# Patient Record
Sex: MALE | Race: WHITE | NOT HISPANIC OR LATINO | Employment: OTHER | URBAN - METROPOLITAN AREA
[De-identification: names, ages, dates, MRNs, and addresses within clinical notes are randomized per-mention and may not be internally consistent; named-entity substitution may affect disease eponyms.]

---

## 2017-03-08 RX ORDER — MELOXICAM 15 MG/1
TABLET ORAL
Qty: 90 TABLET | Refills: 1 | Status: SHIPPED | OUTPATIENT
Start: 2017-03-08 | End: 2017-12-11 | Stop reason: SDUPTHER

## 2017-04-12 ENCOUNTER — OFFICE VISIT (OUTPATIENT)
Dept: FAMILY MEDICINE CLINIC | Facility: CLINIC | Age: 62
End: 2017-04-12

## 2017-04-12 VITALS
OXYGEN SATURATION: 98 % | WEIGHT: 220.7 LBS | HEART RATE: 59 BPM | DIASTOLIC BLOOD PRESSURE: 70 MMHG | HEIGHT: 72 IN | SYSTOLIC BLOOD PRESSURE: 122 MMHG | TEMPERATURE: 97.5 F | BODY MASS INDEX: 29.89 KG/M2

## 2017-04-12 DIAGNOSIS — J06.9 ACUTE URI: Primary | ICD-10-CM

## 2017-04-12 PROCEDURE — 99213 OFFICE O/P EST LOW 20 MIN: CPT | Performed by: FAMILY MEDICINE

## 2017-04-12 RX ORDER — AMOXICILLIN 500 MG/1
500 TABLET, FILM COATED ORAL 3 TIMES DAILY
Qty: 21 TABLET | Refills: 0 | Status: SHIPPED | OUTPATIENT
Start: 2017-04-12 | End: 2017-04-19

## 2017-04-12 NOTE — PROGRESS NOTES
"  Chief Complaint   Patient presents with   • Nasal Congestion     x 3 days   • Cough       Upper Respiratory Infection: Patient complains of symptoms of a URI. Symptoms include congestion, cough and sore throat. Onset of symptoms was 3 days ago, gradually worsening since that time. He also c/o achiness, congestion, low grade fever, nasal congestion, post nasal drip, sinus pressure and sore throat for the past 2 days .  He is drinking plenty of fluids. Evaluation to date: none. Treatment to date: none.  Ill contacts at home or school or work discussed.  Grandson was sick last weekend      Vitals:    04/12/17 1538   BP: 122/70   Pulse: 59   Temp: 97.5 °F (36.4 °C)   SpO2: 98%   Weight: 220 lb 11.2 oz (100 kg)   Height: 72\" (182.9 cm)     Gen: mildly ill appearing, alert  Ears: Tm's bulging without redness  Nose:  Congestion  Throat:  Red without exudate, some drainage, tonsils okay  Neck: no LAD  Lung: mild rales, good air movement, regular RR  Heart: RR without murmur  Skin: no rash.      Assessment/Plan   Alfred was seen today for nasal congestion and cough.    Diagnoses and all orders for this visit:    Acute URI  -     amoxicillin (AMOXIL) 500 MG tablet; Take 1 tablet by mouth 3 (Three) Times a Day for 7 days.             Tylenol or Advil as needed for pain, fever, muscle aches  Plenty of fluids  Hand washing discussed  Off work or school note given if needed.  Warm tea for throat.  Pros and cons of antibiotic use discussed    Dr. Placido Peng MD  Family [place], [place]  [facility]  "

## 2017-06-05 DIAGNOSIS — N52.8 OTHER MALE ERECTILE DYSFUNCTION: ICD-10-CM

## 2017-06-05 RX ORDER — TADALAFIL 20 MG
TABLET ORAL
Qty: 6 TABLET | Refills: 5 | Status: SHIPPED | OUTPATIENT
Start: 2017-06-05 | End: 2018-02-14 | Stop reason: ALTCHOICE

## 2017-09-11 ENCOUNTER — OFFICE VISIT (OUTPATIENT)
Dept: FAMILY MEDICINE CLINIC | Facility: CLINIC | Age: 62
End: 2017-09-11

## 2017-09-11 VITALS
HEART RATE: 74 BPM | OXYGEN SATURATION: 98 % | DIASTOLIC BLOOD PRESSURE: 78 MMHG | HEIGHT: 72 IN | RESPIRATION RATE: 16 BRPM | SYSTOLIC BLOOD PRESSURE: 130 MMHG | BODY MASS INDEX: 29.53 KG/M2 | TEMPERATURE: 98.4 F | WEIGHT: 218 LBS

## 2017-09-11 DIAGNOSIS — J06.9 ACUTE URI: Primary | ICD-10-CM

## 2017-09-11 PROCEDURE — 99213 OFFICE O/P EST LOW 20 MIN: CPT | Performed by: PHYSICIAN ASSISTANT

## 2017-09-11 RX ORDER — BENZONATATE 200 MG/1
200 CAPSULE ORAL 3 TIMES DAILY PRN
Qty: 30 CAPSULE | Refills: 0 | Status: SHIPPED | OUTPATIENT
Start: 2017-09-11 | End: 2018-05-17

## 2017-09-11 RX ORDER — AMOXICILLIN 875 MG/1
875 TABLET, COATED ORAL 2 TIMES DAILY
Qty: 20 TABLET | Refills: 0 | Status: SHIPPED | OUTPATIENT
Start: 2017-09-11 | End: 2017-09-21

## 2017-09-11 NOTE — PROGRESS NOTES
"Subjective   Alfred Vazquez is a 61 y.o. male.   Chief Complaint   Patient presents with   • Cough   • Headache       History of Present Illness     Alfred is a 61 year old male who presents with post nasal drip,achy feeling,dry cough to slight wet sounding cough,headaches,and clear rhinorrhea for the past week.  Alfred was recently at [facility].  Denied any fevers,chills,nausea,vomiting,diarrhea,shortness of air or wheezing.  Appetite and sleep has been normal.  He tirado been taking Xyzal,Robitussin and sudafed without relief.      The following portions of the patient's history were reviewed and updated as appropriate: allergies, current medications, past family history, past medical history, past social history and past surgical history.    Review of Systems   Constitutional: Positive for fatigue. Negative for chills and fever.   HENT: Positive for congestion, postnasal drip and rhinorrhea. Negative for ear pain and sore throat.    Eyes: Negative.    Respiratory: Positive for cough. Negative for shortness of breath and wheezing.    Cardiovascular: Negative.  Negative for chest pain, palpitations and leg swelling.   Gastrointestinal: Negative.  Negative for constipation, diarrhea and nausea.   Endocrine: Negative.    Genitourinary: Negative.    Musculoskeletal: Positive for myalgias.   Skin: Negative.    Allergic/Immunologic: Negative.    Neurological: Positive for headaches. Negative for dizziness and light-headedness.   Hematological: Negative.    Psychiatric/Behavioral: Negative.    All other systems reviewed and are negative.    Vitals:    09/11/17 1444   BP: 130/78   BP Location: Left arm   Patient Position: Sitting   Cuff Size: Large Adult   Pulse: 74   Resp: 16   Temp: 98.4 °F (36.9 °C)   TempSrc: Oral   SpO2: 98%   Weight: 218 lb (98.9 kg)   Height: 72\" (182.9 cm)       Wt Readings from Last 3 Encounters:   09/11/17 218 lb (98.9 kg)   04/12/17 220 lb 11.2 oz (100 kg)   12/12/16 220 lb (99.8 kg) "       BP Readings from Last 3 Encounters:   09/11/17 130/78   04/12/17 122/70   12/12/16 108/62     Body mass index is 29.57 kg/(m^2).  Allergies   Allergen Reactions   • Codeine Nausea Only and Nausea And Vomiting       Objective   Physical Exam   Constitutional: He is oriented to person, place, and time. Vital signs are normal. He appears well-developed and well-nourished.   HENT:   Head: Normocephalic and atraumatic.   Right Ear: Hearing, external ear and ear canal normal. Tympanic membrane is bulging.   Left Ear: Hearing, external ear and ear canal normal. Tympanic membrane is bulging.   Nose: Nose normal. Right sinus exhibits no maxillary sinus tenderness and no frontal sinus tenderness. Left sinus exhibits no maxillary sinus tenderness and no frontal sinus tenderness.   Mouth/Throat: Uvula is midline and mucous membranes are normal.   2+ post nasal drip   Eyes: Conjunctivae, EOM and lids are normal.   Neck: Trachea normal and phonation normal. Neck supple. No edema present.   Cardiovascular: Normal rate, regular rhythm, S1 normal, S2 normal, normal heart sounds and normal pulses.    Pulmonary/Chest: Effort normal and breath sounds normal.   Abdominal: Soft. Normal appearance and bowel sounds are normal. There is no hepatomegaly. There is no tenderness.   Lymphadenopathy:     He has no cervical adenopathy.   Neurological: He is alert and oriented to person, place, and time.   Skin: Skin is warm, dry and intact.   Psychiatric: He has a normal mood and affect. His speech is normal and behavior is normal. Judgment and thought content normal. Cognition and memory are normal.       Assessment/Plan   Alfred was seen today for cough and headache.    Diagnoses and all orders for this visit:    Acute URI  -     benzonatate (TESSALON) 200 MG capsule; Take 1 capsule by mouth 3 (Three) Times a Day As Needed for Cough.  -     amoxicillin (AMOXIL) 875 MG tablet; Take 1 tablet by mouth 2 (Two) Times a Day for 10 days.        Alfred Vazquez was seen in office today and diagnosed with acute upper respiratory infection.  I have prescribed amoxicillin and Tessalon Perles to pharmacy.  Alfred may continue using his son's and Robitussin as needed at home.  Alfred will return to office if no improvement.        Yobani transcription disclaimer    Much of this encounter note is an electronic transcription/translation of spoken language to printed text.  The electronic translation of spoken language may permit erroneous, or at times, nonsensical words or phrases to be inadvertently transcribed.  Although I have reviewed the note for such errors, some may still exist.    Eboni Graves PA-C  Family Practice

## 2017-09-21 ENCOUNTER — CLINICAL SUPPORT (OUTPATIENT)
Dept: FAMILY MEDICINE CLINIC | Facility: CLINIC | Age: 62
End: 2017-09-21

## 2017-09-21 DIAGNOSIS — Z23 IMMUNIZATION DUE: Primary | ICD-10-CM

## 2017-09-21 PROCEDURE — 90471 IMMUNIZATION ADMIN: CPT | Performed by: FAMILY MEDICINE

## 2017-09-21 PROCEDURE — 90686 IIV4 VACC NO PRSV 0.5 ML IM: CPT | Performed by: FAMILY MEDICINE

## 2017-10-24 DIAGNOSIS — Z00.00 HEALTH CARE MAINTENANCE: ICD-10-CM

## 2017-10-24 DIAGNOSIS — Z00.00 ANNUAL PHYSICAL EXAM: Primary | ICD-10-CM

## 2017-10-25 LAB
25(OH)D3+25(OH)D2 SERPL-MCNC: 36.6 NG/ML
ALBUMIN SERPL-MCNC: 4.2 G/DL (ref 3.5–5.2)
ALBUMIN/GLOB SERPL: 1.9 G/DL
ALP SERPL-CCNC: 55 U/L (ref 40–129)
ALT SERPL-CCNC: 21 U/L (ref 5–41)
AST SERPL-CCNC: 23 U/L (ref 5–40)
BASOPHILS # BLD AUTO: 0.04 10*3/MM3 (ref 0–0.2)
BASOPHILS NFR BLD AUTO: 0.9 % (ref 0–2)
BILIRUB SERPL-MCNC: 0.7 MG/DL (ref 0.2–1.2)
BUN SERPL-MCNC: 29 MG/DL (ref 8–23)
BUN/CREAT SERPL: 31.2 (ref 7–25)
CALCIUM SERPL-MCNC: 9.2 MG/DL (ref 8.8–10.5)
CHLORIDE SERPL-SCNC: 105 MMOL/L (ref 98–107)
CHOLEST SERPL-MCNC: 202 MG/DL (ref 0–200)
CO2 SERPL-SCNC: 27.8 MMOL/L (ref 22–29)
CREAT SERPL-MCNC: 0.93 MG/DL (ref 0.76–1.27)
EOSINOPHIL # BLD AUTO: 0.44 10*3/MM3 (ref 0.1–0.3)
EOSINOPHIL NFR BLD AUTO: 10.4 % (ref 0–4)
ERYTHROCYTE [DISTWIDTH] IN BLOOD BY AUTOMATED COUNT: 13.2 % (ref 11.5–14.5)
GFR SERPLBLD CREATININE-BSD FMLA CKD-EPI: 82 ML/MIN/1.73
GFR SERPLBLD CREATININE-BSD FMLA CKD-EPI: 99 ML/MIN/1.73
GLOBULIN SER CALC-MCNC: 2.2 GM/DL
GLUCOSE SERPL-MCNC: 110 MG/DL (ref 65–99)
HCT VFR BLD AUTO: 45.7 % (ref 42–52)
HDLC SERPL-MCNC: 67 MG/DL (ref 40–60)
HGB BLD-MCNC: 15.4 G/DL (ref 14–18)
IMM GRANULOCYTES # BLD: 0.01 10*3/MM3 (ref 0–0.03)
IMM GRANULOCYTES NFR BLD: 0.2 % (ref 0–0.5)
LDLC SERPL CALC-MCNC: 116 MG/DL (ref 0–100)
LYMPHOCYTES # BLD AUTO: 1.35 10*3/MM3 (ref 0.6–4.8)
LYMPHOCYTES NFR BLD AUTO: 31.8 % (ref 20–45)
MCH RBC QN AUTO: 32.5 PG (ref 27–31)
MCHC RBC AUTO-ENTMCNC: 33.7 G/DL (ref 31–37)
MCV RBC AUTO: 96.4 FL (ref 80–94)
MONOCYTES # BLD AUTO: 0.5 10*3/MM3 (ref 0–1)
MONOCYTES NFR BLD AUTO: 11.8 % (ref 3–8)
NEUTROPHILS # BLD AUTO: 1.9 10*3/MM3 (ref 1.5–8.3)
NEUTROPHILS NFR BLD AUTO: 44.9 % (ref 45–70)
NRBC BLD AUTO-RTO: 0 /100 WBC (ref 0–0)
PLATELET # BLD AUTO: 194 10*3/MM3 (ref 140–500)
POTASSIUM SERPL-SCNC: 4.8 MMOL/L (ref 3.5–5.2)
PROT SERPL-MCNC: 6.4 G/DL (ref 6–8.5)
PSA SERPL-MCNC: 0.31 NG/ML (ref 0–4)
RBC # BLD AUTO: 4.74 10*6/MM3 (ref 4.7–6.1)
SODIUM SERPL-SCNC: 144 MMOL/L (ref 136–145)
TRIGL SERPL-MCNC: 94 MG/DL (ref 0–150)
VLDLC SERPL CALC-MCNC: 18.8 MG/DL (ref 8–32)
WBC # BLD AUTO: 4.24 10*3/MM3 (ref 4.8–10.8)

## 2017-11-02 ENCOUNTER — OFFICE VISIT (OUTPATIENT)
Dept: FAMILY MEDICINE CLINIC | Facility: CLINIC | Age: 62
End: 2017-11-02

## 2017-11-02 VITALS
BODY MASS INDEX: 29.66 KG/M2 | TEMPERATURE: 98.3 F | DIASTOLIC BLOOD PRESSURE: 78 MMHG | WEIGHT: 219 LBS | SYSTOLIC BLOOD PRESSURE: 118 MMHG | HEART RATE: 62 BPM | OXYGEN SATURATION: 98 % | HEIGHT: 72 IN

## 2017-11-02 DIAGNOSIS — Z00.00 HEALTHCARE MAINTENANCE: Primary | ICD-10-CM

## 2017-11-02 DIAGNOSIS — Z00.00 ANNUAL PHYSICAL EXAM: ICD-10-CM

## 2017-11-02 DIAGNOSIS — Z23 NEED FOR VACCINATION: ICD-10-CM

## 2017-11-02 DIAGNOSIS — M15.9 PRIMARY OSTEOARTHRITIS INVOLVING MULTIPLE JOINTS: ICD-10-CM

## 2017-11-02 DIAGNOSIS — K21.9 GASTROESOPHAGEAL REFLUX DISEASE WITHOUT ESOPHAGITIS: ICD-10-CM

## 2017-11-02 DIAGNOSIS — E78.2 MIXED HYPERLIPIDEMIA: ICD-10-CM

## 2017-11-02 DIAGNOSIS — I48.91 ATRIAL FIBRILLATION WITH RAPID VENTRICULAR RESPONSE (HCC): ICD-10-CM

## 2017-11-02 DIAGNOSIS — N40.0 BENIGN NODULAR PROSTATIC HYPERPLASIA WITHOUT LOWER URINARY TRACT SYMPTOMS: ICD-10-CM

## 2017-11-02 DIAGNOSIS — R73.02 GLUCOSE INTOLERANCE (IMPAIRED GLUCOSE TOLERANCE): ICD-10-CM

## 2017-11-02 DIAGNOSIS — J30.1 CHRONIC SEASONAL ALLERGIC RHINITIS DUE TO POLLEN: ICD-10-CM

## 2017-11-02 DIAGNOSIS — N52.9 ERECTILE DYSFUNCTION, UNSPECIFIED ERECTILE DYSFUNCTION TYPE: ICD-10-CM

## 2017-11-02 LAB
BILIRUB BLD-MCNC: NEGATIVE MG/DL
CLARITY, POC: CLEAR
COLOR UR: YELLOW
DEVELOPER EXPIRATION DATE: NORMAL
DEVELOPER LOT NUMBER: NORMAL
EXPIRATION DATE: NORMAL
FECAL OCCULT BLOOD SCREEN, POC: NEGATIVE
GLUCOSE UR STRIP-MCNC: NEGATIVE MG/DL
KETONES UR QL: NEGATIVE
LEUKOCYTE EST, POC: NEGATIVE
Lab: NORMAL
NEGATIVE CONTROL: NEGATIVE
NITRITE UR-MCNC: NEGATIVE MG/ML
PH UR: 5 [PH] (ref 5–8)
POSITIVE CONTROL: POSITIVE
PROT UR STRIP-MCNC: NEGATIVE MG/DL
RBC # UR STRIP: NEGATIVE /UL
SP GR UR: 1.02 (ref 1–1.03)
UROBILINOGEN UR QL: NORMAL

## 2017-11-02 PROCEDURE — 99396 PREV VISIT EST AGE 40-64: CPT | Performed by: FAMILY MEDICINE

## 2017-11-02 PROCEDURE — 90471 IMMUNIZATION ADMIN: CPT | Performed by: FAMILY MEDICINE

## 2017-11-02 PROCEDURE — 82274 ASSAY TEST FOR BLOOD FECAL: CPT | Performed by: FAMILY MEDICINE

## 2017-11-02 PROCEDURE — 90732 PPSV23 VACC 2 YRS+ SUBQ/IM: CPT | Performed by: FAMILY MEDICINE

## 2017-11-02 PROCEDURE — 81002 URINALYSIS NONAUTO W/O SCOPE: CPT | Performed by: FAMILY MEDICINE

## 2017-11-02 RX ORDER — FLECAINIDE ACETATE 100 MG/1
100 TABLET ORAL 2 TIMES DAILY
COMMUNITY

## 2017-11-02 RX ORDER — FLECAINIDE ACETATE 100 MG/1
100 TABLET ORAL
COMMUNITY
Start: 2017-09-22 | End: 2017-11-02

## 2017-11-02 NOTE — PROGRESS NOTES
Subjective   Alfred Vazquez is a 62 y.o. male with   Chief Complaint   Patient presents with   • Annual Exam   .    History of Present Illness     Patient presents today for an annual exam.  Patient has unstable HLD.  He tolerates current medications well.  He recently had 3 episodes of AFIB.With his last episode of atrial fibrillation his flecainide was increased in dose.  Patient sees Dr. Kenyon of Cardiology for this.    Patient continues to walk while playing golf instead of using a cart.   Patient states that colonoscopy is up to date. Tetanus vaccine is up to date.     The following portions of the patient's history were reviewed and updated as appropriate: allergies, current medications, past family history, past medical history, past social history, past surgical history and problem list.    Review of Systems   Cardiovascular: Positive for palpitations. Negative for chest pain and leg swelling.        HLD  AFIB   Gastrointestinal:        GERD   Endocrine: Negative for cold intolerance and heat intolerance.        Glucose Intolerance   Genitourinary:        ED   Musculoskeletal: Positive for arthralgias.   Allergic/Immunologic: Positive for environmental allergies.   All other systems reviewed and are negative.      Objective     Vitals:    11/02/17 0838   BP: 118/78   Pulse: 62   Temp: 98.3 °F (36.8 °C)   SpO2: 98%     BP Readings from Last 3 Encounters:   11/02/17 118/78   09/11/17 130/78   04/12/17 122/70      Wt Readings from Last 3 Encounters:   11/02/17 219 lb (99.3 kg)   09/11/17 218 lb (98.9 kg)   04/12/17 220 lb 11.2 oz (100 kg)      Office Visit on 11/02/2017   Component Date Value Ref Range Status   • Color 11/02/2017 Yellow  Yellow, Straw, Dark Yellow, Nellie Final   • Clarity, UA 11/02/2017 Clear  Clear Final   • Glucose, UA 11/02/2017 Negative  Negative, 1000 mg/dL (3+) mg/dL Final   • Bilirubin 11/02/2017 Negative  Negative Final   • Ketones, UA 11/02/2017 Negative  Negative Final   • Specific  Gravity  11/02/2017 1.025  1.005 - 1.030 Final   • Blood, UA 11/02/2017 Negative  Negative Final   • pH, Urine 11/02/2017 5.0  5.0 - 8.0 Final   • Protein, POC 11/02/2017 Negative  Negative mg/dL Final   • Urobilinogen, UA 11/02/2017 Normal  Normal Final   • Leukocytes 11/02/2017 Negative  Negative Final   • Nitrite, UA 11/02/2017 Negative  Negative Final   • Fecal Occult Blood 11/02/2017 Negative  Negative Final   • Lot Number 11/02/2017 [id number]   Final   • Expiration Date 11/02/2017 8/31/18   Final   • DEVELOPER LOT NUMBER 11/02/2017 [id number]   Final   • DEVELOPER EXPIRATION DATE 11/02/2017 7/31/18   Final   • Positive Control 11/02/2017 Positive  Positive Final   • Negative Control 11/02/2017 Negative  Negative Final   Orders Only on 10/24/2017   Component Date Value Ref Range Status   • WBC 10/25/2017 4.24* 4.80 - 10.80 10*3/mm3 Final   • RBC 10/25/2017 4.74  4.70 - 6.10 10*6/mm3 Final   • Hemoglobin 10/25/2017 15.4  14.0 - 18.0 g/dL Final   • Hematocrit 10/25/2017 45.7  42.0 - 52.0 % Final   • MCV 10/25/2017 96.4* 80.0 - 94.0 fL Final   • MCH 10/25/2017 32.5* 27.0 - 31.0 pg Final   • MCHC 10/25/2017 33.7  31.0 - 37.0 g/dL Final   • RDW 10/25/2017 13.2  11.5 - 14.5 % Final   • Platelets 10/25/2017 194  140 - 500 10*3/mm3 Final   • Neutrophil Rel % 10/25/2017 44.9* 45.0 - 70.0 % Final   • Lymphocyte Rel % 10/25/2017 31.8  20.0 - 45.0 % Final   • Monocyte Rel % 10/25/2017 11.8* 3.0 - 8.0 % Final   • Eosinophil Rel % 10/25/2017 10.4* 0.0 - 4.0 % Final   • Basophil Rel % 10/25/2017 0.9  0.0 - 2.0 % Final   • Neutrophils Absolute 10/25/2017 1.90  1.50 - 8.30 10*3/mm3 Final   • Lymphocytes Absolute 10/25/2017 1.35  0.60 - 4.80 10*3/mm3 Final   • Monocytes Absolute 10/25/2017 0.50  0.00 - 1.00 10*3/mm3 Final   • Eosinophils Absolute 10/25/2017 0.44* 0.10 - 0.30 10*3/mm3 Final   • Basophils Absolute 10/25/2017 0.04  0.00 - 0.20 10*3/mm3 Final   • Immature Granulocyte Rel % 10/25/2017 0.2  0.0 - 0.5 % Final   • Immature  Grans Absolute 10/25/2017 0.01  0.00 - 0.03 10*3/mm3 Final   • nRBC 10/25/2017 0.0  0.0 - 0.0 /100 WBC Final   • Glucose 10/25/2017 110* 65 - 99 mg/dL Final   • BUN 10/25/2017 29* 8 - 23 mg/dL Final   • Creatinine 10/25/2017 0.93  0.76 - 1.27 mg/dL Final   • eGFR Non  Am 10/25/2017 82  >60 mL/min/1.73 Final   • eGFR African Am 10/25/2017 99  >60 mL/min/1.73 Final   • BUN/Creatinine Ratio 10/25/2017 31.2* 7.0 - 25.0 Final   • Sodium 10/25/2017 144  136 - 145 mmol/L Final   • Potassium 10/25/2017 4.8  3.5 - 5.2 mmol/L Final   • Chloride 10/25/2017 105  98 - 107 mmol/L Final   • Total CO2 10/25/2017 27.8  22.0 - 29.0 mmol/L Final   • Calcium 10/25/2017 9.2  8.8 - 10.5 mg/dL Final   • Total Protein 10/25/2017 6.4  6.0 - 8.5 g/dL Final   • Albumin 10/25/2017 4.20  3.50 - 5.20 g/dL Final   • Globulin 10/25/2017 2.2  gm/dL Final   • A/G Ratio 10/25/2017 1.9  g/dL Final   • Total Bilirubin 10/25/2017 0.7  0.2 - 1.2 mg/dL Final   • Alkaline Phosphatase 10/25/2017 55  40 - 129 U/L Final   • AST (SGOT) 10/25/2017 23  5 - 40 U/L Final   • ALT (SGPT) 10/25/2017 21  5 - 41 U/L Final   • Total Cholesterol 10/25/2017 202* 0 - 200 mg/dL Final   • Triglycerides 10/25/2017 94  0 - 150 mg/dL Final   • HDL Cholesterol 10/25/2017 67* 40 - 60 mg/dL Final   • VLDL Cholesterol 10/25/2017 18.8  8 - 32 mg/dL Final   • LDL Cholesterol  10/25/2017 116* 0 - 100 mg/dL Final   • PSA 10/25/2017 0.307  0.000 - 4.000 ng/mL Final    Comment: The total PSA (tPSA) method performed at [facility] is a  quantitative electrochemiluminescence immunoassay 'ECLIA'     • 25 Hydroxy, Vitamin D 10/25/2017 36.6  ng/mL Final    Comment: Reference Range for Total Vitamin D 25(OH)  Deficiency    <20.0 ng/mL  Insufficiency 21-29 ng/mL  Sufficiency   30-74 ng/mL         Physical Exam   Constitutional: He is oriented to person, place, and time. Vital signs are normal. He appears well-developed and well-nourished. No distress.   HENT:   Head: Normocephalic and  atraumatic.   Right Ear: Hearing, tympanic membrane, external ear and ear canal normal.   Left Ear: Hearing, tympanic membrane, external ear and ear canal normal.   Nose: Nose normal.   Mouth/Throat: Uvula is midline, oropharynx is clear and moist and mucous membranes are normal.   Eyes: Conjunctivae, EOM and lids are normal. Pupils are equal, round, and reactive to light. No scleral icterus.   Fundoscopic exam:       The right eye shows no AV nicking, no exudate, no hemorrhage and no papilledema.        The left eye shows no AV nicking, no exudate, no hemorrhage and no papilledema.   Neck: Trachea normal and normal range of motion. Neck supple. No JVD present. No muscular tenderness present. Carotid bruit is not present. Normal range of motion present. No thyroid mass and no thyromegaly present.   Cardiovascular: Normal rate, regular rhythm, S1 normal, S2 normal, normal heart sounds, intact distal pulses and normal pulses.  PMI is not displaced.  Exam reveals no gallop and no friction rub.    No murmur heard.  Pulses:       Carotid pulses are 2+ on the right side, and 2+ on the left side.       Radial pulses are 2+ on the right side, and 2+ on the left side.   Pulmonary/Chest: Effort normal and breath sounds normal. He has no decreased breath sounds. He has no wheezes. He has no rhonchi. He has no rales.   Abdominal: Soft. Bowel sounds are normal. He exhibits no distension and no mass. There is no hepatosplenomegaly. There is no tenderness. There is no rigidity, no rebound, no guarding and no CVA tenderness. No hernia. Hernia confirmed negative in the right inguinal area and confirmed negative in the left inguinal area.   Genitourinary: Rectum normal, testes normal and penis normal. Rectal exam shows no external hemorrhoid, no fissure, no mass, no tenderness, anal tone normal and guaiac negative stool. Prostate is enlarged (borderline prostate enlargement.  Lobes are symmetrical with normal consistency.  No  evidence of nodule, mass, or hard region.). Prostate is not tender. Cremasteric reflex is present. Right testis shows no mass, no swelling and no tenderness. Right testis is descended. Left testis shows no mass, no swelling and no tenderness. Left testis is descended. Circumcised. No phimosis, paraphimosis, hypospadias, penile erythema or penile tenderness. No discharge found.   Musculoskeletal: Normal range of motion. He exhibits no edema, tenderness or deformity.   Lymphadenopathy:     He has no cervical adenopathy.   Neurological: He is alert and oriented to person, place, and time. He has normal strength and normal reflexes. He displays no tremor and normal reflexes. No cranial nerve deficit or sensory deficit. He exhibits normal muscle tone. He displays a negative Romberg sign. Coordination and gait normal.   Reflex Scores:       Bicep reflexes are 2+ on the right side and 2+ on the left side.       Brachioradialis reflexes are 2+ on the right side and 2+ on the left side.       Patellar reflexes are 2+ on the right side and 2+ on the left side.  Skin: Skin is warm and dry. No rash noted.   Psychiatric: He has a normal mood and affect. His speech is normal and behavior is normal. Judgment and thought content normal. Cognition and memory are normal.   Nursing note and vitals reviewed.      Assessment/Plan   Alfred was seen today for annual exam.    Diagnoses and all orders for this visit:    Healthcare maintenance  -     POCT urinalysis dipstick, manual  -     POC Occult Blood Stool    Annual physical exam    Mixed hyperlipidemia  -     CBC & Differential; Future  -     Comprehensive Metabolic Panel; Future  -     Lipid Panel; Future    Glucose intolerance (impaired glucose tolerance)  -     CBC & Differential; Future  -     Comprehensive Metabolic Panel; Future  -     Hemoglobin A1c; Future    Need for vaccination  -     Pneumococcal Polysaccharide Vaccine 23-Valent Greater Than or Equal To 1yo Subcutaneous /  IM    Atrial fibrillation with rapid ventricular response    Chronic seasonal allergic rhinitis due to pollen    Gastroesophageal reflux disease without esophagitis    Primary osteoarthritis involving multiple joints    Erectile dysfunction, unspecified erectile dysfunction type    Benign nodular prostatic hyperplasia without lower urinary tract symptoms    Patient has been instructed to maintain a low-cholesterol diet with an LDL goal of 100 or less.  He must also maintain ideal body weight to keep sugar status and control.  Fasting laboratory studies will be repeated in 6 months with follow-up with me thereafter.    Return in about 6 months (around 5/2/2018) for Recheck.    Scribed for Julio Watson MD by Augustine Palmer. 11/02/2017    I, Julio Watson MD personally performed the services described in this documentation, as scribed by Augustine Palmer in my presence, and it is both accurate and complete

## 2017-12-11 RX ORDER — MELOXICAM 15 MG/1
TABLET ORAL
Qty: 90 TABLET | Refills: 0 | Status: SHIPPED | OUTPATIENT
Start: 2017-12-11 | End: 2018-03-17 | Stop reason: SDUPTHER

## 2018-02-14 ENCOUNTER — TELEPHONE (OUTPATIENT)
Dept: FAMILY MEDICINE CLINIC | Facility: CLINIC | Age: 63
End: 2018-02-14

## 2018-02-14 RX ORDER — SILDENAFIL CITRATE 20 MG/1
TABLET ORAL
Qty: 30 TABLET | Refills: 3 | Status: SHIPPED | OUTPATIENT
Start: 2018-02-14 | End: 2018-02-20 | Stop reason: SDUPTHER

## 2018-02-14 NOTE — TELEPHONE ENCOUNTER
Pt is calling to see if you will change is cialis to sildenafil as you had at one time discussed.  Please advise.  
done  
home

## 2018-02-20 RX ORDER — SILDENAFIL CITRATE 20 MG/1
TABLET ORAL
Qty: 90 TABLET | Refills: 1 | Status: SHIPPED | OUTPATIENT
Start: 2018-02-20 | End: 2018-11-08 | Stop reason: SDUPTHER

## 2018-03-19 RX ORDER — MELOXICAM 15 MG/1
TABLET ORAL
Qty: 90 TABLET | Refills: 0 | Status: SHIPPED | OUTPATIENT
Start: 2018-03-19 | End: 2018-06-12 | Stop reason: SDUPTHER

## 2018-05-02 ENCOUNTER — RESULTS ENCOUNTER (OUTPATIENT)
Dept: FAMILY MEDICINE CLINIC | Facility: CLINIC | Age: 63
End: 2018-05-02

## 2018-05-02 DIAGNOSIS — E78.2 MIXED HYPERLIPIDEMIA: ICD-10-CM

## 2018-05-02 DIAGNOSIS — R73.02 GLUCOSE INTOLERANCE (IMPAIRED GLUCOSE TOLERANCE): ICD-10-CM

## 2018-05-03 LAB
ALBUMIN SERPL-MCNC: 4.1 G/DL (ref 3.5–5.2)
ALBUMIN/GLOB SERPL: 1.8 G/DL
ALP SERPL-CCNC: 54 U/L (ref 40–129)
ALT SERPL-CCNC: 22 U/L (ref 5–41)
AST SERPL-CCNC: 25 U/L (ref 5–40)
BASOPHILS # BLD AUTO: 0.02 10*3/MM3 (ref 0–0.2)
BASOPHILS NFR BLD AUTO: 0.4 % (ref 0–2)
BILIRUB SERPL-MCNC: 0.6 MG/DL (ref 0.2–1.2)
BUN SERPL-MCNC: 28 MG/DL (ref 8–23)
BUN/CREAT SERPL: 31.1 (ref 7–25)
CALCIUM SERPL-MCNC: 8.9 MG/DL (ref 8.8–10.5)
CHLORIDE SERPL-SCNC: 104 MMOL/L (ref 98–107)
CHOLEST SERPL-MCNC: 170 MG/DL (ref 0–200)
CO2 SERPL-SCNC: 27.9 MMOL/L (ref 22–29)
CREAT SERPL-MCNC: 0.9 MG/DL (ref 0.76–1.27)
EOSINOPHIL # BLD AUTO: 0.11 10*3/MM3 (ref 0.1–0.3)
EOSINOPHIL NFR BLD AUTO: 2 % (ref 0–4)
ERYTHROCYTE [DISTWIDTH] IN BLOOD BY AUTOMATED COUNT: 12.4 % (ref 11.5–14.5)
GFR SERPLBLD CREATININE-BSD FMLA CKD-EPI: 103 ML/MIN/1.73
GFR SERPLBLD CREATININE-BSD FMLA CKD-EPI: 85 ML/MIN/1.73
GLOBULIN SER CALC-MCNC: 2.3 GM/DL
GLUCOSE SERPL-MCNC: 106 MG/DL (ref 65–99)
HBA1C MFR BLD: 5.7 % (ref 4.8–5.6)
HCT VFR BLD AUTO: 44.8 % (ref 42–52)
HDLC SERPL-MCNC: 66 MG/DL (ref 40–60)
HGB BLD-MCNC: 15 G/DL (ref 14–18)
IMM GRANULOCYTES # BLD: 0.02 10*3/MM3 (ref 0–0.03)
IMM GRANULOCYTES NFR BLD: 0.4 % (ref 0–0.5)
LDLC SERPL CALC-MCNC: 88 MG/DL (ref 0–100)
LYMPHOCYTES # BLD AUTO: 1.24 10*3/MM3 (ref 0.6–4.8)
LYMPHOCYTES NFR BLD AUTO: 22.8 % (ref 20–45)
MCH RBC QN AUTO: 32.6 PG (ref 27–31)
MCHC RBC AUTO-ENTMCNC: 33.5 G/DL (ref 31–37)
MCV RBC AUTO: 97.4 FL (ref 80–94)
MONOCYTES # BLD AUTO: 0.68 10*3/MM3 (ref 0–1)
MONOCYTES NFR BLD AUTO: 12.5 % (ref 3–8)
NEUTROPHILS # BLD AUTO: 3.37 10*3/MM3 (ref 1.5–8.3)
NEUTROPHILS NFR BLD AUTO: 61.9 % (ref 45–70)
NRBC BLD AUTO-RTO: 0 /100 WBC (ref 0–0)
PLATELET # BLD AUTO: 196 10*3/MM3 (ref 140–500)
POTASSIUM SERPL-SCNC: 4.5 MMOL/L (ref 3.5–5.2)
PROT SERPL-MCNC: 6.4 G/DL (ref 6–8.5)
RBC # BLD AUTO: 4.6 10*6/MM3 (ref 4.7–6.1)
SODIUM SERPL-SCNC: 141 MMOL/L (ref 136–145)
TRIGL SERPL-MCNC: 81 MG/DL (ref 0–150)
VLDLC SERPL CALC-MCNC: 16.2 MG/DL (ref 8–32)
WBC # BLD AUTO: 5.44 10*3/MM3 (ref 4.8–10.8)

## 2018-05-17 ENCOUNTER — OFFICE VISIT (OUTPATIENT)
Dept: FAMILY MEDICINE CLINIC | Facility: CLINIC | Age: 63
End: 2018-05-17

## 2018-05-17 VITALS
SYSTOLIC BLOOD PRESSURE: 100 MMHG | WEIGHT: 217 LBS | BODY MASS INDEX: 29.39 KG/M2 | TEMPERATURE: 98 F | HEIGHT: 72 IN | OXYGEN SATURATION: 97 % | DIASTOLIC BLOOD PRESSURE: 74 MMHG | RESPIRATION RATE: 16 BRPM | HEART RATE: 59 BPM

## 2018-05-17 DIAGNOSIS — M77.8 TENDINITIS OF RIGHT TRICEPS: ICD-10-CM

## 2018-05-17 DIAGNOSIS — N40.0 BENIGN NODULAR PROSTATIC HYPERPLASIA WITHOUT LOWER URINARY TRACT SYMPTOMS: ICD-10-CM

## 2018-05-17 DIAGNOSIS — R73.02 GLUCOSE INTOLERANCE (IMPAIRED GLUCOSE TOLERANCE): ICD-10-CM

## 2018-05-17 DIAGNOSIS — E55.9 VITAMIN D DEFICIENCY: ICD-10-CM

## 2018-05-17 DIAGNOSIS — E78.2 MIXED HYPERLIPIDEMIA: Primary | ICD-10-CM

## 2018-05-17 PROCEDURE — 99214 OFFICE O/P EST MOD 30 MIN: CPT | Performed by: FAMILY MEDICINE

## 2018-05-17 RX ORDER — METOPROLOL SUCCINATE 25 MG/1
25 TABLET, EXTENDED RELEASE ORAL DAILY
Qty: 90 TABLET | Refills: 1 | Status: SHIPPED | OUTPATIENT
Start: 2018-05-17 | End: 2018-11-08 | Stop reason: SDUPTHER

## 2018-05-17 RX ORDER — FAMOTIDINE 10 MG
20 TABLET ORAL DAILY
COMMUNITY

## 2018-05-17 NOTE — PATIENT INSTRUCTIONS
Results for orders placed or performed in visit on 05/02/18   Comprehensive Metabolic Panel   Result Value Ref Range    Glucose 106 (H) 65 - 99 mg/dL    BUN 28 (H) 8 - 23 mg/dL    Creatinine 0.90 0.76 - 1.27 mg/dL    eGFR Non African Am 85 >60 mL/min/1.73    eGFR African Am 103 >60 mL/min/1.73    BUN/Creatinine Ratio 31.1 (H) 7.0 - 25.0    Sodium 141 136 - 145 mmol/L    Potassium 4.5 3.5 - 5.2 mmol/L    Chloride 104 98 - 107 mmol/L    Total CO2 27.9 22.0 - 29.0 mmol/L    Calcium 8.9 8.8 - 10.5 mg/dL    Total Protein 6.4 6.0 - 8.5 g/dL    Albumin 4.10 3.50 - 5.20 g/dL    Globulin 2.3 gm/dL    A/G Ratio 1.8 g/dL    Total Bilirubin 0.6 0.2 - 1.2 mg/dL    Alkaline Phosphatase 54 40 - 129 U/L    AST (SGOT) 25 5 - 40 U/L    ALT (SGPT) 22 5 - 41 U/L   Hemoglobin A1c   Result Value Ref Range    Hemoglobin A1C 5.70 (H) 4.80 - 5.60 %   Lipid Panel   Result Value Ref Range    Total Cholesterol 170 0 - 200 mg/dL    Triglycerides 81 0 - 150 mg/dL    HDL Cholesterol 66 (H) 40 - 60 mg/dL    VLDL Cholesterol 16.2 8 - 32 mg/dL    LDL Cholesterol  88 0 - 100 mg/dL   CBC & Differential   Result Value Ref Range    WBC 5.44 4.80 - 10.80 10*3/mm3    RBC 4.60 (L) 4.70 - 6.10 10*6/mm3    Hemoglobin 15.0 14.0 - 18.0 g/dL    Hematocrit 44.8 42.0 - 52.0 %    MCV 97.4 (H) 80.0 - 94.0 fL    MCH 32.6 (H) 27.0 - 31.0 pg    MCHC 33.5 31.0 - 37.0 g/dL    RDW 12.4 11.5 - 14.5 %    Platelets 196 140 - 500 10*3/mm3    Neutrophil Rel % 61.9 45.0 - 70.0 %    Lymphocyte Rel % 22.8 20.0 - 45.0 %    Monocyte Rel % 12.5 (H) 3.0 - 8.0 %    Eosinophil Rel % 2.0 0.0 - 4.0 %    Basophil Rel % 0.4 0.0 - 2.0 %    Neutrophils Absolute 3.37 1.50 - 8.30 10*3/mm3    Lymphocytes Absolute 1.24 0.60 - 4.80 10*3/mm3    Monocytes Absolute 0.68 0.00 - 1.00 10*3/mm3    Eosinophils Absolute 0.11 0.10 - 0.30 10*3/mm3    Basophils Absolute 0.02 0.00 - 0.20 10*3/mm3    Immature Granulocyte Rel % 0.4 0.0 - 0.5 %    Immature Grans Absolute 0.02 0.00 - 0.03 10*3/mm3    nRBC 0.0  0.0 - 0.0 /100 WBC   Have discussed mechanism of tricep function with patient to avoid overuse of same.  He will ice this area after golf and other activities where tricep comes into play.  Continued weight loss will benefit including sugar status.  Anticipate follow-up in 6 months preceded by fasting laboratory studies unless otherwise indicated.

## 2018-05-17 NOTE — PROGRESS NOTES
Subjective   Alfred Vazquez is a 62 y.o. male with   Chief Complaint   Patient presents with   • Follow-up     on labs   .    History of Present Illness     Pt is a 61 yo white male that presents today to follow up on unstable HLD and impaired glucose tolerance.  Pt with stable exogenous obesity and stable blood pressure.  Pt states he is trying to eat healthy and lose weight which is helping to control his cholesterol..  Pt is currently stable on Metoprolol 25mg daily for his blood pressure and he tolerates the medication well.  Pt with known history of A.Fib which he is currently stable taking Flecainide.  Dr Kenyon is his cardiologist and he follows the medication use.  Pt does have a problem with some swelling/pain of the right elbow.  He states that it doesn't cause him much pain just discomfort, he can even play golf with no pain.Patient denies knowledge of trauma, injury or initiating event.    The following portions of the patient's history were reviewed and updated as appropriate: allergies, current medications, past family history, past medical history, past social history, past surgical history and problem list.    Review of Systems   Cardiovascular: Negative for chest pain, palpitations and leg swelling.        HLD   Endocrine: Negative for cold intolerance and heat intolerance.        Impaired fasting glucose   Musculoskeletal: Positive for joint swelling.        Right elbow swelling and pain   All other systems reviewed and are negative.      Objective     Vitals:    05/17/18 1055   BP: 100/74   Pulse: 59   Resp: 16   Temp: 98 °F (36.7 °C)   SpO2: 97%     BP Readings from Last 3 Encounters:   05/17/18 100/74   11/02/17 118/78   09/11/17 130/78      Wt Readings from Last 3 Encounters:   05/17/18 98.4 kg (217 lb)   11/02/17 99.3 kg (219 lb)   09/11/17 98.9 kg (218 lb)      No visits with results within 2 Week(s) from this visit.   Latest known visit with results is:   Results Encounter on 05/02/2018    Component Date Value Ref Range Status   • WBC 05/03/2018 5.44  4.80 - 10.80 10*3/mm3 Final   • RBC 05/03/2018 4.60* 4.70 - 6.10 10*6/mm3 Final   • Hemoglobin 05/03/2018 15.0  14.0 - 18.0 g/dL Final   • Hematocrit 05/03/2018 44.8  42.0 - 52.0 % Final   • MCV 05/03/2018 97.4* 80.0 - 94.0 fL Final   • MCH 05/03/2018 32.6* 27.0 - 31.0 pg Final   • MCHC 05/03/2018 33.5  31.0 - 37.0 g/dL Final   • RDW 05/03/2018 12.4  11.5 - 14.5 % Final   • Platelets 05/03/2018 196  140 - 500 10*3/mm3 Final   • Neutrophil Rel % 05/03/2018 61.9  45.0 - 70.0 % Final   • Lymphocyte Rel % 05/03/2018 22.8  20.0 - 45.0 % Final   • Monocyte Rel % 05/03/2018 12.5* 3.0 - 8.0 % Final   • Eosinophil Rel % 05/03/2018 2.0  0.0 - 4.0 % Final   • Basophil Rel % 05/03/2018 0.4  0.0 - 2.0 % Final   • Neutrophils Absolute 05/03/2018 3.37  1.50 - 8.30 10*3/mm3 Final   • Lymphocytes Absolute 05/03/2018 1.24  0.60 - 4.80 10*3/mm3 Final   • Monocytes Absolute 05/03/2018 0.68  0.00 - 1.00 10*3/mm3 Final   • Eosinophils Absolute 05/03/2018 0.11  0.10 - 0.30 10*3/mm3 Final   • Basophils Absolute 05/03/2018 0.02  0.00 - 0.20 10*3/mm3 Final   • Immature Granulocyte Rel % 05/03/2018 0.4  0.0 - 0.5 % Final   • Immature Grans Absolute 05/03/2018 0.02  0.00 - 0.03 10*3/mm3 Final   • nRBC 05/03/2018 0.0  0.0 - 0.0 /100 WBC Final   • Glucose 05/03/2018 106* 65 - 99 mg/dL Final   • BUN 05/03/2018 28* 8 - 23 mg/dL Final   • Creatinine 05/03/2018 0.90  0.76 - 1.27 mg/dL Final   • eGFR Non  Am 05/03/2018 85  >60 mL/min/1.73 Final   • eGFR African Am 05/03/2018 103  >60 mL/min/1.73 Final   • BUN/Creatinine Ratio 05/03/2018 31.1* 7.0 - 25.0 Final   • Sodium 05/03/2018 141  136 - 145 mmol/L Final   • Potassium 05/03/2018 4.5  3.5 - 5.2 mmol/L Final   • Chloride 05/03/2018 104  98 - 107 mmol/L Final   • Total CO2 05/03/2018 27.9  22.0 - 29.0 mmol/L Final   • Calcium 05/03/2018 8.9  8.8 - 10.5 mg/dL Final   • Total Protein 05/03/2018 6.4  6.0 - 8.5 g/dL Final   •  Albumin 05/03/2018 4.10  3.50 - 5.20 g/dL Final   • Globulin 05/03/2018 2.3  gm/dL Final   • A/G Ratio 05/03/2018 1.8  g/dL Final   • Total Bilirubin 05/03/2018 0.6  0.2 - 1.2 mg/dL Final   • Alkaline Phosphatase 05/03/2018 54  40 - 129 U/L Final   • AST (SGOT) 05/03/2018 25  5 - 40 U/L Final   • ALT (SGPT) 05/03/2018 22  5 - 41 U/L Final   • Hemoglobin A1C 05/03/2018 5.70* 4.80 - 5.60 % Final   • Total Cholesterol 05/03/2018 170  0 - 200 mg/dL Final   • Triglycerides 05/03/2018 81  0 - 150 mg/dL Final   • HDL Cholesterol 05/03/2018 66* 40 - 60 mg/dL Final   • VLDL Cholesterol 05/03/2018 16.2  8 - 32 mg/dL Final   • LDL Cholesterol  05/03/2018 88  0 - 100 mg/dL Final       Physical Exam   Constitutional: He is oriented to person, place, and time. He appears well-developed and well-nourished.   HENT:   Head: Normocephalic and atraumatic.   Neck: Trachea normal and phonation normal. Neck supple. Normal carotid pulses present. Carotid bruit is not present. No thyroid mass and no thyromegaly present.   Cardiovascular: Normal rate, regular rhythm and normal heart sounds.  Exam reveals no gallop and no friction rub.    No murmur heard.  Pulmonary/Chest: Effort normal and breath sounds normal. No respiratory distress. He has no decreased breath sounds. He has no wheezes. He has no rhonchi. He has no rales.   Musculoskeletal: Normal range of motion. He exhibits no deformity. Tenderness: right elbow.        Right elbow: He exhibits swelling. He exhibits normal range of motion and no deformity. Tenderness (right elbow) found.   Right elbow with full range of motion, no evidence of soft tissue swelling, and it was no evidence of deformity.  Motor is 5 over 5 in neurovascular is intact distally.  Point of maximum tenderness is in the insertion site medially of the triceps tendon.  His epicondyles of the humerus are both nontender to palpation.  He is able to do curls as well as extension exercises on a resisted basis without  discomfort.   Lymphadenopathy:     He has no cervical adenopathy.   Neurological: He is alert and oriented to person, place, and time. He has normal strength. No sensory deficit.   Skin: Skin is warm and dry. No rash noted.   Psychiatric: He has a normal mood and affect. His speech is normal and behavior is normal. Judgment and thought content normal. Cognition and memory are normal.   Nursing note and vitals reviewed.      Assessment/Plan   Alfred was seen today for follow-up.    Diagnoses and all orders for this visit:    Mixed hyperlipidemia  -     CBC & Differential; Future  -     Comprehensive Metabolic Panel; Future  -     Lipid Panel; Future    Glucose intolerance (impaired glucose tolerance)  -     Hemoglobin A1c; Future    Benign nodular prostatic hyperplasia without lower urinary tract symptoms  -     PSA DIAGNOSTIC; Future    Vitamin D deficiency  -     Vitamin D 25 Hydroxy; Future    Tendinitis of right triceps    Other orders  -     metoprolol succinate XL (TOPROL-XL) 25 MG 24 hr tablet; Take 1 tablet by mouth Daily.      Patient Instructions     Results for orders placed or performed in visit on 05/02/18   Comprehensive Metabolic Panel   Result Value Ref Range    Glucose 106 (H) 65 - 99 mg/dL    BUN 28 (H) 8 - 23 mg/dL    Creatinine 0.90 0.76 - 1.27 mg/dL    eGFR Non African Am 85 >60 mL/min/1.73    eGFR African Am 103 >60 mL/min/1.73    BUN/Creatinine Ratio 31.1 (H) 7.0 - 25.0    Sodium 141 136 - 145 mmol/L    Potassium 4.5 3.5 - 5.2 mmol/L    Chloride 104 98 - 107 mmol/L    Total CO2 27.9 22.0 - 29.0 mmol/L    Calcium 8.9 8.8 - 10.5 mg/dL    Total Protein 6.4 6.0 - 8.5 g/dL    Albumin 4.10 3.50 - 5.20 g/dL    Globulin 2.3 gm/dL    A/G Ratio 1.8 g/dL    Total Bilirubin 0.6 0.2 - 1.2 mg/dL    Alkaline Phosphatase 54 40 - 129 U/L    AST (SGOT) 25 5 - 40 U/L    ALT (SGPT) 22 5 - 41 U/L   Hemoglobin A1c   Result Value Ref Range    Hemoglobin A1C 5.70 (H) 4.80 - 5.60 %   Lipid Panel   Result Value Ref Range     Total Cholesterol 170 0 - 200 mg/dL    Triglycerides 81 0 - 150 mg/dL    HDL Cholesterol 66 (H) 40 - 60 mg/dL    VLDL Cholesterol 16.2 8 - 32 mg/dL    LDL Cholesterol  88 0 - 100 mg/dL   CBC & Differential   Result Value Ref Range    WBC 5.44 4.80 - 10.80 10*3/mm3    RBC 4.60 (L) 4.70 - 6.10 10*6/mm3    Hemoglobin 15.0 14.0 - 18.0 g/dL    Hematocrit 44.8 42.0 - 52.0 %    MCV 97.4 (H) 80.0 - 94.0 fL    MCH 32.6 (H) 27.0 - 31.0 pg    MCHC 33.5 31.0 - 37.0 g/dL    RDW 12.4 11.5 - 14.5 %    Platelets 196 140 - 500 10*3/mm3    Neutrophil Rel % 61.9 45.0 - 70.0 %    Lymphocyte Rel % 22.8 20.0 - 45.0 %    Monocyte Rel % 12.5 (H) 3.0 - 8.0 %    Eosinophil Rel % 2.0 0.0 - 4.0 %    Basophil Rel % 0.4 0.0 - 2.0 %    Neutrophils Absolute 3.37 1.50 - 8.30 10*3/mm3    Lymphocytes Absolute 1.24 0.60 - 4.80 10*3/mm3    Monocytes Absolute 0.68 0.00 - 1.00 10*3/mm3    Eosinophils Absolute 0.11 0.10 - 0.30 10*3/mm3    Basophils Absolute 0.02 0.00 - 0.20 10*3/mm3    Immature Granulocyte Rel % 0.4 0.0 - 0.5 %    Immature Grans Absolute 0.02 0.00 - 0.03 10*3/mm3    nRBC 0.0 0.0 - 0.0 /100 WBC   Have discussed mechanism of tricep function with patient to avoid overuse of same.  He will ice this area after golf and other activities where tricep comes into play.  Continued weight loss will benefit including sugar status.  Anticipate follow-up in 6 months preceded by fasting laboratory studies unless otherwise indicated.        Return in about 6 months (around 11/17/2018).    Scribed for Julio Watson MD by Ana Canales CMA. 05/17/2018    Julio WADE MD personally performed the services described in this documentation, as scribed by Ana Canales CMA in my presence, and it is both accurate and complete

## 2018-06-12 RX ORDER — MELOXICAM 15 MG/1
TABLET ORAL
Qty: 90 TABLET | Refills: 0 | Status: SHIPPED | OUTPATIENT
Start: 2018-06-12 | End: 2018-09-14 | Stop reason: SDUPTHER

## 2018-08-20 ENCOUNTER — TELEPHONE (OUTPATIENT)
Dept: FAMILY MEDICINE CLINIC | Facility: CLINIC | Age: 63
End: 2018-08-20

## 2018-08-20 NOTE — TELEPHONE ENCOUNTER
Alfred was wanting an appt with you this Monday for heal pain. But, he then went on to say do you think he should go to a podiatrist first? I can make him a appt on Monday if you think that this is best and I will let him know.

## 2018-08-21 DIAGNOSIS — M79.671 HEEL PAIN, BILATERAL: Primary | ICD-10-CM

## 2018-08-21 DIAGNOSIS — M79.672 HEEL PAIN, BILATERAL: Primary | ICD-10-CM

## 2018-08-29 RX ORDER — SILDENAFIL CITRATE 20 MG/1
TABLET ORAL
Qty: 30 TABLET | Refills: 2 | Status: SHIPPED | OUTPATIENT
Start: 2018-08-29 | End: 2019-08-08 | Stop reason: SDUPTHER

## 2018-09-14 RX ORDER — MELOXICAM 15 MG/1
TABLET ORAL
Qty: 90 TABLET | Refills: 0 | Status: SHIPPED | OUTPATIENT
Start: 2018-09-14 | End: 2018-12-28 | Stop reason: SDUPTHER

## 2018-09-18 DIAGNOSIS — R73.02 GLUCOSE INTOLERANCE (IMPAIRED GLUCOSE TOLERANCE): ICD-10-CM

## 2018-09-18 DIAGNOSIS — E55.9 VITAMIN D DEFICIENCY: ICD-10-CM

## 2018-09-18 DIAGNOSIS — E78.2 MIXED HYPERLIPIDEMIA: ICD-10-CM

## 2018-09-18 DIAGNOSIS — N40.0 BENIGN NODULAR PROSTATIC HYPERPLASIA WITHOUT LOWER URINARY TRACT SYMPTOMS: ICD-10-CM

## 2018-10-08 ENCOUNTER — FLU SHOT (OUTPATIENT)
Dept: FAMILY MEDICINE CLINIC | Facility: CLINIC | Age: 63
End: 2018-10-08

## 2018-10-08 DIAGNOSIS — Z23 FLU VACCINE NEED: ICD-10-CM

## 2018-10-08 PROCEDURE — 90674 CCIIV4 VAC NO PRSV 0.5 ML IM: CPT | Performed by: FAMILY MEDICINE

## 2018-10-08 PROCEDURE — G0008 ADMIN INFLUENZA VIRUS VAC: HCPCS | Performed by: FAMILY MEDICINE

## 2018-11-01 LAB
ALBUMIN SERPL-MCNC: 4.4 G/DL (ref 3.5–5.2)
ALBUMIN/GLOB SERPL: 2 G/DL
ALP SERPL-CCNC: 51 U/L (ref 40–129)
ALT SERPL-CCNC: 18 U/L (ref 5–41)
AST SERPL-CCNC: 20 U/L (ref 5–40)
BASOPHILS # BLD AUTO: 0.03 10*3/MM3 (ref 0–0.2)
BASOPHILS NFR BLD AUTO: 0.8 % (ref 0–2)
BILIRUB SERPL-MCNC: 0.6 MG/DL (ref 0.2–1.2)
BUN SERPL-MCNC: 36 MG/DL (ref 8–23)
BUN/CREAT SERPL: 41.9 (ref 7–25)
CALCIUM SERPL-MCNC: 9.2 MG/DL (ref 8.8–10.5)
CHLORIDE SERPL-SCNC: 105 MMOL/L (ref 98–107)
CHOLEST SERPL-MCNC: 201 MG/DL (ref 0–200)
CO2 SERPL-SCNC: 27.8 MMOL/L (ref 22–29)
CREAT SERPL-MCNC: 0.86 MG/DL (ref 0.76–1.27)
EOSINOPHIL # BLD AUTO: 0.22 10*3/MM3 (ref 0.1–0.3)
EOSINOPHIL NFR BLD AUTO: 5.6 % (ref 0–4)
ERYTHROCYTE [DISTWIDTH] IN BLOOD BY AUTOMATED COUNT: 13.1 % (ref 11.5–14.5)
GLOBULIN SER CALC-MCNC: 2.2 GM/DL
GLUCOSE SERPL-MCNC: 117 MG/DL (ref 65–99)
HBA1C MFR BLD: 6 % (ref 4.8–5.6)
HCT VFR BLD AUTO: 45.4 % (ref 42–52)
HDLC SERPL-MCNC: 74 MG/DL (ref 40–60)
HGB BLD-MCNC: 15 G/DL (ref 14–18)
IMM GRANULOCYTES # BLD: 0.02 10*3/MM3 (ref 0–0.03)
IMM GRANULOCYTES NFR BLD: 0.5 % (ref 0–0.5)
LDLC SERPL CALC-MCNC: 108 MG/DL (ref 0–100)
LYMPHOCYTES # BLD AUTO: 1.11 10*3/MM3 (ref 0.6–4.8)
LYMPHOCYTES NFR BLD AUTO: 28.4 % (ref 20–45)
MCH RBC QN AUTO: 32.5 PG (ref 27–31)
MCHC RBC AUTO-ENTMCNC: 33 G/DL (ref 31–37)
MCV RBC AUTO: 98.5 FL (ref 80–94)
MONOCYTES # BLD AUTO: 0.49 10*3/MM3 (ref 0–1)
MONOCYTES NFR BLD AUTO: 12.5 % (ref 3–8)
NEUTROPHILS # BLD AUTO: 2.04 10*3/MM3 (ref 1.5–8.3)
NEUTROPHILS NFR BLD AUTO: 52.2 % (ref 45–70)
NRBC BLD AUTO-RTO: 0 /100 WBC (ref 0–0)
PLATELET # BLD AUTO: 210 10*3/MM3 (ref 140–500)
POTASSIUM SERPL-SCNC: 5.3 MMOL/L (ref 3.5–5.2)
PROT SERPL-MCNC: 6.6 G/DL (ref 6–8.5)
PSA SERPL-MCNC: 0.29 NG/ML (ref 0–4)
RBC # BLD AUTO: 4.61 10*6/MM3 (ref 4.7–6.1)
SODIUM SERPL-SCNC: 144 MMOL/L (ref 136–145)
TRIGL SERPL-MCNC: 94 MG/DL (ref 0–150)
VLDLC SERPL CALC-MCNC: 18.8 MG/DL (ref 8–32)
WBC # BLD AUTO: 3.91 10*3/MM3 (ref 4.8–10.8)

## 2018-11-02 LAB — 25(OH)D3+25(OH)D2 SERPL-MCNC: 43 NG/ML (ref 30–100)

## 2018-11-08 ENCOUNTER — OFFICE VISIT (OUTPATIENT)
Dept: FAMILY MEDICINE CLINIC | Facility: CLINIC | Age: 63
End: 2018-11-08

## 2018-11-08 VITALS
HEIGHT: 72 IN | DIASTOLIC BLOOD PRESSURE: 82 MMHG | HEART RATE: 67 BPM | WEIGHT: 210 LBS | SYSTOLIC BLOOD PRESSURE: 140 MMHG | OXYGEN SATURATION: 92 % | BODY MASS INDEX: 28.44 KG/M2

## 2018-11-08 DIAGNOSIS — M15.9 PRIMARY OSTEOARTHRITIS INVOLVING MULTIPLE JOINTS: ICD-10-CM

## 2018-11-08 DIAGNOSIS — E78.2 MIXED HYPERLIPIDEMIA: Primary | ICD-10-CM

## 2018-11-08 DIAGNOSIS — R73.02 GLUCOSE INTOLERANCE (IMPAIRED GLUCOSE TOLERANCE): ICD-10-CM

## 2018-11-08 DIAGNOSIS — E55.9 VITAMIN D DEFICIENCY: ICD-10-CM

## 2018-11-08 DIAGNOSIS — K21.9 GASTROESOPHAGEAL REFLUX DISEASE WITHOUT ESOPHAGITIS: ICD-10-CM

## 2018-11-08 PROCEDURE — G0402 INITIAL PREVENTIVE EXAM: HCPCS | Performed by: FAMILY MEDICINE

## 2018-11-08 PROCEDURE — 99213 OFFICE O/P EST LOW 20 MIN: CPT | Performed by: FAMILY MEDICINE

## 2018-11-08 RX ORDER — METOPROLOL SUCCINATE 25 MG/1
25 TABLET, EXTENDED RELEASE ORAL DAILY
Qty: 90 TABLET | Refills: 1 | Status: SHIPPED | OUTPATIENT
Start: 2018-11-08 | End: 2019-12-02 | Stop reason: SDUPTHER

## 2018-11-08 NOTE — PROGRESS NOTES
QUICK REFERENCE INFORMATION:  The ABCs of Providing the Welcome to Medicare Wellness Visit   CMS.gov Learning Network    Welcome to Medicare Visit    Subjective   History of Present Illness    Alfred Vazquez is a 63 y.o. male an established patient presenting for a Welcome to Medicare Visit. In addition, we addressed the following health issues:  Elevated blood pressure at today's appointment.  He has lost 7 pounds since his last appointment.  Pt had fasting labs prior to this appointment for stable HLD, stable Glucose Intolerance and stable Vitamin D Def.  Pt is currently prescribed Metoprolol 25 mg daily and he tolerates that well.  He does have a history of GERD for which he takes Famotidine 10 mg daily.  He is also prescribed Meloxicam 15 mg daily to control symptoms of osteoarthritis.  He tolerates these medications well also.  Pt is doing well overall and is without acute complaint.      PMH, PSH, SocHx, FamHx, Allergies, and Medications: Reviewed and updated in the Visit Navigator.     Outpatient Medications Prior to Visit   Medication Sig Dispense Refill   • aspirin 81 MG chewable tablet Chew 1 tablet daily.     • CALCIUM CARBONATE-VITAMIN D PO Take  by mouth.     • famotidine (PEPCID) 10 MG tablet Take 10 mg by mouth Daily.     • flecainide (TAMBOCOR) 100 MG tablet Take 100 mg by mouth 2 (Two) Times a Day.     • meloxicam (MOBIC) 15 MG tablet TAKE ONE TABLET BY MOUTH DAILY 90 tablet 0   • Omega-3 Fatty Acids (FISH OIL) 1200 MG capsule capsule Take  by mouth.     • sildenafil (REVATIO) 20 MG tablet TAKE 2-5 TABLETS BY MOUTH 1 HOUR PRIOR TO EVENT 30 tablet 2   • metoprolol succinate XL (TOPROL-XL) 25 MG 24 hr tablet Take 1 tablet by mouth Daily. 90 tablet 1   • sildenafil (REVATIO) 20 MG tablet 2-5 po 1 hour before event 90 tablet 1     No facility-administered medications prior to visit.        Patient Active Problem List   Diagnosis   • Atrial fibrillation with rapid ventricular response (CMS/HCC)   •  Degeneration of intervertebral disc of lumbar region   • Gastroesophageal reflux disease   • Hyperlipidemia   • Insomnia   • Osteoarthritis of multiple joints   • Seasonal allergic rhinitis   • Erectile dysfunction   • Glucose intolerance (impaired glucose tolerance)   • Onychomycosis of right great toe   • Benign nodular prostatic hyperplasia without lower urinary tract symptoms   • Abdominal aortic aneurysm (CMS/HCC)   • Vitamin D deficiency       Health Habits:  Dental Exam. up to date  Eye Exam. up to date  Exercise: 5 times/week.  Current exercise activities include: walking.    Social:  See review in SnapShot activity and in SocHx section of Visit Navigator.    Health Risk Assessment:  The patient has completed a Health Risk Assessment. This has been reviewed with them and has been scanned into [employer] as a separate document.    Current Medical Providers:  Patient Care Team:  Julio Watson DO as PCP - General    The [facility] providers who are involved in the care of this patient are listed above. Additional providers and suppliers are listed below:       Recent Hospitalizations:  No recent hospitalization(s)..     Age-appropriate Screening Schedule:  Refer to the list below for future screening recommendations based on patient's age. Orders for these recommended tests are listed in the plan section. The patient has been provided with a written plan.    Health Maintenance   Topic Date Due   • TDAP/TD VACCINES (1 - Tdap) 10/13/1972   • ZOSTER VACCINE (2 of 3) 01/07/2014   • HEPATITIS C SCREENING  02/22/2016   • MEDICARE ANNUAL WELLNESS  02/22/2016   • DIABETIC FOOT EXAM  02/22/2016   • DIABETIC EYE EXAM  02/22/2016   • URINE MICROALBUMIN  03/21/2016   • HEMOGLOBIN A1C  05/01/2019   • LIPID PANEL  11/01/2019   • PNEUMOCOCCAL VACCINES (65+ LOW/MEDIUM RISK) (2 of 2 - PPSV23) 11/02/2022   • COLONOSCOPY  10/29/2025   • INFLUENZA VACCINE  Completed   • AAA SCREEN (ONE-TIME)  Completed       Depression Screen:    PHQ-2/PHQ-9 Depression Screening 11/8/2018   Little interest or pleasure in doing things 0   Feeling down, depressed, or hopeless 0   Total Score 0       Functional and Cognitive Screening:  Functional & Cognitive Status 11/8/2018   Do you have difficulty preparing food and eating? No   Do you have difficulty bathing yourself, getting dressed or grooming yourself? No   Do you have difficulty using the toilet? No   Do you have difficulty moving around from place to place? No   Do you have trouble with steps or getting out of a bed or a chair? No   In the past year have you fallen or experienced a near fall? No   Current Diet Unhealthy Diet   Dental Exam Up to date   Eye Exam Up to date   Exercise (times per week) 5 times per week   Current Exercise Activities Include Walking   Do you need help using the phone?  No   Are you deaf or do you have serious difficulty hearing?  No   Do you need help with transportation? No   Do you need help shopping? No   Do you need help preparing meals?  No   Do you need help with housework?  No   Do you need help with laundry? No   Do you need help taking your medications? No   Do you need help managing money? No   Do you ever drive or ride in a car without wearing a seat belt? No   Have you felt unusual stress, anger or loneliness in the last month? No   Who do you live with? Spouse   If you need help, do you have trouble finding someone available to you? No   Have you been bothered in the last four weeks by sexual problems? No   Do you have difficulty concentrating, remembering or making decisions? No       Does the patient have evidence of cognitive impairment? No    Advanced Care Planning:  has NO advance directive - not interested in additional information    Identification of Risk Factors:  Risk factors include: weight , unhealthy diet, cardiovascular risk and inactivity.    Review of Systems   Cardiovascular: Negative for chest pain, palpitations and leg swelling.        HLD  "  Endocrine: Negative for cold intolerance and heat intolerance.        Glucose Intolerance  Vitamin D Def   Musculoskeletal: Positive for arthralgias.   All other systems reviewed and are negative.      Pertinent items are noted in HPI.    Objective    Physical Exam   Constitutional: He is oriented to person, place, and time. He appears well-developed and well-nourished.   HENT:   Head: Normocephalic and atraumatic.   Neck: Trachea normal and phonation normal. Neck supple. Normal carotid pulses present. Carotid bruit is not present. No thyroid mass and no thyromegaly present.   Cardiovascular: Normal rate, regular rhythm and normal heart sounds.  Exam reveals no gallop and no friction rub.    No murmur heard.  Pulmonary/Chest: Effort normal and breath sounds normal. No respiratory distress. He has no decreased breath sounds. He has no wheezes. He has no rhonchi. He has no rales.   Lymphadenopathy:     He has no cervical adenopathy.   Neurological: He is alert and oriented to person, place, and time.   Skin: Skin is warm and dry. No rash noted.   Psychiatric: He has a normal mood and affect. His speech is normal and behavior is normal. Judgment and thought content normal. Cognition and memory are normal.   Nursing note and vitals reviewed.      Vitals:    11/08/18 0820   BP: 140/82   BP Location: Left arm   Patient Position: Sitting   Pulse: 67   SpO2: 92%   Weight: 95.3 kg (210 lb)   Height: 182.9 cm (72\")   PainSc: 0-No pain     BP Readings from Last 3 Encounters:   11/08/18 140/82   05/17/18 100/74   11/02/17 118/78      Wt Readings from Last 3 Encounters:   11/08/18 95.3 kg (210 lb)   05/17/18 98.4 kg (217 lb)   11/02/17 99.3 kg (219 lb)      Body mass index is 28.48 kg/m².    Orders Only on 11/01/2018   Component Date Value Ref Range Status   • 25 Hydroxy, Vitamin D 11/01/2018 43.0  30.0 - 100.0 ng/mL Final    Comment: Vitamin D deficiency has been defined by the [place] of  Medicine and an Endocrine Society " practice guideline as a  level of serum 25-OH vitamin D less than 20 ng/mL (1,2).  The Endocrine Society went on to further define vitamin D  insufficiency as a level between 21 and 29 ng/mL (2).  1. IOM ([place] of Medicine). 2010. Dietary reference     intakes for calcium and D. [place]: The     National Academies Press.  2. Kwadwo MF, Sherrie NC, Geoffrey VENEGAS, et al.     Evaluation, treatment, and prevention of vitamin D     deficiency: an Endocrine Society clinical practice     guideline. JCEM. 2011 Jul; 96(4):2261-30.     Orders Only on 09/18/2018   Component Date Value Ref Range Status   • WBC 11/01/2018 3.91* 4.80 - 10.80 10*3/mm3 Final   • RBC 11/01/2018 4.61* 4.70 - 6.10 10*6/mm3 Final   • Hemoglobin 11/01/2018 15.0  14.0 - 18.0 g/dL Final   • Hematocrit 11/01/2018 45.4  42.0 - 52.0 % Final   • MCV 11/01/2018 98.5* 80.0 - 94.0 fL Final   • MCH 11/01/2018 32.5* 27.0 - 31.0 pg Final   • MCHC 11/01/2018 33.0  31.0 - 37.0 g/dL Final   • RDW 11/01/2018 13.1  11.5 - 14.5 % Final   • Platelets 11/01/2018 210  140 - 500 10*3/mm3 Final   • Neutrophil Rel % 11/01/2018 52.2  45.0 - 70.0 % Final   • Lymphocyte Rel % 11/01/2018 28.4  20.0 - 45.0 % Final   • Monocyte Rel % 11/01/2018 12.5* 3.0 - 8.0 % Final   • Eosinophil Rel % 11/01/2018 5.6* 0.0 - 4.0 % Final   • Basophil Rel % 11/01/2018 0.8  0.0 - 2.0 % Final   • Neutrophils Absolute 11/01/2018 2.04  1.50 - 8.30 10*3/mm3 Final   • Lymphocytes Absolute 11/01/2018 1.11  0.60 - 4.80 10*3/mm3 Final   • Monocytes Absolute 11/01/2018 0.49  0.00 - 1.00 10*3/mm3 Final   • Eosinophils Absolute 11/01/2018 0.22  0.10 - 0.30 10*3/mm3 Final   • Basophils Absolute 11/01/2018 0.03  0.00 - 0.20 10*3/mm3 Final   • Immature Granulocyte Rel % 11/01/2018 0.5  0.0 - 0.5 % Final   • Immature Grans Absolute 11/01/2018 0.02  0.00 - 0.03 10*3/mm3 Final   • nRBC 11/01/2018 0.0  0.0 - 0.0 /100 WBC Final   • Glucose 11/01/2018 117* 65 - 99 mg/dL Final   • BUN 11/01/2018 36* 8 -  23 mg/dL Final   • Creatinine 11/01/2018 0.86  0.76 - 1.27 mg/dL Final   • eGFR Non  Am 11/01/2018 89  >60 mL/min/1.73 Final   • eGFR African Am 11/01/2018 108  >60 mL/min/1.73 Final   • BUN/Creatinine Ratio 11/01/2018 41.9* 7.0 - 25.0 Final   • Sodium 11/01/2018 144  136 - 145 mmol/L Final   • Potassium 11/01/2018 5.3* 3.5 - 5.2 mmol/L Final   • Chloride 11/01/2018 105  98 - 107 mmol/L Final   • Total CO2 11/01/2018 27.8  22.0 - 29.0 mmol/L Final   • Calcium 11/01/2018 9.2  8.8 - 10.5 mg/dL Final   • Total Protein 11/01/2018 6.6  6.0 - 8.5 g/dL Final   • Albumin 11/01/2018 4.40  3.50 - 5.20 g/dL Final   • Globulin 11/01/2018 2.2  gm/dL Final   • A/G Ratio 11/01/2018 2.0  g/dL Final   • Total Bilirubin 11/01/2018 0.6  0.2 - 1.2 mg/dL Final   • Alkaline Phosphatase 11/01/2018 51  40 - 129 U/L Final   • AST (SGOT) 11/01/2018 20  5 - 40 U/L Final   • ALT (SGPT) 11/01/2018 18  5 - 41 U/L Final   • Hemoglobin A1C 11/01/2018 6.00* 4.80 - 5.60 % Final   • Total Cholesterol 11/01/2018 201* 0 - 200 mg/dL Final   • Triglycerides 11/01/2018 94  0 - 150 mg/dL Final   • HDL Cholesterol 11/01/2018 74* 40 - 60 mg/dL Final   • VLDL Cholesterol 11/01/2018 18.8  8 - 32 mg/dL Final   • LDL Cholesterol  11/01/2018 108* 0 - 100 mg/dL Final   • PSA 11/01/2018 0.291  0.000 - 4.000 ng/mL Final    Comment: The total PSA (tPSA) method performed at [facility] is a  quantitative electrochemiluminescence immunoassay 'ECLIA'         Assessment/Plan   Patient Self-Management and Personalized Health Advice  The patient has been provided with information about: diet, exercise, weight management and prevention of cardiac or vascular disease and preventive services including:   · Advance directive.    Discussed the patient's BMI with him. The BMI is in the acceptable range.    Orders:  Orders Placed This Encounter   Procedures   • Comprehensive Metabolic Panel   • Hemoglobin A1c   • Lipid Panel   • Vitamin D 25 Hydroxy   • CBC & Differential      Patient Instructions     Orders Only on 11/01/2018   Component Date Value Ref Range Status   • 25 Hydroxy, Vitamin D 11/01/2018 43.0  30.0 - 100.0 ng/mL Final    Comment: Vitamin D deficiency has been defined by the [place] of  Medicine and an Endocrine Society practice guideline as a  level of serum 25-OH vitamin D less than 20 ng/mL (1,2).  The Endocrine Society went on to further define vitamin D  insufficiency as a level between 21 and 29 ng/mL (2).  1. IOM ([place] of Medicine). 2010. Dietary reference     intakes for calcium and D. [place]: The     National [employer] Press.  2. Kwadwo MF, Sherrie NC, Geoffrey VENEGAS, et al.     Evaluation, treatment, and prevention of vitamin D     deficiency: an Endocrine Society clinical practice     guideline. JCEM. 2011 Jul; 96(3):1911-30.     Orders Only on 09/18/2018   Component Date Value Ref Range Status   • WBC 11/01/2018 3.91* 4.80 - 10.80 10*3/mm3 Final   • RBC 11/01/2018 4.61* 4.70 - 6.10 10*6/mm3 Final   • Hemoglobin 11/01/2018 15.0  14.0 - 18.0 g/dL Final   • Hematocrit 11/01/2018 45.4  42.0 - 52.0 % Final   • MCV 11/01/2018 98.5* 80.0 - 94.0 fL Final   • MCH 11/01/2018 32.5* 27.0 - 31.0 pg Final   • MCHC 11/01/2018 33.0  31.0 - 37.0 g/dL Final   • RDW 11/01/2018 13.1  11.5 - 14.5 % Final   • Platelets 11/01/2018 210  140 - 500 10*3/mm3 Final   • Neutrophil Rel % 11/01/2018 52.2  45.0 - 70.0 % Final   • Lymphocyte Rel % 11/01/2018 28.4  20.0 - 45.0 % Final   • Monocyte Rel % 11/01/2018 12.5* 3.0 - 8.0 % Final   • Eosinophil Rel % 11/01/2018 5.6* 0.0 - 4.0 % Final   • Basophil Rel % 11/01/2018 0.8  0.0 - 2.0 % Final   • Neutrophils Absolute 11/01/2018 2.04  1.50 - 8.30 10*3/mm3 Final   • Lymphocytes Absolute 11/01/2018 1.11  0.60 - 4.80 10*3/mm3 Final   • Monocytes Absolute 11/01/2018 0.49  0.00 - 1.00 10*3/mm3 Final   • Eosinophils Absolute 11/01/2018 0.22  0.10 - 0.30 10*3/mm3 Final   • Basophils Absolute 11/01/2018 0.03  0.00 - 0.20 10*3/mm3  Final   • Immature Granulocyte Rel % 11/01/2018 0.5  0.0 - 0.5 % Final   • Immature Grans Absolute 11/01/2018 0.02  0.00 - 0.03 10*3/mm3 Final   • nRBC 11/01/2018 0.0  0.0 - 0.0 /100 WBC Final   • Glucose 11/01/2018 117* 65 - 99 mg/dL Final   • BUN 11/01/2018 36* 8 - 23 mg/dL Final   • Creatinine 11/01/2018 0.86  0.76 - 1.27 mg/dL Final   • eGFR Non  Am 11/01/2018 89  >60 mL/min/1.73 Final   • eGFR African Am 11/01/2018 108  >60 mL/min/1.73 Final   • BUN/Creatinine Ratio 11/01/2018 41.9* 7.0 - 25.0 Final   • Sodium 11/01/2018 144  136 - 145 mmol/L Final   • Potassium 11/01/2018 5.3* 3.5 - 5.2 mmol/L Final   • Chloride 11/01/2018 105  98 - 107 mmol/L Final   • Total CO2 11/01/2018 27.8  22.0 - 29.0 mmol/L Final   • Calcium 11/01/2018 9.2  8.8 - 10.5 mg/dL Final   • Total Protein 11/01/2018 6.6  6.0 - 8.5 g/dL Final   • Albumin 11/01/2018 4.40  3.50 - 5.20 g/dL Final   • Globulin 11/01/2018 2.2  gm/dL Final   • A/G Ratio 11/01/2018 2.0  g/dL Final   • Total Bilirubin 11/01/2018 0.6  0.2 - 1.2 mg/dL Final   • Alkaline Phosphatase 11/01/2018 51  40 - 129 U/L Final   • AST (SGOT) 11/01/2018 20  5 - 40 U/L Final   • ALT (SGPT) 11/01/2018 18  5 - 41 U/L Final   • Hemoglobin A1C 11/01/2018 6.00* 4.80 - 5.60 % Final   • Total Cholesterol 11/01/2018 201* 0 - 200 mg/dL Final   • Triglycerides 11/01/2018 94  0 - 150 mg/dL Final   • HDL Cholesterol 11/01/2018 74* 40 - 60 mg/dL Final   • VLDL Cholesterol 11/01/2018 18.8  8 - 32 mg/dL Final   • LDL Cholesterol  11/01/2018 108* 0 - 100 mg/dL Final   • PSA 11/01/2018 0.291  0.000 - 4.000 ng/mL Final    Comment: The total PSA (tPSA) method performed at [facility] is a  quantitative electrochemiluminescence immunoassay 'ECLIA'       Consider taking Vitamin D3 4000 IU daily.  Verify what you are taking with the Calcium you already take and add Vitamin D3 to bring you to 4000 IU daily.  Continue taking other medications as prescribed.        Follow Up:  Return in about 6 months (around  5/8/2019).     An After Visit Summary and PPPS with all of these plans were given to the patient.      Scribed for Julio Watson MD by Ana Canales CMA. 11/08/2018    I, Julio Watson MD personally performed the services described in this documentation, as scribed by Ana Canales CMA in my presence, and it is both accurate and complete

## 2018-11-08 NOTE — PATIENT INSTRUCTIONS
Orders Only on 11/01/2018   Component Date Value Ref Range Status   • 25 Hydroxy, Vitamin D 11/01/2018 43.0  30.0 - 100.0 ng/mL Final    Comment: Vitamin D deficiency has been defined by the [place] of  Medicine and an Endocrine Society practice guideline as a  level of serum 25-OH vitamin D less than 20 ng/mL (1,2).  The Endocrine Society went on to further define vitamin D  insufficiency as a level between 21 and 29 ng/mL (2).  1. IOM ([place] of Medicine). 2010. Dietary reference     intakes for calcium and D. [place]: The     National [employer] Press.  2. Kwadwo MF, Sherrie CACERES, Geoffrey VENEGAS, et al.     Evaluation, treatment, and prevention of vitamin D     deficiency: an Endocrine Society clinical practice     guideline. JCEM. 2011 Jul; 96(5):1911-30.     Orders Only on 09/18/2018   Component Date Value Ref Range Status   • WBC 11/01/2018 3.91* 4.80 - 10.80 10*3/mm3 Final   • RBC 11/01/2018 4.61* 4.70 - 6.10 10*6/mm3 Final   • Hemoglobin 11/01/2018 15.0  14.0 - 18.0 g/dL Final   • Hematocrit 11/01/2018 45.4  42.0 - 52.0 % Final   • MCV 11/01/2018 98.5* 80.0 - 94.0 fL Final   • MCH 11/01/2018 32.5* 27.0 - 31.0 pg Final   • MCHC 11/01/2018 33.0  31.0 - 37.0 g/dL Final   • RDW 11/01/2018 13.1  11.5 - 14.5 % Final   • Platelets 11/01/2018 210  140 - 500 10*3/mm3 Final   • Neutrophil Rel % 11/01/2018 52.2  45.0 - 70.0 % Final   • Lymphocyte Rel % 11/01/2018 28.4  20.0 - 45.0 % Final   • Monocyte Rel % 11/01/2018 12.5* 3.0 - 8.0 % Final   • Eosinophil Rel % 11/01/2018 5.6* 0.0 - 4.0 % Final   • Basophil Rel % 11/01/2018 0.8  0.0 - 2.0 % Final   • Neutrophils Absolute 11/01/2018 2.04  1.50 - 8.30 10*3/mm3 Final   • Lymphocytes Absolute 11/01/2018 1.11  0.60 - 4.80 10*3/mm3 Final   • Monocytes Absolute 11/01/2018 0.49  0.00 - 1.00 10*3/mm3 Final   • Eosinophils Absolute 11/01/2018 0.22  0.10 - 0.30 10*3/mm3 Final   • Basophils Absolute 11/01/2018 0.03  0.00 - 0.20 10*3/mm3 Final   • Immature Granulocyte  Rel % 11/01/2018 0.5  0.0 - 0.5 % Final   • Immature Grans Absolute 11/01/2018 0.02  0.00 - 0.03 10*3/mm3 Final   • nRBC 11/01/2018 0.0  0.0 - 0.0 /100 WBC Final   • Glucose 11/01/2018 117* 65 - 99 mg/dL Final   • BUN 11/01/2018 36* 8 - 23 mg/dL Final   • Creatinine 11/01/2018 0.86  0.76 - 1.27 mg/dL Final   • eGFR Non  Am 11/01/2018 89  >60 mL/min/1.73 Final   • eGFR African Am 11/01/2018 108  >60 mL/min/1.73 Final   • BUN/Creatinine Ratio 11/01/2018 41.9* 7.0 - 25.0 Final   • Sodium 11/01/2018 144  136 - 145 mmol/L Final   • Potassium 11/01/2018 5.3* 3.5 - 5.2 mmol/L Final   • Chloride 11/01/2018 105  98 - 107 mmol/L Final   • Total CO2 11/01/2018 27.8  22.0 - 29.0 mmol/L Final   • Calcium 11/01/2018 9.2  8.8 - 10.5 mg/dL Final   • Total Protein 11/01/2018 6.6  6.0 - 8.5 g/dL Final   • Albumin 11/01/2018 4.40  3.50 - 5.20 g/dL Final   • Globulin 11/01/2018 2.2  gm/dL Final   • A/G Ratio 11/01/2018 2.0  g/dL Final   • Total Bilirubin 11/01/2018 0.6  0.2 - 1.2 mg/dL Final   • Alkaline Phosphatase 11/01/2018 51  40 - 129 U/L Final   • AST (SGOT) 11/01/2018 20  5 - 40 U/L Final   • ALT (SGPT) 11/01/2018 18  5 - 41 U/L Final   • Hemoglobin A1C 11/01/2018 6.00* 4.80 - 5.60 % Final   • Total Cholesterol 11/01/2018 201* 0 - 200 mg/dL Final   • Triglycerides 11/01/2018 94  0 - 150 mg/dL Final   • HDL Cholesterol 11/01/2018 74* 40 - 60 mg/dL Final   • VLDL Cholesterol 11/01/2018 18.8  8 - 32 mg/dL Final   • LDL Cholesterol  11/01/2018 108* 0 - 100 mg/dL Final   • PSA 11/01/2018 0.291  0.000 - 4.000 ng/mL Final    Comment: The total PSA (tPSA) method performed at [facility] is a  quantitative electrochemiluminescence immunoassay 'ECLIA'       Consider taking Vitamin D3 4000 IU daily.  Verify what you are taking with the Calcium you already take and add Vitamin D3 to bring you to 4000 IU daily.  Continue taking other medications as prescribed.

## 2018-11-12 RX ORDER — SILDENAFIL CITRATE 20 MG/1
TABLET ORAL
Qty: 20 TABLET | Refills: 2 | Status: SHIPPED | OUTPATIENT
Start: 2018-11-12 | End: 2019-05-16 | Stop reason: SDUPTHER

## 2018-12-28 RX ORDER — MELOXICAM 15 MG/1
TABLET ORAL
Qty: 90 TABLET | Refills: 0 | Status: SHIPPED | OUTPATIENT
Start: 2018-12-28 | End: 2019-04-02 | Stop reason: SDUPTHER

## 2019-02-18 ENCOUNTER — PREP FOR SURGERY (OUTPATIENT)
Dept: OTHER | Facility: HOSPITAL | Age: 64
End: 2019-02-18

## 2019-02-18 DIAGNOSIS — Z86.010 HX OF ADENOMATOUS COLONIC POLYPS: Primary | ICD-10-CM

## 2019-03-25 ENCOUNTER — OUTSIDE FACILITY SERVICE (OUTPATIENT)
Dept: GASTROENTEROLOGY | Facility: CLINIC | Age: 64
End: 2019-03-25

## 2019-03-25 PROCEDURE — G0105 COLORECTAL SCRN; HI RISK IND: HCPCS | Performed by: INTERNAL MEDICINE

## 2019-04-03 RX ORDER — MELOXICAM 15 MG/1
TABLET ORAL
Qty: 90 TABLET | Refills: 0 | Status: SHIPPED | OUTPATIENT
Start: 2019-04-03 | End: 2019-07-01 | Stop reason: SDUPTHER

## 2019-04-15 DIAGNOSIS — R73.02 GLUCOSE INTOLERANCE (IMPAIRED GLUCOSE TOLERANCE): ICD-10-CM

## 2019-04-15 DIAGNOSIS — E78.2 MIXED HYPERLIPIDEMIA: ICD-10-CM

## 2019-04-15 DIAGNOSIS — E55.9 VITAMIN D DEFICIENCY: ICD-10-CM

## 2019-05-09 LAB
25(OH)D3+25(OH)D2 SERPL-MCNC: 44.6 NG/ML (ref 30–100)
ALBUMIN SERPL-MCNC: 3.9 G/DL (ref 3.5–5.2)
ALBUMIN/GLOB SERPL: 2.1 G/DL
ALP SERPL-CCNC: 46 U/L (ref 39–117)
ALT SERPL-CCNC: 24 U/L (ref 1–41)
AST SERPL-CCNC: 20 U/L (ref 1–40)
BASOPHILS # BLD AUTO: 0.02 10*3/MM3 (ref 0–0.2)
BASOPHILS NFR BLD AUTO: 0.4 % (ref 0–1.5)
BILIRUB SERPL-MCNC: 0.6 MG/DL (ref 0.2–1.2)
BUN SERPL-MCNC: 36 MG/DL (ref 8–23)
BUN/CREAT SERPL: 35 (ref 7–25)
CALCIUM SERPL-MCNC: 9.2 MG/DL (ref 8.6–10.5)
CHLORIDE SERPL-SCNC: 103 MMOL/L (ref 98–107)
CHOLEST SERPL-MCNC: 170 MG/DL (ref 0–200)
CO2 SERPL-SCNC: 25.8 MMOL/L (ref 22–29)
CREAT SERPL-MCNC: 1.03 MG/DL (ref 0.76–1.27)
EOSINOPHIL # BLD AUTO: 0.15 10*3/MM3 (ref 0–0.4)
EOSINOPHIL NFR BLD AUTO: 3 % (ref 0.3–6.2)
ERYTHROCYTE [DISTWIDTH] IN BLOOD BY AUTOMATED COUNT: 12.9 % (ref 12.3–15.4)
GLOBULIN SER CALC-MCNC: 1.9 GM/DL
GLUCOSE SERPL-MCNC: 106 MG/DL (ref 65–99)
HBA1C MFR BLD: 5.9 % (ref 4.8–5.6)
HCT VFR BLD AUTO: 42.5 % (ref 37.5–51)
HDLC SERPL-MCNC: 71 MG/DL (ref 40–60)
HGB BLD-MCNC: 13.9 G/DL (ref 13–17.7)
IMM GRANULOCYTES # BLD AUTO: 0.02 10*3/MM3 (ref 0–0.05)
IMM GRANULOCYTES NFR BLD AUTO: 0.4 % (ref 0–0.5)
LDLC SERPL CALC-MCNC: 88 MG/DL (ref 0–100)
LYMPHOCYTES # BLD AUTO: 1.33 10*3/MM3 (ref 0.7–3.1)
LYMPHOCYTES NFR BLD AUTO: 26.5 % (ref 19.6–45.3)
MCH RBC QN AUTO: 32.4 PG (ref 26.6–33)
MCHC RBC AUTO-ENTMCNC: 32.7 G/DL (ref 31.5–35.7)
MCV RBC AUTO: 99.1 FL (ref 79–97)
MONOCYTES # BLD AUTO: 0.52 10*3/MM3 (ref 0.1–0.9)
MONOCYTES NFR BLD AUTO: 10.4 % (ref 5–12)
NEUTROPHILS # BLD AUTO: 2.98 10*3/MM3 (ref 1.7–7)
NEUTROPHILS NFR BLD AUTO: 59.3 % (ref 42.7–76)
NRBC BLD AUTO-RTO: 0 /100 WBC (ref 0–0.2)
PLATELET # BLD AUTO: 181 10*3/MM3 (ref 140–450)
POTASSIUM SERPL-SCNC: 4.2 MMOL/L (ref 3.5–5.2)
PROT SERPL-MCNC: 5.8 G/DL (ref 6–8.5)
RBC # BLD AUTO: 4.29 10*6/MM3 (ref 4.14–5.8)
SODIUM SERPL-SCNC: 139 MMOL/L (ref 136–145)
TRIGL SERPL-MCNC: 54 MG/DL (ref 0–150)
VLDLC SERPL CALC-MCNC: 10.8 MG/DL
WBC # BLD AUTO: 5.02 10*3/MM3 (ref 3.4–10.8)

## 2019-05-16 ENCOUNTER — OFFICE VISIT (OUTPATIENT)
Dept: FAMILY MEDICINE CLINIC | Facility: CLINIC | Age: 64
End: 2019-05-16

## 2019-05-16 VITALS
OXYGEN SATURATION: 96 % | SYSTOLIC BLOOD PRESSURE: 114 MMHG | HEIGHT: 72 IN | BODY MASS INDEX: 28.71 KG/M2 | HEART RATE: 65 BPM | DIASTOLIC BLOOD PRESSURE: 62 MMHG | WEIGHT: 212 LBS

## 2019-05-16 DIAGNOSIS — K21.9 GASTROESOPHAGEAL REFLUX DISEASE WITHOUT ESOPHAGITIS: ICD-10-CM

## 2019-05-16 DIAGNOSIS — R73.02 GLUCOSE INTOLERANCE (IMPAIRED GLUCOSE TOLERANCE): ICD-10-CM

## 2019-05-16 DIAGNOSIS — E78.2 MIXED HYPERLIPIDEMIA: Primary | ICD-10-CM

## 2019-05-16 DIAGNOSIS — E55.9 VITAMIN D DEFICIENCY: ICD-10-CM

## 2019-05-16 DIAGNOSIS — I48.91 ATRIAL FIBRILLATION WITH RAPID VENTRICULAR RESPONSE (HCC): ICD-10-CM

## 2019-05-16 PROCEDURE — 99213 OFFICE O/P EST LOW 20 MIN: CPT | Performed by: FAMILY MEDICINE

## 2019-05-17 NOTE — PROGRESS NOTES
Subjective   Alfred Vazquez is a 63 y.o. male with   Chief Complaint   Patient presents with   • Hyperlipidemia     follow up on labs and meds   • Hypertension   .    History of Present Illness   Follow-up 63-year-old white male with history of essential hypertension and hyperlipidemia.  Aspirin per day, Pepcid, Tambocor, Mobic, Toprol-XL, and the occasional sildenafil.  He has been trying to control his sugar status and cholesterol by diet alone.  He has been playing golf 4-6 times per week.  Fasting labs have been acquired prior to this visit.  The following portions of the patient's history were reviewed and updated as appropriate: allergies, current medications, past family history, past medical history, past social history, past surgical history and problem list.    Review of Systems   Cardiovascular:        Essential hypertension, hyperlipidemia, atrial fibrillation   Gastrointestinal:        GERD   Musculoskeletal: Positive for arthralgias.       Objective     Vitals:    05/16/19 0806   BP: 114/62   Pulse: 65   SpO2: 96%       No results found for this or any previous visit (from the past 168 hour(s)).    Physical Exam   Constitutional: He is oriented to person, place, and time. He appears well-developed and well-nourished.   HENT:   Head: Normocephalic and atraumatic.   Neck: Trachea normal and phonation normal. Neck supple. Normal carotid pulses present. Carotid bruit is not present. No thyroid mass and no thyromegaly present.   Cardiovascular: Normal rate, regular rhythm and normal heart sounds. Exam reveals no gallop and no friction rub.   No murmur heard.  Pulmonary/Chest: Effort normal and breath sounds normal. No respiratory distress. He has no decreased breath sounds. He has no wheezes. He has no rhonchi. He has no rales.   Lymphadenopathy:     He has no cervical adenopathy.   Neurological: He is alert and oriented to person, place, and time.   Skin: Skin is warm and dry. No rash noted.   Psychiatric:  He has a normal mood and affect. His speech is normal and behavior is normal. Judgment and thought content normal. Cognition and memory are normal.   Nursing note and vitals reviewed.    No visits with results within 4 Week(s) from this visit.   Latest known visit with results is:   Orders Only on 04/15/2019   Component Date Value Ref Range Status   • 25 Hydroxy, Vitamin D 05/09/2019 44.6  30.0 - 100.0 ng/ml Final    Comment: Reference Range for Total Vitamin D 25(OH)  Deficiency <20.0 ng/mL  Insufficiency 21-29 ng/mL  Sufficiency  ng/mL  Toxicity >100 ng/ml     • Total Cholesterol 05/09/2019 170  0 - 200 mg/dL Final   • Triglycerides 05/09/2019 54  0 - 150 mg/dL Final   • HDL Cholesterol 05/09/2019 71* 40 - 60 mg/dL Final   • VLDL Cholesterol 05/09/2019 10.8  mg/dL Final   • LDL Cholesterol  05/09/2019 88  0 - 100 mg/dL Final   • Hemoglobin A1C 05/09/2019 5.90* 4.80 - 5.60 % Final    Comment: Hemoglobin A1C Ranges:  Increased Risk for Diabetes  5.7% to 6.4%  Diabetes                     >= 6.5%  Diabetic Goal                < 7.0%     • Glucose 05/09/2019 106* 65 - 99 mg/dL Final   • BUN 05/09/2019 36* 8 - 23 mg/dL Final   • Creatinine 05/09/2019 1.03  0.76 - 1.27 mg/dL Final   • eGFR Non  Am 05/09/2019 72  >60 mL/min/1.73 Final   • eGFR African Am 05/09/2019 88  >60 mL/min/1.73 Final   • BUN/Creatinine Ratio 05/09/2019 35.0* 7.0 - 25.0 Final   • Sodium 05/09/2019 139  136 - 145 mmol/L Final   • Potassium 05/09/2019 4.2  3.5 - 5.2 mmol/L Final   • Chloride 05/09/2019 103  98 - 107 mmol/L Final   • Total CO2 05/09/2019 25.8  22.0 - 29.0 mmol/L Final   • Calcium 05/09/2019 9.2  8.6 - 10.5 mg/dL Final   • Total Protein 05/09/2019 5.8* 6.0 - 8.5 g/dL Final   • Albumin 05/09/2019 3.90  3.50 - 5.20 g/dL Final   • Globulin 05/09/2019 1.9  gm/dL Final   • A/G Ratio 05/09/2019 2.1  g/dL Final   • Total Bilirubin 05/09/2019 0.6  0.2 - 1.2 mg/dL Final   • Alkaline Phosphatase 05/09/2019 46  39 - 117 U/L Final   •  AST (SGOT) 05/09/2019 20  1 - 40 U/L Final   • ALT (SGPT) 05/09/2019 24  1 - 41 U/L Final   • WBC 05/09/2019 5.02  3.40 - 10.80 10*3/mm3 Final   • RBC 05/09/2019 4.29  4.14 - 5.80 10*6/mm3 Final   • Hemoglobin 05/09/2019 13.9  13.0 - 17.7 g/dL Final   • Hematocrit 05/09/2019 42.5  37.5 - 51.0 % Final   • MCV 05/09/2019 99.1* 79.0 - 97.0 fL Final   • MCH 05/09/2019 32.4  26.6 - 33.0 pg Final   • MCHC 05/09/2019 32.7  31.5 - 35.7 g/dL Final   • RDW 05/09/2019 12.9  12.3 - 15.4 % Final   • Platelets 05/09/2019 181  140 - 450 10*3/mm3 Final   • Neutrophil Rel % 05/09/2019 59.3  42.7 - 76.0 % Final   • Lymphocyte Rel % 05/09/2019 26.5  19.6 - 45.3 % Final   • Monocyte Rel % 05/09/2019 10.4  5.0 - 12.0 % Final   • Eosinophil Rel % 05/09/2019 3.0  0.3 - 6.2 % Final   • Basophil Rel % 05/09/2019 0.4  0.0 - 1.5 % Final   • Neutrophils Absolute 05/09/2019 2.98  1.70 - 7.00 10*3/mm3 Final   • Lymphocytes Absolute 05/09/2019 1.33  0.70 - 3.10 10*3/mm3 Final   • Monocytes Absolute 05/09/2019 0.52  0.10 - 0.90 10*3/mm3 Final   • Eosinophils Absolute 05/09/2019 0.15  0.00 - 0.40 10*3/mm3 Final   • Basophils Absolute 05/09/2019 0.02  0.00 - 0.20 10*3/mm3 Final   • Immature Granulocyte Rel % 05/09/2019 0.4  0.0 - 0.5 % Final   • Immature Grans Absolute 05/09/2019 0.02  0.00 - 0.05 10*3/mm3 Final   • nRBC 05/09/2019 0.0  0.0 - 0.2 /100 WBC Final           Assessment/Plan   Alfred was seen today for hyperlipidemia and hypertension.    Diagnoses and all orders for this visit:    Mixed hyperlipidemia    Atrial fibrillation with rapid ventricular response (CMS/HCC)    Vitamin D deficiency    Gastroesophageal reflux disease without esophagitis    Glucose intolerance (impaired glucose tolerance)        Return in about 6 months (around 11/16/2019) for Recheck.

## 2019-05-29 RX ORDER — SILDENAFIL CITRATE 20 MG/1
TABLET ORAL
Qty: 20 TABLET | Refills: 1 | Status: SHIPPED | OUTPATIENT
Start: 2019-05-29 | End: 2019-10-14 | Stop reason: SDUPTHER

## 2019-07-01 RX ORDER — MELOXICAM 15 MG/1
TABLET ORAL
Qty: 90 TABLET | Refills: 0 | Status: SHIPPED | OUTPATIENT
Start: 2019-07-01 | End: 2019-09-27 | Stop reason: SDUPTHER

## 2019-08-08 ENCOUNTER — OFFICE VISIT (OUTPATIENT)
Dept: FAMILY MEDICINE CLINIC | Facility: CLINIC | Age: 64
End: 2019-08-08

## 2019-08-08 ENCOUNTER — HOSPITAL ENCOUNTER (OUTPATIENT)
Dept: GENERAL RADIOLOGY | Facility: HOSPITAL | Age: 64
Discharge: HOME | End: 2019-08-08
Admitting: FAMILY MEDICINE

## 2019-08-08 VITALS
OXYGEN SATURATION: 95 % | TEMPERATURE: 97.7 F | HEIGHT: 72 IN | SYSTOLIC BLOOD PRESSURE: 114 MMHG | BODY MASS INDEX: 28.17 KG/M2 | DIASTOLIC BLOOD PRESSURE: 66 MMHG | HEART RATE: 53 BPM | WEIGHT: 208 LBS

## 2019-08-08 DIAGNOSIS — M54.6 THORACIC SPINE PAIN: Primary | ICD-10-CM

## 2019-08-08 DIAGNOSIS — R10.12 LEFT UPPER QUADRANT PAIN: ICD-10-CM

## 2019-08-08 PROCEDURE — 99213 OFFICE O/P EST LOW 20 MIN: CPT | Performed by: FAMILY MEDICINE

## 2019-08-08 PROCEDURE — 72070 X-RAY EXAM THORAC SPINE 2VWS: CPT

## 2019-08-09 NOTE — PROGRESS NOTES
Subjective   Alfred Vazquez is a 63 y.o. male with   Chief Complaint   Patient presents with   • LUQ pain     x2 weeks, will wake him up at night   .    History of Present Illness   63-year-old white male with left upper quadrant abdominal pain that has been present now for 2 to 3 weeks.  Patient denies knowledge of any trauma or initiating event.  He denies mid back pain although does state that certain body position changes increase this discomfort.  There does not seem to be any correlation with food or types of food.  Bowel movements have been normal-brown and formed without constipation, diarrhea, melena or hematochezia.  Patient denies fever and there has been no nausea, vomiting or hematemesis.  Colonoscopy has been performed and was found to be normal within the last year to 18 months.  The following portions of the patient's history were reviewed and updated as appropriate: allergies, current medications, past family history, past medical history, past social history, past surgical history and problem list.    Review of Systems   Cardiovascular: Positive for chest pain.   Gastrointestinal: Positive for abdominal pain.       Objective     Vitals:    08/08/19 1113   BP: 114/66   Pulse: 53   Temp: 97.7 °F (36.5 °C)   SpO2: 95%       No results found for this or any previous visit (from the past 672 hour(s)).    Physical Exam   Constitutional: He is oriented to person, place, and time. He appears well-developed and well-nourished.   HENT:   Head: Normocephalic and atraumatic.   Neck: Trachea normal and phonation normal. Neck supple. Normal carotid pulses present. Carotid bruit is not present. No thyroid mass and no thyromegaly present.   Cardiovascular: Normal rate, regular rhythm and normal heart sounds. Exam reveals no gallop and no friction rub.   No murmur heard.  Pulmonary/Chest: Effort normal and breath sounds normal. No respiratory distress. He has no decreased breath sounds. He has no wheezes. He has no  rhonchi. He has no rales.   Abdominal: Soft. Normal appearance and bowel sounds are normal. He exhibits no distension and no mass. There is no hepatosplenomegaly. There is no tenderness. There is no rigidity, no rebound, no guarding and no CVA tenderness. No hernia.   Musculoskeletal:   Thoracic spine is nontender to palpation.   Lymphadenopathy:     He has no cervical adenopathy.   Neurological: He is alert and oriented to person, place, and time.   Skin: Skin is warm and dry. No rash noted.   Psychiatric: He has a normal mood and affect. His speech is normal and behavior is normal. Judgment and thought content normal. Cognition and memory are normal.   Nursing note and vitals reviewed.      Assessment/Plan   Alfred was seen today for luq pain.    Diagnoses and all orders for this visit:    Thoracic spine pain  -     XR Spine Thoracic 2 View (In Office)    Left upper quadrant pain  -     US Abdomen Complete; Future        Return if symptoms worsen or fail to improve.

## 2019-08-15 ENCOUNTER — HOSPITAL ENCOUNTER (OUTPATIENT)
Dept: ULTRASOUND IMAGING | Facility: HOSPITAL | Age: 64
Discharge: HOME | End: 2019-08-15
Admitting: FAMILY MEDICINE

## 2019-08-15 DIAGNOSIS — R10.12 LEFT UPPER QUADRANT PAIN: ICD-10-CM

## 2019-08-15 PROCEDURE — 76700 US EXAM ABDOM COMPLETE: CPT

## 2019-08-17 DIAGNOSIS — K83.8 COMMON BILE DUCT DILATATION: Primary | ICD-10-CM

## 2019-08-20 ENCOUNTER — HOSPITAL ENCOUNTER (OUTPATIENT)
Dept: NUCLEAR MEDICINE | Facility: HOSPITAL | Age: 64
Discharge: HOME | End: 2019-08-20

## 2019-08-20 DIAGNOSIS — K83.8 COMMON BILE DUCT DILATATION: ICD-10-CM

## 2019-08-20 PROCEDURE — 78227 HEPATOBIL SYST IMAGE W/DRUG: CPT

## 2019-08-20 PROCEDURE — 25010000002 SINCALIDE PER 5 MCG: Performed by: FAMILY MEDICINE

## 2019-08-20 PROCEDURE — 0 TECHNETIUM TC 99M MEBROFENIN KIT: Performed by: FAMILY MEDICINE

## 2019-08-20 PROCEDURE — A9537 TC99M MEBROFENIN: HCPCS | Performed by: FAMILY MEDICINE

## 2019-08-20 RX ORDER — KIT FOR THE PREPARATION OF TECHNETIUM TC 99M MEBROFENIN 45 MG/10ML
1 INJECTION, POWDER, LYOPHILIZED, FOR SOLUTION INTRAVENOUS
Status: COMPLETED | OUTPATIENT
Start: 2019-08-20 | End: 2019-08-20

## 2019-08-20 RX ADMIN — SINCALIDE 1.9 MCG: 5 INJECTION, POWDER, LYOPHILIZED, FOR SOLUTION INTRAVENOUS at 13:08

## 2019-08-20 RX ADMIN — MEBROFENIN 1 DOSE: 45 INJECTION, POWDER, LYOPHILIZED, FOR SOLUTION INTRAVENOUS at 12:43

## 2019-08-27 ENCOUNTER — TELEPHONE (OUTPATIENT)
Dept: FAMILY MEDICINE CLINIC | Facility: CLINIC | Age: 64
End: 2019-08-27

## 2019-09-27 RX ORDER — MELOXICAM 15 MG/1
TABLET ORAL
Qty: 90 TABLET | Refills: 0 | Status: SHIPPED | OUTPATIENT
Start: 2019-09-27 | End: 2019-12-27

## 2019-10-03 ENCOUNTER — FLU SHOT (OUTPATIENT)
Dept: FAMILY MEDICINE CLINIC | Facility: CLINIC | Age: 64
End: 2019-10-03

## 2019-10-03 DIAGNOSIS — Z23 IMMUNIZATION, PNEUMOCOCCUS AND INFLUENZA: ICD-10-CM

## 2019-10-03 PROCEDURE — 90653 IIV ADJUVANT VACCINE IM: CPT | Performed by: FAMILY MEDICINE

## 2019-10-03 PROCEDURE — G0008 ADMIN INFLUENZA VIRUS VAC: HCPCS | Performed by: FAMILY MEDICINE

## 2019-10-14 RX ORDER — SILDENAFIL CITRATE 20 MG/1
TABLET ORAL
Qty: 20 TABLET | Refills: 0 | Status: SHIPPED | OUTPATIENT
Start: 2019-10-14 | End: 2019-12-16 | Stop reason: SDUPTHER

## 2019-11-05 DIAGNOSIS — E78.2 MIXED HYPERLIPIDEMIA: Primary | ICD-10-CM

## 2019-11-05 DIAGNOSIS — R73.02 GLUCOSE INTOLERANCE (IMPAIRED GLUCOSE TOLERANCE): ICD-10-CM

## 2019-11-05 DIAGNOSIS — E55.9 VITAMIN D DEFICIENCY: ICD-10-CM

## 2019-11-05 DIAGNOSIS — Z12.5 SPECIAL SCREENING FOR MALIGNANT NEOPLASM OF PROSTATE: ICD-10-CM

## 2019-11-07 LAB
25(OH)D3+25(OH)D2 SERPL-MCNC: 56.2 NG/ML (ref 30–100)
ALBUMIN SERPL-MCNC: 4.1 G/DL (ref 3.6–4.8)
ALBUMIN/GLOB SERPL: 2.2 {RATIO} (ref 1.2–2.2)
ALP SERPL-CCNC: 53 IU/L (ref 39–117)
ALT SERPL-CCNC: 20 IU/L (ref 0–44)
AST SERPL-CCNC: 21 IU/L (ref 0–40)
BASOPHILS # BLD AUTO: 0 X10E3/UL (ref 0–0.2)
BASOPHILS NFR BLD AUTO: 0 %
BILIRUB SERPL-MCNC: 0.8 MG/DL (ref 0–1.2)
BUN SERPL-MCNC: 27 MG/DL (ref 8–27)
BUN/CREAT SERPL: 30 (ref 10–24)
CALCIUM SERPL-MCNC: 8.9 MG/DL (ref 8.6–10.2)
CHLORIDE SERPL-SCNC: 103 MMOL/L (ref 96–106)
CHOLEST SERPL-MCNC: 184 MG/DL (ref 100–199)
CO2 SERPL-SCNC: 24 MMOL/L (ref 20–29)
CREAT SERPL-MCNC: 0.91 MG/DL (ref 0.76–1.27)
EOSINOPHIL # BLD AUTO: 0.2 X10E3/UL (ref 0–0.4)
EOSINOPHIL NFR BLD AUTO: 6 %
ERYTHROCYTE [DISTWIDTH] IN BLOOD BY AUTOMATED COUNT: 14.1 % (ref 12.3–15.4)
GLOBULIN SER CALC-MCNC: 1.9 G/DL (ref 1.5–4.5)
GLUCOSE SERPL-MCNC: 103 MG/DL (ref 65–99)
HBA1C MFR BLD: 5.8 % (ref 4.8–5.6)
HCT VFR BLD AUTO: 43.7 % (ref 37.5–51)
HDLC SERPL-MCNC: 62 MG/DL
HGB BLD-MCNC: 14.3 G/DL (ref 13–17.7)
IMM GRANULOCYTES # BLD AUTO: 0 X10E3/UL (ref 0–0.1)
IMM GRANULOCYTES NFR BLD AUTO: 0 %
LDLC SERPL CALC-MCNC: 108 MG/DL (ref 0–99)
LYMPHOCYTES # BLD AUTO: 1.3 X10E3/UL (ref 0.7–3.1)
LYMPHOCYTES NFR BLD AUTO: 34 %
MCH RBC QN AUTO: 32 PG (ref 26.6–33)
MCHC RBC AUTO-ENTMCNC: 32.7 G/DL (ref 31.5–35.7)
MCV RBC AUTO: 98 FL (ref 79–97)
MONOCYTES # BLD AUTO: 0.4 X10E3/UL (ref 0.1–0.9)
MONOCYTES NFR BLD AUTO: 10 %
NEUTROPHILS # BLD AUTO: 1.9 X10E3/UL (ref 1.4–7)
NEUTROPHILS NFR BLD AUTO: 50 %
PLATELET # BLD AUTO: 207 X10E3/UL (ref 150–450)
POTASSIUM SERPL-SCNC: 4.5 MMOL/L (ref 3.5–5.2)
PROT SERPL-MCNC: 6 G/DL (ref 6–8.5)
PSA SERPL-MCNC: 0.3 NG/ML (ref 0–4)
RBC # BLD AUTO: 4.47 X10E6/UL (ref 4.14–5.8)
SODIUM SERPL-SCNC: 140 MMOL/L (ref 134–144)
TRIGL SERPL-MCNC: 70 MG/DL (ref 0–149)
VLDLC SERPL CALC-MCNC: 14 MG/DL (ref 5–40)
WBC # BLD AUTO: 3.8 X10E3/UL (ref 3.4–10.8)

## 2019-11-13 ENCOUNTER — TELEPHONE (OUTPATIENT)
Dept: FAMILY MEDICINE CLINIC | Facility: CLINIC | Age: 64
End: 2019-11-13

## 2019-11-13 ENCOUNTER — OFFICE VISIT (OUTPATIENT)
Dept: FAMILY MEDICINE CLINIC | Facility: CLINIC | Age: 64
End: 2019-11-13

## 2019-11-13 VITALS
BODY MASS INDEX: 28.44 KG/M2 | OXYGEN SATURATION: 98 % | DIASTOLIC BLOOD PRESSURE: 70 MMHG | HEIGHT: 72 IN | HEART RATE: 60 BPM | WEIGHT: 210 LBS | SYSTOLIC BLOOD PRESSURE: 118 MMHG

## 2019-11-13 DIAGNOSIS — E78.2 MIXED HYPERLIPIDEMIA: ICD-10-CM

## 2019-11-13 DIAGNOSIS — E55.9 VITAMIN D DEFICIENCY: ICD-10-CM

## 2019-11-13 DIAGNOSIS — I71.40 ABDOMINAL AORTIC ANEURYSM (AAA) WITHOUT RUPTURE (HCC): ICD-10-CM

## 2019-11-13 DIAGNOSIS — I48.91 ATRIAL FIBRILLATION WITH RAPID VENTRICULAR RESPONSE (HCC): ICD-10-CM

## 2019-11-13 DIAGNOSIS — K21.9 GASTROESOPHAGEAL REFLUX DISEASE WITHOUT ESOPHAGITIS: ICD-10-CM

## 2019-11-13 DIAGNOSIS — Z23 IMMUNIZATION DUE: ICD-10-CM

## 2019-11-13 DIAGNOSIS — Z00.00 MEDICARE ANNUAL WELLNESS VISIT, SUBSEQUENT: Primary | ICD-10-CM

## 2019-11-13 DIAGNOSIS — R73.02 GLUCOSE INTOLERANCE (IMPAIRED GLUCOSE TOLERANCE): ICD-10-CM

## 2019-11-13 PROCEDURE — 99213 OFFICE O/P EST LOW 20 MIN: CPT | Performed by: FAMILY MEDICINE

## 2019-11-13 PROCEDURE — G0439 PPPS, SUBSEQ VISIT: HCPCS | Performed by: FAMILY MEDICINE

## 2019-11-13 RX ORDER — ZOSTER VACCINE RECOMBINANT, ADJUVANTED 50 MCG/0.5
50 KIT INTRAMUSCULAR
Qty: 1 EACH | Refills: 1 | Status: SHIPPED | OUTPATIENT
Start: 2019-11-13 | End: 2020-02-13

## 2019-11-13 NOTE — TELEPHONE ENCOUNTER
I would send him to see either Dr. Hebert Villegas or Dr. Hebert jensen-both are very good chiropractors

## 2019-11-13 NOTE — TELEPHONE ENCOUNTER
Called to inform pt that dr. Watson recommended he go see either Dr. Hebert Villegas or Dr. Hebert Valdez. Both chiropractors.

## 2019-11-13 NOTE — PROGRESS NOTES
QUICK REFERENCE INFORMATION:  The ABCs of Providing the Annual Wellness Visit   CMS.gov [employer] Subsequent Wellness Visit      Subjective   History of Present Illness    Alfred Vazquez is a 64 y.o. male who presents for an Subsequent Wellness Visit. In addition, we addressed the following health issues: 64-year-old white male here for further medical management of atrial fibrillation with a rapid ventricular response, mixed hyperlipidemia as well as GERD and vitamin D deficiency.  There is also history of glucose intolerance as well as arthritis at multiple levels.  Patient is retired and is playing golf approximately 4-5 times per week.  There is also a history of BPH without lower urinary tract symptoms.  Fasting labs have been acquired prior to this visit.  Medications include Toprol-XL at 25 mg daily as well as flecainide at 100 mg daily.  Patient is also using meloxicam on an as-needed basis and also uses over-the-counter vitamin D3.  He will use famotidine for GERD.  He is otherwise doing well and has no acute complaints.      PMH, PSH, SocHx, FamHx, Allergies, and Medications: Reviewed and updated in the Visit Navigator.     Outpatient Medications Prior to Visit   Medication Sig Dispense Refill   • aspirin 81 MG chewable tablet Chew 1 tablet daily.     • CALCIUM CARBONATE-VITAMIN D PO Take  by mouth.     • Cholecalciferol (VITAMIN D3) 125 MCG (5000 UT) capsule capsule Take 5,000 Units by mouth Daily.     • famotidine (PEPCID) 10 MG tablet Take 10 mg by mouth Daily.     • flecainide (TAMBOCOR) 100 MG tablet Take 100 mg by mouth 2 (Two) Times a Day.     • meloxicam (MOBIC) 15 MG tablet TAKE ONE TABLET BY MOUTH DAILY 90 tablet 0   • metoprolol succinate XL (TOPROL-XL) 25 MG 24 hr tablet Take 1 tablet by mouth Daily. 90 tablet 1   • Omega-3 Fatty Acids (FISH OIL) 1200 MG capsule capsule Take  by mouth.     • PREBIOTIC PRODUCT PO Take  by mouth.     • sildenafil (REVATIO) 20 MG tablet TAKE 2 TO  5 TABLETS BY MOUTH 1 HOUR PRIOR TO EVENT 20 tablet 0     No facility-administered medications prior to visit.        Patient Active Problem List   Diagnosis   • Atrial fibrillation with rapid ventricular response (CMS/HCC)   • Degeneration of intervertebral disc of lumbar region   • Gastroesophageal reflux disease   • Mixed hyperlipidemia   • Insomnia   • Osteoarthritis of multiple joints   • Seasonal allergic rhinitis   • Erectile dysfunction   • Glucose intolerance (impaired glucose tolerance)   • Onychomycosis of right great toe   • Benign nodular prostatic hyperplasia without lower urinary tract symptoms   • Abdominal aortic aneurysm (CMS/HCC)   • Vitamin D deficiency       Health Habits:  Dental Exam. up to date  Eye Exam. up to date  Exercise: 7 times/week.  Current exercise activities include: walking    Social:  See review in SnapShot activity and in SocHx section of Visit Navigator.    Health Risk Assessment:  The patient has completed a Health Risk Assessment. This has been reviewed with them and has been scanned into OnBase as a separate document.    Current Medical Providers:  Patient Care Team:  Julio Watson DO as PCP - General  Soarya, Darvin Garner DPM as PCP - Claims Attributed    The [facility] providers who are involved in the care of this patient are listed above. Additional providers and suppliers are listed below:  Khanh Kenyon MD, Dr. Day of the podiatry service.    Recent Hospitalizations:  No recent hospitalization(s)..    Age-appropriate Screening Schedule:  Refer to the list below for future screening recommendations based on patient's age. Orders for these recommended tests are listed in the plan section. The patient has been provided with a written plan.    Health Maintenance   Topic Date Due   • HEPATITIS C SCREENING  02/22/2016   • ZOSTER VACCINE (2 of 3) 11/13/2020 (Originally 1/7/2014)   • HEMOGLOBIN A1C  05/06/2020   • DIABETIC EYE EXAM  09/10/2020   • LIPID PANEL   11/06/2020   • MEDICARE ANNUAL WELLNESS  11/13/2020   • DIABETIC FOOT EXAM  11/13/2020   • URINE MICROALBUMIN  11/13/2020   • PNEUMOCOCCAL VACCINES (65+ LOW/MEDIUM RISK) (2 of 2 - PPSV23) 11/02/2022   • TDAP/TD VACCINES (2 - Td) 11/13/2022   • COLONOSCOPY  03/25/2024   • INFLUENZA VACCINE  Completed   • AAA SCREEN (ONE-TIME)  Completed       Depression Screen:   PHQ-2/PHQ-9 Depression Screening 11/13/2019   Little interest or pleasure in doing things 0   Feeling down, depressed, or hopeless 0   Total Score 0       Functional and Cognitive Screening:  Functional & Cognitive Status 11/13/2019   Do you have difficulty preparing food and eating? No   Do you have difficulty bathing yourself, getting dressed or grooming yourself? No   Do you have difficulty using the toilet? No   Do you have difficulty moving around from place to place? No   Do you have trouble with steps or getting out of a bed or a chair? No   Current Diet Limited Junk Food   Dental Exam Up to date   Eye Exam Up to date   Exercise (times per week) 7 times per week   Current Exercise Activities Include Walking   Do you need help using the phone?  No   Are you deaf or do you have serious difficulty hearing?  Yes   Do you need help with transportation? No   Do you need help shopping? No   Do you need help preparing meals?  No   Do you need help with housework?  No   Do you need help with laundry? No   Do you need help taking your medications? No   Do you need help managing money? No   Do you ever drive or ride in a car without wearing a seat belt? No   Have you felt unusual stress, anger or loneliness in the last month? No   Who do you live with? Spouse   If you need help, do you have trouble finding someone available to you? No   Have you been bothered in the last four weeks by sexual problems? -   Do you have difficulty concentrating, remembering or making decisions? No       Does the patient have evidence of cognitive impairment? No    Identification of  "Risk Factors:  Risk factors include: Abdominal Aortic Aneurysm Screening  Advance Directive Discussion  Cardiovascular risk  Chronic Pain   Colon Cancer Screening  Dementia/Memory   Depression/Dysphoria  Diabetic Lab Screening   Fall Risk  Glaucoma Risk  Immunizations Discussed/Encouraged (specific immunizations; Shingrix )  Prostate Cancer Screening .    Review of Systems   Cardiovascular:        Paroxysmal atrial fibrillation, hyperlipidemia, abdominal aortic aneurysm   Gastrointestinal:        GERD   Genitourinary:        BPH   Musculoskeletal: Positive for arthralgias and back pain.   Allergic/Immunologic: Positive for environmental allergies.   All other systems reviewed and are negative.      Pertinent items are noted in HPI.    Objective     Vitals:    11/13/19 0837   BP: 118/70   Pulse: 60   SpO2: 98%   Weight: 95.3 kg (210 lb)   Height: 182.9 cm (72\")     Physical Exam   Constitutional: He is oriented to person, place, and time. He appears well-developed and well-nourished.   HENT:   Head: Normocephalic and atraumatic.   Neck: Trachea normal and phonation normal. Neck supple. Normal carotid pulses present. Carotid bruit is not present. No thyroid mass and no thyromegaly present.   Cardiovascular: Normal rate, regular rhythm and normal heart sounds. Exam reveals no gallop and no friction rub.   No murmur heard.  Pulmonary/Chest: Effort normal and breath sounds normal. No respiratory distress. He has no decreased breath sounds. He has no wheezes. He has no rhonchi. He has no rales.    Alfred had a diabetic foot exam performed today.   During the foot exam he had a monofilament test performed.    Neurological Sensory Findings -  Altered sharp/dull left ankle/foot discrimination: leg injury inpast, not related to prediabetes.  Lymphadenopathy:     He has no cervical adenopathy.   Neurological: He is alert and oriented to person, place, and time.   Skin: Skin is warm and dry. No rash noted.   Psychiatric: He has " a normal mood and affect. His speech is normal and behavior is normal. Judgment and thought content normal. Cognition and memory are normal.   Nursing note and vitals reviewed.    Office Visit on 11/13/2019   Component Date Value Ref Range Status   • Creatinine, Urine 11/13/2019 135.3  Not Estab. mg/dL Final   • Microalbumin, Urine 11/13/2019 6.0  Not Estab. ug/mL Final   • Microalbumin/Creatinine Ratio 11/13/2019 4.4  0.0 - 30.0 mg/g creat Final    Comment:                      Normal:                0.0 -  30.0                       Albuminuria:          31.0 - 300.0                       Clinical albuminuria:       >300.0     Orders Only on 11/05/2019   Component Date Value Ref Range Status   • WBC 11/06/2019 3.8  3.4 - 10.8 x10E3/uL Final   • RBC 11/06/2019 4.47  4.14 - 5.80 x10E6/uL Final   • Hemoglobin 11/06/2019 14.3  13.0 - 17.7 g/dL Final   • Hematocrit 11/06/2019 43.7  37.5 - 51.0 % Final   • MCV 11/06/2019 98* 79 - 97 fL Final   • MCH 11/06/2019 32.0  26.6 - 33.0 pg Final   • MCHC 11/06/2019 32.7  31.5 - 35.7 g/dL Final   • RDW 11/06/2019 14.1  12.3 - 15.4 % Final   • Platelets 11/06/2019 207  150 - 450 x10E3/uL Final   • Neutrophil Rel % 11/06/2019 50  Not Estab. % Final   • Lymphocyte Rel % 11/06/2019 34  Not Estab. % Final   • Monocyte Rel % 11/06/2019 10  Not Estab. % Final   • Eosinophil Rel % 11/06/2019 6  Not Estab. % Final   • Basophil Rel % 11/06/2019 0  Not Estab. % Final   • Neutrophils Absolute 11/06/2019 1.9  1.4 - 7.0 x10E3/uL Final   • Lymphocytes Absolute 11/06/2019 1.3  0.7 - 3.1 x10E3/uL Final   • Monocytes Absolute 11/06/2019 0.4  0.1 - 0.9 x10E3/uL Final   • Eosinophils Absolute 11/06/2019 0.2  0.0 - 0.4 x10E3/uL Final   • Basophils Absolute 11/06/2019 0.0  0.0 - 0.2 x10E3/uL Final   • Immature Granulocyte Rel % 11/06/2019 0  Not Estab. % Final   • Immature Grans Absolute 11/06/2019 0.0  0.0 - 0.1 x10E3/uL Final   • Glucose 11/06/2019 103* 65 - 99 mg/dL Final   • BUN 11/06/2019 27  8 -  27 mg/dL Final   • Creatinine 11/06/2019 0.91  0.76 - 1.27 mg/dL Final   • eGFR Non  Am 11/06/2019 88  >59 mL/min/1.73 Final   • eGFR African Am 11/06/2019 101  >59 mL/min/1.73 Final   • BUN/Creatinine Ratio 11/06/2019 30* 10 - 24 Final   • Sodium 11/06/2019 140  134 - 144 mmol/L Final   • Potassium 11/06/2019 4.5  3.5 - 5.2 mmol/L Final   • Chloride 11/06/2019 103  96 - 106 mmol/L Final   • Total CO2 11/06/2019 24  20 - 29 mmol/L Final   • Calcium 11/06/2019 8.9  8.6 - 10.2 mg/dL Final   • Total Protein 11/06/2019 6.0  6.0 - 8.5 g/dL Final   • Albumin 11/06/2019 4.1  3.6 - 4.8 g/dL Final   • Globulin 11/06/2019 1.9  1.5 - 4.5 g/dL Final   • A/G Ratio 11/06/2019 2.2  1.2 - 2.2 Final   • Total Bilirubin 11/06/2019 0.8  0.0 - 1.2 mg/dL Final   • Alkaline Phosphatase 11/06/2019 53  39 - 117 IU/L Final   • AST (SGOT) 11/06/2019 21  0 - 40 IU/L Final   • ALT (SGPT) 11/06/2019 20  0 - 44 IU/L Final   • Hemoglobin A1C 11/06/2019 5.8* 4.8 - 5.6 % Final    Comment:          Prediabetes: 5.7 - 6.4           Diabetes: >6.4           Glycemic control for adults with diabetes: <7.0     • Total Cholesterol 11/06/2019 184  100 - 199 mg/dL Final   • Triglycerides 11/06/2019 70  0 - 149 mg/dL Final   • HDL Cholesterol 11/06/2019 62  >39 mg/dL Final   • VLDL Cholesterol 11/06/2019 14  5 - 40 mg/dL Final   • LDL Cholesterol  11/06/2019 108* 0 - 99 mg/dL Final   • 25 Hydroxy, Vitamin D 11/06/2019 56.2  30.0 - 100.0 ng/mL Final    Comment: Vitamin D deficiency has been defined by the Orient of  Medicine and an Endocrine Society practice guideline as a  level of serum 25-OH vitamin D less than 20 ng/mL (1,2).  The Endocrine Society went on to further define vitamin D  insufficiency as a level between 21 and 29 ng/mL (2).  1. IOM (Orient of Medicine). 2010. Dietary reference     intakes for calcium and D. [place]: The     National Academies Press.  2. Kwadwo ZARAGOZA, Sherrie CACERES, Geoffrey VENEGAS, et al.     Evaluation,  treatment, and prevention of vitamin D     deficiency: an Endocrine Society clinical practice     guideline. JCEM. 2011 Jul; 96(7):1911-30.     • PSA 11/06/2019 0.3  0.0 - 4.0 ng/mL Final    Comment: Roche ECLIA methodology.  According to the American Urological Association, Serum PSA should  decrease and remain at undetectable levels after radical  prostatectomy. The AUA defines biochemical recurrence as an initial  PSA value 0.2 ng/mL or greater followed by a subsequent confirmatory  PSA value 0.2 ng/mL or greater.  Values obtained with different assay methods or kits cannot be used  interchangeably. Results cannot be interpreted as absolute evidence  of the presence or absence of malignant disease.           Body mass index is 28.48 kg/m².    Assessment/Plan   Patient Self-Management and Personalized Health Advice  The patient has been provided with information about: diet, exercise, weight management and prevention of cardiac or vascular disease and preventive services including:   · Annual Wellness Visit (AWV)  · Cardiovascular Disease Screening Tests (may do this order every 5 years in beneficiaries without signs or symptoms of cardiovascular disease)  · Colorectal Cancer Screening, Colonoscopy  · Diabetes Screening-Lab Order for either glucose quantitative blood (except reagent strip), glucose;post glucose dose(includes glucose), or glucose tolerance test-3 specimens(includes glucose)  · Glaucoma screening (for individuals with diabetes mellitus, family history of glaucoma, -Americans (> or =) age 50, -Americans (> or =) age 65)  · Prostate Cancer Screening   · Ultrasound Screening for Abdominal Aortic Aneurysm (AAA).    Discussed the patient's BMI with him. The BMI is in the acceptable range.    Orders:  Orders Placed This Encounter   Procedures   • Microalbumin / Creatinine Urine Ratio - Urine, Clean Catch   • Lipid panel   • Hemoglobin A1c   • Comprehensive metabolic panel   • Vitamin D 25  hydroxy   • CBC w AUTO Differential       Follow Up:  Return in about 6 months (around 5/13/2020) for Recheck.     An After Visit Summary and PPPS with all of these plans were given to the patient.

## 2019-11-13 NOTE — TELEPHONE ENCOUNTER
Pt forgot to mention that he has had a stiff sore neck for a short time now.  The pain radiates into both shoulders with the right being worse then the left.  He had imaging of his middle back a few months ago which only showed degeneration.  Is there anything you can recommend to help this?

## 2019-11-14 LAB
ALBUMIN/CREAT UR: 4.4 MG/G CREAT (ref 0–30)
CREAT UR-MCNC: 135.3 MG/DL
MICROALBUMIN UR-MCNC: 6 UG/ML

## 2019-12-03 RX ORDER — METOPROLOL SUCCINATE 25 MG/1
TABLET, EXTENDED RELEASE ORAL
Qty: 90 TABLET | Refills: 0 | Status: SHIPPED | OUTPATIENT
Start: 2019-12-03 | End: 2020-05-28 | Stop reason: SDUPTHER

## 2019-12-16 RX ORDER — SILDENAFIL CITRATE 20 MG/1
TABLET ORAL
Qty: 20 TABLET | Refills: 5 | Status: SHIPPED | OUTPATIENT
Start: 2019-12-16 | End: 2020-11-30

## 2019-12-27 RX ORDER — MELOXICAM 15 MG/1
TABLET ORAL
Qty: 90 TABLET | Refills: 0 | Status: SHIPPED | OUTPATIENT
Start: 2019-12-27 | End: 2020-03-26

## 2020-02-12 ENCOUNTER — TELEPHONE (OUTPATIENT)
Dept: FAMILY MEDICINE CLINIC | Facility: CLINIC | Age: 65
End: 2020-02-12

## 2020-02-13 ENCOUNTER — OFFICE VISIT (OUTPATIENT)
Dept: FAMILY MEDICINE CLINIC | Facility: CLINIC | Age: 65
End: 2020-02-13

## 2020-02-13 ENCOUNTER — HOSPITAL ENCOUNTER (OUTPATIENT)
Dept: GENERAL RADIOLOGY | Facility: HOSPITAL | Age: 65
Discharge: HOME | End: 2020-02-13
Admitting: FAMILY MEDICINE

## 2020-02-13 VITALS
HEART RATE: 59 BPM | HEIGHT: 72 IN | WEIGHT: 219.2 LBS | OXYGEN SATURATION: 99 % | TEMPERATURE: 97.8 F | DIASTOLIC BLOOD PRESSURE: 80 MMHG | SYSTOLIC BLOOD PRESSURE: 120 MMHG | BODY MASS INDEX: 29.69 KG/M2

## 2020-02-13 DIAGNOSIS — M50.30 DEGENERATIVE DISC DISEASE, CERVICAL: ICD-10-CM

## 2020-02-13 DIAGNOSIS — M54.9 UPPER BACK PAIN: ICD-10-CM

## 2020-02-13 DIAGNOSIS — M54.2 NECK PAIN: Primary | ICD-10-CM

## 2020-02-13 DIAGNOSIS — M54.2 NECK PAIN: ICD-10-CM

## 2020-02-13 PROCEDURE — 99213 OFFICE O/P EST LOW 20 MIN: CPT | Performed by: FAMILY MEDICINE

## 2020-02-13 PROCEDURE — 72040 X-RAY EXAM NECK SPINE 2-3 VW: CPT

## 2020-02-13 NOTE — PROGRESS NOTES
Chief Complaint   Patient presents with   • Shoulder Pain   • Neck Pain       Subjective   Alfred Vazquez is a 64 y.o. male.     History of Present Illness     64-year-old male here for neck pain.    States a history of to degenerative disc disease in the cervical region.  Now having right-sided neck pain and pain with neck flexion.  He does not have any radicular symptoms.  Having this for the past 6 to 8 months.  Has not done anything for it.  Has been to physical therapy but that was many years ago for a similar problem.  No weakness no numbness no tingling.  He is very active and plays a lot of golf.    Also having some buttock pain that resolves when he goes from sitting to standing and then walks for a few minutes.  No groin pain no history of any hip arthritis no other symptoms.      The following portions of the patient's history were reviewed and updated as appropriate: allergies, current medications, past family history, past medical history, past social history, past surgical history and problem list.      Past Medical History:   Diagnosis Date   • Acute upper respiratory infection    • Allergic rhinitis    • Arthritis    • Atrial fibrillation (CMS/HCC)    • Benign prostatic hypertrophy    • DDD (degenerative disc disease), cervical     C-SPINE   • ED (erectile dysfunction) of non-organic origin    • Glucose intolerance (malabsorption)    • Hordeolum     INTERNAL   • Lateral epicondylitis of right elbow    • Nausea and vomiting    • Obesity    • Patellar tendinitis of left knee    • Precordial chest pain    • Urethritis        Past Surgical History:   Procedure Laterality Date   • TONSILLECTOMY AND ADENOIDECTOMY     • TOOTH EXTRACTION         Family History   Problem Relation Age of Onset   • No Known Problems Mother    • Aneurysm Father         AORTIC   • Other Father         EYE PROBLEMS, TOTAL KNEE REPLACEMENT   • Lung cancer Father    • Obesity Sister    • Obesity Brother    • Stroke Other    •  Diabetes Other    • Suicide Attempts Other        Social History     Socioeconomic History   • Marital status:      Spouse name: Not on file   • Number of children: Not on file   • Years of education: Not on file   • Highest education level: Not on file   Tobacco Use   • Smoking status: Former Smoker   • Smokeless tobacco: Never Used   Substance and Sexual Activity   • Alcohol use: Yes   • Drug use: No   • Sexual activity: Defer       Current Outpatient Medications on File Prior to Visit   Medication Sig Dispense Refill   • aspirin 81 MG chewable tablet Chew 1 tablet daily.     • CALCIUM CARBONATE-VITAMIN D PO Take  by mouth.     • Cholecalciferol (VITAMIN D3) 125 MCG (5000 UT) capsule capsule Take 5,000 Units by mouth Daily.     • famotidine (PEPCID) 10 MG tablet Take 10 mg by mouth Daily.     • flecainide (TAMBOCOR) 100 MG tablet Take 100 mg by mouth 2 (Two) Times a Day.     • meloxicam (MOBIC) 15 MG tablet TAKE ONE TABLET BY MOUTH DAILY 90 tablet 0   • metoprolol succinate XL (TOPROL-XL) 25 MG 24 hr tablet TAKE ONE TABLET BY MOUTH DAILY 90 tablet 0   • PREBIOTIC PRODUCT PO Take  by mouth.     • sildenafil (REVATIO) 20 MG tablet TAKE 2 TO 5 TABLETS BY MOUTH 1 HOUR PRIOR TO EVENT 20 tablet 5   • [DISCONTINUED] Omega-3 Fatty Acids (FISH OIL) 1200 MG capsule capsule Take  by mouth.     • [DISCONTINUED] SHINGRIX 50 MCG/0.5ML reconstituted suspension Inject 0.5 mL into the appropriate muscle as directed by prescriber Every 6 (Six) Months for 2 doses. 1 each 1     No current facility-administered medications on file prior to visit.        Review of Systems   Constitutional: Negative for activity change, chills and fever.   Eyes: Negative for blurred vision.   Respiratory: Negative for cough, chest tightness and shortness of breath.    Cardiovascular: Negative for chest pain.   Gastrointestinal: Negative for abdominal pain, nausea and vomiting.   Genitourinary: Negative for decreased urine volume, dysuria and  frequency.   Musculoskeletal: Positive for back pain and neck pain. Negative for arthralgias and myalgias.   Skin: Negative for rash and skin lesions.   Neurological: Negative for dizziness and confusion.   Psychiatric/Behavioral: Negative for agitation, behavioral problems and depressed mood.           Vitals:    02/13/20 1400   BP: 120/80   Pulse: 59   Temp: 97.8 °F (36.6 °C)   SpO2: 99%      Objective   Physical Exam   Constitutional: He is oriented to person, place, and time. He appears well-developed and well-nourished.   HENT:   Head: Normocephalic and atraumatic.   Eyes: Pupils are equal, round, and reactive to light. Conjunctivae and EOM are normal.   Neck: Neck supple.   Cardiovascular: Normal rate, regular rhythm and normal heart sounds.   No murmur heard.  Pulmonary/Chest: Effort normal and breath sounds normal. No respiratory distress.   Abdominal: Soft. Bowel sounds are normal.   Musculoskeletal: Normal range of motion.   Neurological: He is alert and oriented to person, place, and time.   Skin: Skin is warm and dry.   Psychiatric: He has a normal mood and affect.   Nursing note and vitals reviewed.        Assessment/Plan   Problem List Items Addressed This Visit     None      Visit Diagnoses     Neck pain    -  Primary    Relevant Orders    XR Spine Cervical 2 or 3 View    Ambulatory Referral to Physical Therapy Evaluate and treat; Stretching, ROM, Strengthening    Upper back pain        Relevant Orders    XR Spine Cervical 2 or 3 View    Ambulatory Referral to Physical Therapy Evaluate and treat; Stretching, ROM, Strengthening    Degenerative disc disease, cervical        Relevant Orders    XR Spine Cervical 2 or 3 View    Ambulatory Referral to Physical Therapy Evaluate and treat; Stretching, ROM, Strengthening        -Had dx degenerative disc dx cervical area went to PT years ago for this on his left side of neck. Now with pain R side cervical region  -No radicular symptoms         No follow-ups on  file.      Shellie Goodman MD

## 2020-02-14 ENCOUNTER — TELEPHONE (OUTPATIENT)
Dept: FAMILY MEDICINE CLINIC | Facility: CLINIC | Age: 65
End: 2020-02-14

## 2020-02-14 NOTE — TELEPHONE ENCOUNTER
Called pt with xray results.    Per Dr. Goodman,  Can you notify patient- Xray showed some degenerative changes but is otherwise normal. Referral to PT has been placed      Pt has been notified

## 2020-03-04 ENCOUNTER — TREATMENT (OUTPATIENT)
Dept: PHYSICAL THERAPY | Facility: CLINIC | Age: 65
End: 2020-03-04

## 2020-03-04 DIAGNOSIS — M54.9 UPPER BACK PAIN: ICD-10-CM

## 2020-03-04 DIAGNOSIS — M54.2 PAIN, NECK: Primary | ICD-10-CM

## 2020-03-04 DIAGNOSIS — M50.30 DDD (DEGENERATIVE DISC DISEASE), CERVICAL: ICD-10-CM

## 2020-03-04 PROCEDURE — 97110 THERAPEUTIC EXERCISES: CPT | Performed by: PHYSICAL THERAPIST

## 2020-03-04 PROCEDURE — 97161 PT EVAL LOW COMPLEX 20 MIN: CPT | Performed by: PHYSICAL THERAPIST

## 2020-03-04 PROCEDURE — 97140 MANUAL THERAPY 1/> REGIONS: CPT | Performed by: PHYSICAL THERAPIST

## 2020-03-04 NOTE — PROGRESS NOTES
Physical Therapy Initial Evaluation and Plan of Care       Patient: Alfred Vazquez   : 1955  Diagnosis/ICD-10 Code:  Pain, neck [M54.2]  Referring practitioner: Shellie Goodman MD  Date of Initial Visit: 3/4/2020  Today's Date: 3/4/2020  Patient seen for 1 sessions           Subjective Questionnaire: NDI:14%      Subjective Evaluation    History of Present Illness  Onset date: October.  Mechanism of injury: Right sided neck pain.   20 years ago similar, did traction and resolved.  Play of golf wanted to start carrying bag. After about a month, then neck pain.  Xray - degenerative issues.     Subjective comment: No radiating symptoms, no valsalva, dizziness diplopia, or headache.  Pain is mild discomfort all the time.  Sharp with turning.  Ok to play golf. Does have tinnitis before injury.  Precautions and Work Restrictions: nonePain  Current pain ratin  At worst pain ratin  Location: right neck, popping and cracking.  Quality: dull ache and sharp  Relieving factors: change in position  Exacerbated by: stiff with turning neck.  Progression: no change    Hand dominance: right    Diagnostic Tests  X-ray: abnormal (mild degernerative changes C5,6; C6,7)    Treatments  Treatments tried: home traction.  Current treatment: physical therapy  Patient Goals  Patient goals for therapy: decreased pain and increased motion             Objective       Palpation     Right   Hypertonic in the cervical paraspinals, levator scapulae, pectoralis major and upper trapezius. Tenderness of the cervical paraspinals, suboccipitals and upper trapezius.     Cervical Spine Comments  Right cervical paraspinals: C2-5.     Neurological Testing     Sensation   Cervical/Thoracic   Left   Intact: light touch    Right   Intact: light touch    Reflexes   Left   Biceps (C5/C6): normal (2+)  Brachioradialis (C6): normal (2+)  Triceps (C7): normal (2+)    Right   Biceps (C5/C6): normal (2+)  Brachioradialis (C6): normal (2+)  Triceps  (C7): normal (2+)    Active Range of Motion   Cervical/Thoracic Spine   Cervical    Flexion: 45 degrees   Extension: 30 degrees   Left lateral flexion: 32 (left scapular discomfort) degrees   Right lateral flexion: 35 (tight) degrees   Left rotation: 65 degrees with pain  Right rotation: 75 degrees     Strength/Myotome Testing   Cervical Spine   Neck extension: 5  Neck flexion: 5    Left Shoulder     Planes of Motion   Flexion: 5   Abduction: 5     Isolated Muscles   Lower trapezius: 4   Middle trapezius: 4+     Right Shoulder     Planes of Motion   Flexion: 5   Abduction: 5     Isolated Muscles   Lower trapezius: 4   Middle trapezius: 4+     Left Elbow   Flexion: 5  Extension: 5    Right Elbow   Flexion: 5  Extension: 5    Left Wrist/Hand   Wrist extension: 5  Wrist flexion: 5    Right Wrist/Hand   Wrist extension: 5  Wrist flexion: 5    Additional Strength Details  Rhomboid 5/5  Cervical musculature 5/5 no pain    Tests   Cervical     Left   Negative alar ligament integrity, active compression ([place]), cervical distraction, Spurling's sign and transverse ligament.     Right   Negative alar ligament integrity, active compression ([place]), cervical distraction, Spurling's sign and transverse ligament.     Additional Tests Details  (-) vertebral artery         Assessment & Plan     Assessment  Impairments: abnormal muscle tone, abnormal or restricted ROM, activity intolerance, lacks appropriate home exercise program and pain with function  Assessment details: Patient is a 65 yo male that began having cervical pain on the right side of his neck ~ 5 months ago.  He does not have any radicular symptoms.  He has mild  tenderness at the paraspinals and levator scapula.  ROM is limited with right sidebending and rotation, strength is decreased in the right scapula, and function is limited with carrying heavy items.  Based on the above findings the patient is a good candidate for therapy.  Prognosis: good  Prognosis  details:      Functional Limitations: lifting, sleeping, pulling, pushing, uncomfortable because of pain and sitting  Goals  Plan Goals: 2 weeks  1.  Patient to tolerate initial exercises without increase in pain.  2.  Increase cervical right rotation ROM by 5 degrees.  3.  Decrease frequency of symptoms by 25%.  4. Sleep and turn over without pain.  4 weeks  1.  Patient to be independent with HEP  2.  Increase cervical right SB and rotation ROM to WNL and no pain.  3.  Decrease frequency of symptoms by 75%.  4.  NDI improved 10%  5. Resume golf and all other activities. with tolerable symptoms.        Plan  Therapy options: will be seen for skilled physical therapy services  Planned modality interventions: cryotherapy, ultrasound, traction and thermotherapy (hydrocollator packs)  Planned therapy interventions: manual therapy, neuromuscular re-education, functional ROM exercises, home exercise program, stretching, strengthening and ADL retraining  Frequency: 2x week  Duration in weeks: 8  Treatment plan discussed with: patient  Plan details: 3-4 weeks        Manual Therapy:    10     mins  35257;  Therapeutic Exercise:    10     mins  58355;     Neuromuscular Geovanna:    -    mins  81419;    Therapeutic Activity:     -     mins  90100;     Gait Training:      -     mins  54834;     Ultrasound:     8     mins  57460;    Electrical Stimulation:    -     mins  63246 ( );  Dry Needling     -     mins self-pay    Timed Treatment:   28   mins   Total Treatment:     55   mins    PT SIGNATURE: Sun Sweet, PT   DATE TREATMENT INITIATED: 3/4/2020    Initial Certification  Certification Period: 6/2/2020  I certify that the therapy services are furnished while this patient is under my care.  The services outlined above are required by this patient, and will be reviewed every 90 days.     PHYSICIAN: Shellie Goodman MD      DATE:     Please sign and return via fax to [phone].. Thank you, [facility]rapy.

## 2020-03-09 ENCOUNTER — TREATMENT (OUTPATIENT)
Dept: PHYSICAL THERAPY | Facility: CLINIC | Age: 65
End: 2020-03-09

## 2020-03-09 DIAGNOSIS — M54.9 UPPER BACK PAIN: ICD-10-CM

## 2020-03-09 DIAGNOSIS — M54.2 PAIN, NECK: Primary | ICD-10-CM

## 2020-03-09 DIAGNOSIS — M50.30 DDD (DEGENERATIVE DISC DISEASE), CERVICAL: ICD-10-CM

## 2020-03-09 PROCEDURE — 97110 THERAPEUTIC EXERCISES: CPT | Performed by: PHYSICAL THERAPIST

## 2020-03-09 PROCEDURE — 97140 MANUAL THERAPY 1/> REGIONS: CPT | Performed by: PHYSICAL THERAPIST

## 2020-03-09 PROCEDURE — 97012 MECHANICAL TRACTION THERAPY: CPT | Performed by: PHYSICAL THERAPIST

## 2020-03-09 NOTE — PROGRESS NOTES
Physical Therapy Daily Progress Note        Patient: Alfred Vazquez   : 1955  Diagnosis/ICD-10 Code:  Pain, neck [M54.2]  Referring practitioner: Shellie Goodman MD  Date of Initial Visit: Type: THERAPY  Noted: 3/4/2020  Today's Date: 3/9/2020  Patient seen for 2 sessions         Alfred Vazquez reports: his neck feels a little bit better after stretching all week.         Subjective     Objective       Palpation     Right   Hypertonic in the cervical paraspinals and upper trapezius.      See Exercise, Manual, and Modality Logs for complete treatment.       Assessment/Plan  Continued tightness noted in (R) upper trap thus performed STM for relief.  Pt also added scapular strengthening and gentle cervical ROM exercises without complaints. Increased traction time this date for greater relief.  Discussed use of home traction in ~2 days if symptoms do not increase from traction this date but not to go over 15#.  Pt verbalized understanding.  Will update HEP next session and progress exercises if tolerated.     Progress per Plan of Care           Manual Therapy:    8     mins  90401;  Therapeutic Exercise:    20     mins  75214;     Neuromuscular Geovanna:        mins  54525;    Therapeutic Activity:          mins  21808;     Gait Training:           mins  02496;     Ultrasound:          mins  08727;    Electrical Stimulation:         mins  51938 ( );  Dry Needling          mins self-pay  Traction  15 mins    Timed Treatment:   28   mins   Total Treatment:     44   mins    Reina Mane PTA  Physical Therapist Assistant

## 2020-03-12 ENCOUNTER — TREATMENT (OUTPATIENT)
Dept: PHYSICAL THERAPY | Facility: CLINIC | Age: 65
End: 2020-03-12

## 2020-03-12 DIAGNOSIS — M54.9 UPPER BACK PAIN: ICD-10-CM

## 2020-03-12 DIAGNOSIS — M54.2 PAIN, NECK: Primary | ICD-10-CM

## 2020-03-12 DIAGNOSIS — M50.30 DDD (DEGENERATIVE DISC DISEASE), CERVICAL: ICD-10-CM

## 2020-03-12 PROCEDURE — 97110 THERAPEUTIC EXERCISES: CPT | Performed by: PHYSICAL THERAPIST

## 2020-03-12 PROCEDURE — 97140 MANUAL THERAPY 1/> REGIONS: CPT | Performed by: PHYSICAL THERAPIST

## 2020-03-12 PROCEDURE — 97012 MECHANICAL TRACTION THERAPY: CPT | Performed by: PHYSICAL THERAPIST

## 2020-03-12 NOTE — PROGRESS NOTES
Physical Therapy Daily Progress Note        Patient: Alfred Vazquez   : 1955  Diagnosis/ICD-10 Code:  Pain, neck [M54.2]  Referring practitioner: Shellie Goodman MD  Date of Initial Visit: Type: THERAPY  Noted: 3/4/2020  Today's Date: 3/12/2020  Patient seen for 3 sessions         Alfred Vazquez reports: no improvement after last session.  He thinks it was a little more sore yesterday after digging in there last time.  He said he didn't get a chance to do the traction at home but did some stretching.         Subjective     Objective       Palpation     Right Tenderness of the cervical paraspinals and upper trapezius.   Trigger point to upper trapezius.      See Exercise, Manual, and Modality Logs for complete treatment.       Assessment/Plan  Continued tightness and tenderness in (R) UT.  Reintroduced US this date in combination with STM and traction to assist with pain control.  Progressed time on traction for increased relief from use.  Will continue to monitor tolerance to exercises and modalities and progress exercises when appropriate based on symptoms.     Progress per Plan of Care           Manual Therapy:    8     mins  49597;  Therapeutic Exercise:    15     mins  56542;     Neuromuscular Geovanna:        mins  51084;    Therapeutic Activity:          mins  04676;     Gait Training:           mins  76207;     Ultrasound:          mins  06291;    Electrical Stimulation:         mins  62023 ( );  Dry Needling          mins self-pay  Traction  15 mins     Timed Treatment:   23   mins   Total Treatment:     62   mins    Reina Mane PTA  Physical Therapist Assistant

## 2020-03-16 ENCOUNTER — TREATMENT (OUTPATIENT)
Dept: PHYSICAL THERAPY | Facility: CLINIC | Age: 65
End: 2020-03-16

## 2020-03-16 DIAGNOSIS — M54.9 UPPER BACK PAIN: ICD-10-CM

## 2020-03-16 DIAGNOSIS — M54.2 PAIN, NECK: Primary | ICD-10-CM

## 2020-03-16 DIAGNOSIS — M50.30 DDD (DEGENERATIVE DISC DISEASE), CERVICAL: ICD-10-CM

## 2020-03-16 PROCEDURE — 97012 MECHANICAL TRACTION THERAPY: CPT | Performed by: PHYSICAL THERAPIST

## 2020-03-16 PROCEDURE — 97110 THERAPEUTIC EXERCISES: CPT | Performed by: PHYSICAL THERAPIST

## 2020-03-16 NOTE — PROGRESS NOTES
Physical Therapy Daily Progress Note        Patient: Alfred Vazquez   : 1955  Diagnosis/ICD-10 Code:  Pain, neck [M54.2]  Referring practitioner: Shellie Goodman MD  Date of Initial Visit: Type: THERAPY  Noted: 3/4/2020  Today's Date: 3/16/2020  Patient seen for 4 sessions         Alfred Vazquez reports: his neck feels pretty good right now but sometimes if he turns wrong it hurts.         Subjective     Objective       Palpation     Right   Hypertonic in the levator scapulae and upper trapezius. Tenderness of the levator scapulae and upper trapezius.      See Exercise, Manual, and Modality Logs for complete treatment.       Assessment/Plan  Pt with continued tightness to (R) upper trap/levator however decreased overall tightness in (R) upper trap.  He tolerated exercises well and was able to reintroduce rows and shoulder ext for scapular strengthening.  Increased weight/pull on traction this date without complaints.  Will continue to monitor symptoms and progress as tolerated to decrease stress/overuse of upper trap for decreased pain.     Progress per Plan of Care           Manual Therapy:         mins  20218;  Therapeutic Exercise:    25     mins  36368;     Neuromuscular Geovanna:        mins  97575;    Therapeutic Activity:          mins  62744;     Gait Training:           mins  43531;     Ultrasound:          mins  26230;    Electrical Stimulation:         mins  33009 ( );  Dry Needling          mins self-pay  Traction  15 mins     Timed Treatment:   25   mins   Total Treatment:     57   mins    Reina Mane PTA  Physical Therapist Assistant

## 2020-03-19 ENCOUNTER — TREATMENT (OUTPATIENT)
Dept: PHYSICAL THERAPY | Facility: CLINIC | Age: 65
End: 2020-03-19

## 2020-03-19 DIAGNOSIS — M54.9 UPPER BACK PAIN: ICD-10-CM

## 2020-03-19 DIAGNOSIS — M54.2 PAIN, NECK: Primary | ICD-10-CM

## 2020-03-19 DIAGNOSIS — M50.30 DDD (DEGENERATIVE DISC DISEASE), CERVICAL: ICD-10-CM

## 2020-03-19 PROCEDURE — 97110 THERAPEUTIC EXERCISES: CPT | Performed by: PHYSICAL THERAPIST

## 2020-03-19 PROCEDURE — 97140 MANUAL THERAPY 1/> REGIONS: CPT | Performed by: PHYSICAL THERAPIST

## 2020-03-19 PROCEDURE — G0283 ELEC STIM OTHER THAN WOUND: HCPCS | Performed by: PHYSICAL THERAPIST

## 2020-03-19 NOTE — PROGRESS NOTES
Physical Therapy Daily Progress Note        Patient: Alfred Vazquez   : 1955  Diagnosis/ICD-10 Code:  Pain, neck [M54.2]  Referring practitioner: Shellie Goodman MD  Date of Initial Visit: Type: THERAPY  Noted: 3/4/2020  Today's Date: 3/19/2020  Patient seen for 5 sessions         Alfred Vazquez reports: he thinks it is improving.  He said he tried to play basketball with his grandson the other day and it really bothered him to shoot.         Subjective     Objective       Palpation     Right Tenderness of the upper trapezius.   Trigger point to upper trapezius.      See Exercise, Manual, and Modality Logs for complete treatment.       Assessment/Plan  Pt tolerated exercises without complaints including addition of resisted walls slides to improve scapular strength and stability with overhead activities.  Decreased tightness and tenderness in UT and paraspinals however continued to present with trigger point and tenderness as noted.  Issued and reviewed HEP this date.  Pt able to perform exercises with minimal cueing.  Will continue with HEP at this time due to pandemic however may return to PT when appropriate for continued progress towards goals and decreased pain with ADLs.     Progress per Plan of Care           Manual Therapy:    8     mins  65540;  Therapeutic Exercise:    35     mins  93463;     Neuromuscular Geovanna:        mins  65858;    Therapeutic Activity:          mins  30426;     Gait Training:           mins  62416;     Ultrasound:          mins  94110;    Electrical Stimulation:    15     mins  58094 ( );  Dry Needling          mins self-pay    Timed Treatment:   43   mins   Total Treatment:     58   mins    Reina Mane PTA  Physical Therapist Assistant

## 2020-03-20 ENCOUNTER — DOCUMENTATION (OUTPATIENT)
Dept: PHYSICAL THERAPY | Facility: CLINIC | Age: 65
End: 2020-03-20

## 2020-03-20 NOTE — PROGRESS NOTES
Called pt regarding discontinued therapy at this time due to pandemic.  He stated no questions or concerns with exercises and said he did them this morning without problems.  He said he thinks he will be ok.  Discussed possibility of resuming therapy after April 6 or with telehealth but pt did not think he would need it.

## 2020-03-26 RX ORDER — MELOXICAM 15 MG/1
TABLET ORAL
Qty: 90 TABLET | Refills: 0 | Status: SHIPPED | OUTPATIENT
Start: 2020-03-26 | End: 2020-06-26

## 2020-05-12 DIAGNOSIS — Z23 IMMUNIZATION DUE: ICD-10-CM

## 2020-05-12 DIAGNOSIS — I48.91 ATRIAL FIBRILLATION WITH RAPID VENTRICULAR RESPONSE (HCC): ICD-10-CM

## 2020-05-12 DIAGNOSIS — E55.9 VITAMIN D DEFICIENCY: ICD-10-CM

## 2020-05-12 DIAGNOSIS — R73.02 GLUCOSE INTOLERANCE (IMPAIRED GLUCOSE TOLERANCE): ICD-10-CM

## 2020-05-12 DIAGNOSIS — Z00.00 MEDICARE ANNUAL WELLNESS VISIT, SUBSEQUENT: ICD-10-CM

## 2020-05-12 DIAGNOSIS — E78.2 MIXED HYPERLIPIDEMIA: ICD-10-CM

## 2020-05-12 DIAGNOSIS — K21.9 GASTROESOPHAGEAL REFLUX DISEASE WITHOUT ESOPHAGITIS: ICD-10-CM

## 2020-05-12 DIAGNOSIS — I71.40 ABDOMINAL AORTIC ANEURYSM (AAA) WITHOUT RUPTURE (HCC): ICD-10-CM

## 2020-05-14 LAB
25(OH)D3+25(OH)D2 SERPL-MCNC: 56.2 NG/ML (ref 30–100)
ALBUMIN SERPL-MCNC: 4.2 G/DL (ref 3.5–5.2)
ALBUMIN/GLOB SERPL: 2 G/DL
ALP SERPL-CCNC: 52 U/L (ref 39–117)
ALT SERPL-CCNC: 23 U/L (ref 1–41)
AST SERPL-CCNC: 17 U/L (ref 1–40)
BASOPHILS # BLD AUTO: 0.03 10*3/MM3 (ref 0–0.2)
BASOPHILS NFR BLD AUTO: 0.7 % (ref 0–1.5)
BILIRUB SERPL-MCNC: 0.6 MG/DL (ref 0.2–1.2)
BUN SERPL-MCNC: 30 MG/DL (ref 8–23)
BUN/CREAT SERPL: 31.3 (ref 7–25)
CALCIUM SERPL-MCNC: 9.4 MG/DL (ref 8.6–10.5)
CHLORIDE SERPL-SCNC: 103 MMOL/L (ref 98–107)
CHOLEST SERPL-MCNC: 192 MG/DL (ref 0–200)
CO2 SERPL-SCNC: 30 MMOL/L (ref 22–29)
CREAT SERPL-MCNC: 0.96 MG/DL (ref 0.76–1.27)
EOSINOPHIL # BLD AUTO: 0.2 10*3/MM3 (ref 0–0.4)
EOSINOPHIL NFR BLD AUTO: 4.6 % (ref 0.3–6.2)
ERYTHROCYTE [DISTWIDTH] IN BLOOD BY AUTOMATED COUNT: 12.4 % (ref 12.3–15.4)
GLOBULIN SER CALC-MCNC: 2.1 GM/DL
GLUCOSE SERPL-MCNC: 102 MG/DL (ref 65–99)
HBA1C MFR BLD: 6.1 % (ref 4.8–5.6)
HCT VFR BLD AUTO: 45.5 % (ref 37.5–51)
HDLC SERPL-MCNC: 59 MG/DL (ref 40–60)
HGB BLD-MCNC: 15.2 G/DL (ref 13–17.7)
IMM GRANULOCYTES # BLD AUTO: 0.01 10*3/MM3 (ref 0–0.05)
IMM GRANULOCYTES NFR BLD AUTO: 0.2 % (ref 0–0.5)
LDLC SERPL CALC-MCNC: 108 MG/DL (ref 0–100)
LYMPHOCYTES # BLD AUTO: 1.31 10*3/MM3 (ref 0.7–3.1)
LYMPHOCYTES NFR BLD AUTO: 30.4 % (ref 19.6–45.3)
MCH RBC QN AUTO: 32.1 PG (ref 26.6–33)
MCHC RBC AUTO-ENTMCNC: 33.4 G/DL (ref 31.5–35.7)
MCV RBC AUTO: 96.2 FL (ref 79–97)
MONOCYTES # BLD AUTO: 0.5 10*3/MM3 (ref 0.1–0.9)
MONOCYTES NFR BLD AUTO: 11.6 % (ref 5–12)
NEUTROPHILS # BLD AUTO: 2.26 10*3/MM3 (ref 1.7–7)
NEUTROPHILS NFR BLD AUTO: 52.5 % (ref 42.7–76)
NRBC BLD AUTO-RTO: 0 /100 WBC (ref 0–0.2)
PLATELET # BLD AUTO: 186 10*3/MM3 (ref 140–450)
POTASSIUM SERPL-SCNC: 4.3 MMOL/L (ref 3.5–5.2)
PROT SERPL-MCNC: 6.3 G/DL (ref 6–8.5)
RBC # BLD AUTO: 4.73 10*6/MM3 (ref 4.14–5.8)
SODIUM SERPL-SCNC: 141 MMOL/L (ref 136–145)
TRIGL SERPL-MCNC: 127 MG/DL (ref 0–150)
VLDLC SERPL CALC-MCNC: 25.4 MG/DL
WBC # BLD AUTO: 4.31 10*3/MM3 (ref 3.4–10.8)

## 2020-05-28 RX ORDER — METOPROLOL SUCCINATE 25 MG/1
TABLET, EXTENDED RELEASE ORAL
Qty: 90 TABLET | Refills: 0 | Status: SHIPPED | OUTPATIENT
Start: 2020-05-28 | End: 2020-11-16 | Stop reason: SDUPTHER

## 2020-06-26 RX ORDER — MELOXICAM 15 MG/1
TABLET ORAL
Qty: 90 TABLET | Refills: 0 | Status: SHIPPED | OUTPATIENT
Start: 2020-06-26 | End: 2020-09-25

## 2020-09-10 ENCOUNTER — TELEPHONE (OUTPATIENT)
Dept: FAMILY MEDICINE CLINIC | Facility: CLINIC | Age: 65
End: 2020-09-10

## 2020-09-10 NOTE — TELEPHONE ENCOUNTER
PATIENT CALLED AND NEEDS ADVICE. HIS DAUGHTER IN LAW WAS TESTED POSITIVE TODAY. THEY HAVE NOT BEEN AROUND HER BUT THEIR  SON AND GRANDSON CAME OVER ON Saturday AND Sunday. THEY WERE NOT SHOWING ANY SYMPTOMS.   PATIET IS ASKING WHAT SHOULD HE DO.HE IS NOT SHOWING ANY SYMPTOMS.    PLEASE CALL [phone]

## 2020-09-10 NOTE — TELEPHONE ENCOUNTER
I would not have them get tested yet.  Let them know what the symptoms are- fever, cough, shortness of air, loss of taste, loss of smell.  If any combination of these develop and they should go get tested- you can tell them where we can accomplish that.

## 2020-09-25 RX ORDER — MELOXICAM 15 MG/1
TABLET ORAL
Qty: 90 TABLET | Refills: 0 | Status: SHIPPED | OUTPATIENT
Start: 2020-09-25 | End: 2020-12-28

## 2020-10-01 ENCOUNTER — FLU SHOT (OUTPATIENT)
Dept: FAMILY MEDICINE CLINIC | Facility: CLINIC | Age: 65
End: 2020-10-01

## 2020-10-01 DIAGNOSIS — Z23 NEED FOR INFLUENZA VACCINATION: ICD-10-CM

## 2020-10-01 PROCEDURE — 90694 VACC AIIV4 NO PRSRV 0.5ML IM: CPT | Performed by: FAMILY MEDICINE

## 2020-10-01 PROCEDURE — G0008 ADMIN INFLUENZA VIRUS VAC: HCPCS | Performed by: FAMILY MEDICINE

## 2020-11-04 DIAGNOSIS — E78.2 MIXED HYPERLIPIDEMIA: Primary | ICD-10-CM

## 2020-11-04 DIAGNOSIS — R73.02 GLUCOSE INTOLERANCE (IMPAIRED GLUCOSE TOLERANCE): ICD-10-CM

## 2020-11-04 DIAGNOSIS — E55.9 VITAMIN D DEFICIENCY: ICD-10-CM

## 2020-11-04 DIAGNOSIS — Z12.5 SPECIAL SCREENING FOR MALIGNANT NEOPLASM OF PROSTATE: ICD-10-CM

## 2020-11-06 LAB
25(OH)D3+25(OH)D2 SERPL-MCNC: 67.6 NG/ML (ref 30–100)
ALBUMIN SERPL-MCNC: 4.3 G/DL (ref 3.5–5.2)
ALBUMIN/GLOB SERPL: 2.5 G/DL
ALP SERPL-CCNC: 56 U/L (ref 39–117)
ALT SERPL-CCNC: 22 U/L (ref 1–41)
AST SERPL-CCNC: 25 U/L (ref 1–40)
BASOPHILS # BLD AUTO: 0.04 10*3/MM3 (ref 0–0.2)
BASOPHILS NFR BLD AUTO: 1.2 % (ref 0–1.5)
BILIRUB SERPL-MCNC: 0.6 MG/DL (ref 0–1.2)
BUN SERPL-MCNC: 32 MG/DL (ref 8–23)
BUN/CREAT SERPL: 34.8 (ref 7–25)
CALCIUM SERPL-MCNC: 9 MG/DL (ref 8.6–10.5)
CHLORIDE SERPL-SCNC: 100 MMOL/L (ref 98–107)
CHOLEST SERPL-MCNC: 195 MG/DL (ref 0–200)
CO2 SERPL-SCNC: 29 MMOL/L (ref 22–29)
CREAT SERPL-MCNC: 0.92 MG/DL (ref 0.76–1.27)
EOSINOPHIL # BLD AUTO: 0.14 10*3/MM3 (ref 0–0.4)
EOSINOPHIL NFR BLD AUTO: 4.4 % (ref 0.3–6.2)
ERYTHROCYTE [DISTWIDTH] IN BLOOD BY AUTOMATED COUNT: 12.2 % (ref 12.3–15.4)
GLOBULIN SER CALC-MCNC: 1.7 GM/DL
GLUCOSE SERPL-MCNC: 100 MG/DL (ref 65–99)
HBA1C MFR BLD: 5.93 % (ref 4.8–5.6)
HCT VFR BLD AUTO: 43.2 % (ref 37.5–51)
HDLC SERPL-MCNC: 64 MG/DL (ref 40–60)
HGB BLD-MCNC: 14.8 G/DL (ref 13–17.7)
IMM GRANULOCYTES # BLD AUTO: 0.01 10*3/MM3 (ref 0–0.05)
IMM GRANULOCYTES NFR BLD AUTO: 0.3 % (ref 0–0.5)
LDLC SERPL CALC-MCNC: 112 MG/DL (ref 0–100)
LYMPHOCYTES # BLD AUTO: 1 10*3/MM3 (ref 0.7–3.1)
LYMPHOCYTES NFR BLD AUTO: 31.2 % (ref 19.6–45.3)
MCH RBC QN AUTO: 32.4 PG (ref 26.6–33)
MCHC RBC AUTO-ENTMCNC: 34.3 G/DL (ref 31.5–35.7)
MCV RBC AUTO: 94.5 FL (ref 79–97)
MONOCYTES # BLD AUTO: 0.43 10*3/MM3 (ref 0.1–0.9)
MONOCYTES NFR BLD AUTO: 13.4 % (ref 5–12)
NEUTROPHILS # BLD AUTO: 1.59 10*3/MM3 (ref 1.7–7)
NEUTROPHILS NFR BLD AUTO: 49.5 % (ref 42.7–76)
NRBC BLD AUTO-RTO: 0 /100 WBC (ref 0–0.2)
PLATELET # BLD AUTO: 189 10*3/MM3 (ref 140–450)
POTASSIUM SERPL-SCNC: 4.4 MMOL/L (ref 3.5–5.2)
PROT SERPL-MCNC: 6 G/DL (ref 6–8.5)
PSA SERPL-MCNC: 0.32 NG/ML (ref 0–4)
RBC # BLD AUTO: 4.57 10*6/MM3 (ref 4.14–5.8)
SODIUM SERPL-SCNC: 136 MMOL/L (ref 136–145)
TRIGL SERPL-MCNC: 106 MG/DL (ref 0–150)
VLDLC SERPL CALC-MCNC: 19 MG/DL (ref 5–40)
WBC # BLD AUTO: 3.21 10*3/MM3 (ref 3.4–10.8)

## 2020-11-16 ENCOUNTER — OFFICE VISIT (OUTPATIENT)
Dept: FAMILY MEDICINE CLINIC | Facility: CLINIC | Age: 65
End: 2020-11-16

## 2020-11-16 VITALS
SYSTOLIC BLOOD PRESSURE: 120 MMHG | HEART RATE: 64 BPM | WEIGHT: 207 LBS | DIASTOLIC BLOOD PRESSURE: 84 MMHG | TEMPERATURE: 98.4 F | OXYGEN SATURATION: 99 % | BODY MASS INDEX: 28.04 KG/M2 | HEIGHT: 72 IN

## 2020-11-16 DIAGNOSIS — I48.91 ATRIAL FIBRILLATION WITH RAPID VENTRICULAR RESPONSE (HCC): ICD-10-CM

## 2020-11-16 DIAGNOSIS — M25.559 HIP PAIN: ICD-10-CM

## 2020-11-16 DIAGNOSIS — K21.9 GASTROESOPHAGEAL REFLUX DISEASE WITHOUT ESOPHAGITIS: ICD-10-CM

## 2020-11-16 DIAGNOSIS — E55.9 VITAMIN D DEFICIENCY: ICD-10-CM

## 2020-11-16 DIAGNOSIS — I71.40 ABDOMINAL AORTIC ANEURYSM (AAA) WITHOUT RUPTURE (HCC): ICD-10-CM

## 2020-11-16 DIAGNOSIS — Z00.00 MEDICARE ANNUAL WELLNESS VISIT, SUBSEQUENT: Primary | ICD-10-CM

## 2020-11-16 DIAGNOSIS — E78.2 MIXED HYPERLIPIDEMIA: ICD-10-CM

## 2020-11-16 DIAGNOSIS — R73.02 GLUCOSE INTOLERANCE (IMPAIRED GLUCOSE TOLERANCE): ICD-10-CM

## 2020-11-16 PROCEDURE — G0439 PPPS, SUBSEQ VISIT: HCPCS | Performed by: FAMILY MEDICINE

## 2020-11-16 PROCEDURE — 99214 OFFICE O/P EST MOD 30 MIN: CPT | Performed by: FAMILY MEDICINE

## 2020-11-16 RX ORDER — METOPROLOL SUCCINATE 25 MG/1
25 TABLET, EXTENDED RELEASE ORAL DAILY
Qty: 90 TABLET | Refills: 1 | Status: SHIPPED | OUTPATIENT
Start: 2020-11-16 | End: 2021-05-24 | Stop reason: SDUPTHER

## 2020-11-16 NOTE — PROGRESS NOTES
QUICK REFERENCE INFORMATION:  The ABCs of Providing the Annual Wellness Visit   CMS.[employer] Subsequent Wellness Visit      Subjective   History of Present Illness    Alfred Vazquez is a 65 y.o. male who presents for an Subsequent Wellness Visit. In addition, we addressed the following health issues: 65-year-old white male here for subsequent wellness visit with Medicare as well as follow-up in regards to glucose intolerance, vitamin D deficiency as well as GERD and paroxysmal atrial fibrillation.  Current medications include 81 mg aspirin daily, flecainide, Toprol-XL as well as the use of meloxicam on a as needed basis.  He also uses famotidine and over-the-counter vitamin D3.  Fasting labs have been acquired prior to this visit.  He is still playing golf almost on a daily basis.  He does complain of right hip pain I to the extent of the right knee.  When he plays golf he often walks and would rarely ride a cart.  There has been no other initiating event and there has been no trauma.      PMH, PSH, SocHx, FamHx, Allergies, and Medications: Reviewed and updated in the Visit Navigator.     Outpatient Medications Prior to Visit   Medication Sig Dispense Refill   • aspirin 81 MG chewable tablet Chew 1 tablet daily.     • CALCIUM CARBONATE-VITAMIN D PO Take  by mouth.     • Cholecalciferol (VITAMIN D3) 125 MCG (5000 UT) capsule capsule Take 5,000 Units by mouth Daily.     • famotidine (PEPCID) 10 MG tablet Take 10 mg by mouth Daily.     • flecainide (TAMBOCOR) 100 MG tablet Take 100 mg by mouth 2 (Two) Times a Day.     • meloxicam (MOBIC) 15 MG tablet TAKE ONE TABLET BY MOUTH DAILY 90 tablet 0   • PREBIOTIC PRODUCT PO Take  by mouth.     • sildenafil (REVATIO) 20 MG tablet TAKE 2 TO 5 TABLETS BY MOUTH 1 HOUR PRIOR TO EVENT 20 tablet 5   • metoprolol succinate XL (TOPROL-XL) 25 MG 24 hr tablet TAKE ONE TABLET BY MOUTH DAILY 90 tablet 0     No facility-administered medications prior to visit.         Patient Active Problem List   Diagnosis   • Atrial fibrillation with rapid ventricular response (CMS/HCC)   • Degeneration of intervertebral disc of lumbar region   • Gastroesophageal reflux disease   • Mixed hyperlipidemia   • Insomnia   • Osteoarthritis of multiple joints   • Seasonal allergic rhinitis   • Erectile dysfunction   • Glucose intolerance (impaired glucose tolerance)   • Onychomycosis of right great toe   • Benign nodular prostatic hyperplasia without lower urinary tract symptoms   • Abdominal aortic aneurysm (CMS/HCC)   • Vitamin D deficiency       Health Habits:  Dental Exam. up to date  Eye Exam. up to date  Exercise: 7 times/week.  Current exercise activities include: walking    Social:  See review in SnapShot activity and in SocHx section of Visit Navigator.    Health Risk Assessment:  The patient has completed a Health Risk Assessment. This has been reviewed with them and has been scanned into OnBase as a separate document.    Current Medical Providers:  Patient Care Team:  Julio Watson DO as PCP - General    The [facility] providers who are involved in the care of this patient are listed above. Additional providers and suppliers are listed below:    Recent Hospitalizations:  No recent hospitalization(s)..    Age-appropriate Screening Schedule:  Refer to the list below for future screening recommendations based on patient's age. Orders for these recommended tests are listed in the plan section. The patient has been provided with a written plan.    Health Maintenance   Topic Date Due   • ZOSTER VACCINE (2 of 3) 01/07/2014   • HEPATITIS C SCREENING  02/22/2016   • ANNUAL WELLNESS VISIT  11/13/2020   • DIABETIC FOOT EXAM  11/18/2030 (Originally 11/13/2020)   • DIABETIC EYE EXAM  11/18/2030 (Originally 9/10/2020)   • URINE MICROALBUMIN  11/18/2030 (Originally 11/13/2020)   • HEMOGLOBIN A1C  05/05/2021   • LIPID PANEL  11/05/2021   • Pneumococcal Vaccine 65+ (2 of 2 - PPSV23) 11/02/2022    • TDAP/TD VACCINES (2 - Td) 11/13/2022   • COLONOSCOPY  03/25/2024   • INFLUENZA VACCINE  Completed   • AAA SCREEN (ONE-TIME)  Completed       Depression Screen:   PHQ-2/PHQ-9 Depression Screening 11/16/2020   Little interest or pleasure in doing things 0   Feeling down, depressed, or hopeless 0   Total Score 0       Functional and Cognitive Screening:  Functional & Cognitive Status 11/16/2020   Do you have difficulty preparing food and eating? No   Do you have difficulty bathing yourself, getting dressed or grooming yourself? No   Do you have difficulty using the toilet? No   Do you have difficulty moving around from place to place? No   Do you have trouble with steps or getting out of a bed or a chair? No   Current Diet Low Carb Diet   Dental Exam Up to date   Eye Exam Up to date   Exercise (times per week) 7 times per week   Current Exercise Activities Include Walking   Do you need help using the phone?  No   Are you deaf or do you have serious difficulty hearing?  Yes   Do you need help with transportation? No   Do you need help shopping? No   Do you need help preparing meals?  No   Do you need help with housework?  No   Do you need help with laundry? No   Do you need help taking your medications? No   Do you need help managing money? No   Do you ever drive or ride in a car without wearing a seat belt? No   Have you felt unusual stress, anger or loneliness in the last month? No   Who do you live with? Spouse   If you need help, do you have trouble finding someone available to you? No   Have you been bothered in the last four weeks by sexual problems? -   Do you have difficulty concentrating, remembering or making decisions? No       Does the patient have evidence of cognitive impairment? No    Identification of Risk Factors:  Risk factors include: Advance Directive Discussion  Cardiovascular risk  Colon Cancer Screening  Dementia/Memory   Depression/Dysphoria  Diabetic Lab Screening   Fall Risk  Hearing  "Problem  Immunizations Discussed/Encouraged (specific immunizations; Shingrix )  Prostate Cancer Screening .    Review of Systems   Cardiovascular:        Paroxysmal atrial fibrillation   Gastrointestinal:        GERD   Genitourinary:        Erectile dysfunction   Musculoskeletal: Positive for arthralgias.   All other systems reviewed and are negative.      Pertinent items are noted in HPI.    Objective     Vitals:    11/16/20 1453   BP: 120/84   Pulse: 64   Temp: 98.4 °F (36.9 °C)   SpO2: 99%   Weight: 93.9 kg (207 lb)   Height: 182.9 cm (72\")   Physical Exam  Vitals signs and nursing note reviewed.   Constitutional:       Appearance: Normal appearance. He is well-developed, well-groomed and overweight.   HENT:      Head: Normocephalic and atraumatic.   Neck:      Musculoskeletal: Neck supple.      Thyroid: No thyroid mass or thyromegaly.      Vascular: Normal carotid pulses. No carotid bruit.      Trachea: Trachea and phonation normal.   Cardiovascular:      Rate and Rhythm: Normal rate and regular rhythm.      Heart sounds: Normal heart sounds. No murmur. No friction rub. No gallop.    Pulmonary:      Effort: Pulmonary effort is normal. No respiratory distress.      Breath sounds: Normal breath sounds. No decreased breath sounds, wheezing, rhonchi or rales.   Musculoskeletal:      Comments: Right hip with active full range of motion.  Motor 5/5 and neurovascular intact distally.  No evidence of tenderness with palpation in the lateral portion of the femur and right hip joint.  Straight leg raising is negative at 60 degrees bilaterally.  Joe is full with increased tenderness in both internal and external rotation on the right only.   Lymphadenopathy:      Cervical: No cervical adenopathy.   Skin:     General: Skin is warm and dry.      Findings: No rash.   Neurological:      Mental Status: He is alert and oriented to person, place, and time.      Sensory: Sensation is intact.      Motor: Motor function is " intact.      Gait: Gait is intact.   Psychiatric:         Attention and Perception: Attention and perception normal.         Mood and Affect: Mood and affect normal.         Speech: Speech normal.         Behavior: Behavior normal. Behavior is cooperative.         Thought Content: Thought content normal.         Cognition and Memory: Cognition and memory normal.         Judgment: Judgment normal.       Orders Only on 11/04/2020   Component Date Value Ref Range Status   • WBC 11/05/2020 3.21* 3.40 - 10.80 10*3/mm3 Final   • RBC 11/05/2020 4.57  4.14 - 5.80 10*6/mm3 Final   • Hemoglobin 11/05/2020 14.8  13.0 - 17.7 g/dL Final   • Hematocrit 11/05/2020 43.2  37.5 - 51.0 % Final   • MCV 11/05/2020 94.5  79.0 - 97.0 fL Final   • MCH 11/05/2020 32.4  26.6 - 33.0 pg Final   • MCHC 11/05/2020 34.3  31.5 - 35.7 g/dL Final   • RDW 11/05/2020 12.2* 12.3 - 15.4 % Final   • Platelets 11/05/2020 189  140 - 450 10*3/mm3 Final   • Neutrophil Rel % 11/05/2020 49.5  42.7 - 76.0 % Final   • Lymphocyte Rel % 11/05/2020 31.2  19.6 - 45.3 % Final   • Monocyte Rel % 11/05/2020 13.4* 5.0 - 12.0 % Final   • Eosinophil Rel % 11/05/2020 4.4  0.3 - 6.2 % Final   • Basophil Rel % 11/05/2020 1.2  0.0 - 1.5 % Final   • Neutrophils Absolute 11/05/2020 1.59* 1.70 - 7.00 10*3/mm3 Final   • Lymphocytes Absolute 11/05/2020 1.00  0.70 - 3.10 10*3/mm3 Final   • Monocytes Absolute 11/05/2020 0.43  0.10 - 0.90 10*3/mm3 Final   • Eosinophils Absolute 11/05/2020 0.14  0.00 - 0.40 10*3/mm3 Final   • Basophils Absolute 11/05/2020 0.04  0.00 - 0.20 10*3/mm3 Final   • Immature Granulocyte Rel % 11/05/2020 0.3  0.0 - 0.5 % Final   • Immature Grans Absolute 11/05/2020 0.01  0.00 - 0.05 10*3/mm3 Final   • nRBC 11/05/2020 0.0  0.0 - 0.2 /100 WBC Final   • Glucose 11/05/2020 100* 65 - 99 mg/dL Final   • BUN 11/05/2020 32* 8 - 23 mg/dL Final   • Creatinine 11/05/2020 0.92  0.76 - 1.27 mg/dL Final   • eGFR Non African Am 11/05/2020 82  >60 mL/min/1.73 Final   • eGFR   Am 11/05/2020 99  >60 mL/min/1.73 Final   • BUN/Creatinine Ratio 11/05/2020 34.8* 7.0 - 25.0 Final   • Sodium 11/05/2020 136  136 - 145 mmol/L Final   • Potassium 11/05/2020 4.4  3.5 - 5.2 mmol/L Final   • Chloride 11/05/2020 100  98 - 107 mmol/L Final   • Total CO2 11/05/2020 29.0  22.0 - 29.0 mmol/L Final   • Calcium 11/05/2020 9.0  8.6 - 10.5 mg/dL Final   • Total Protein 11/05/2020 6.0  6.0 - 8.5 g/dL Final   • Albumin 11/05/2020 4.30  3.50 - 5.20 g/dL Final   • Globulin 11/05/2020 1.7  gm/dL Final   • A/G Ratio 11/05/2020 2.5  g/dL Final   • Total Bilirubin 11/05/2020 0.6  0.0 - 1.2 mg/dL Final   • Alkaline Phosphatase 11/05/2020 56  39 - 117 U/L Final   • AST (SGOT) 11/05/2020 25  1 - 40 U/L Final   • ALT (SGPT) 11/05/2020 22  1 - 41 U/L Final   • Hemoglobin A1C 11/05/2020 5.93* 4.80 - 5.60 % Final    Comment: Hemoglobin A1C Ranges:  Increased Risk for Diabetes  5.7% to 6.4%  Diabetes                     >= 6.5%  Diabetic Goal                < 7.0%     • Total Cholesterol 11/05/2020 195  0 - 200 mg/dL Final   • Triglycerides 11/05/2020 106  0 - 150 mg/dL Final   • HDL Cholesterol 11/05/2020 64* 40 - 60 mg/dL Final   • VLDL Cholesterol Ronni 11/05/2020 19  5 - 40 mg/dL Final   • LDL Chol Calc (NIH) 11/05/2020 112* 0 - 100 mg/dL Final   • PSA 11/05/2020 0.315  0.000 - 4.000 ng/mL Final    Results may be falsely decreased if patient taking Biotin.   • 25 Hydroxy, Vitamin D 11/05/2020 67.6  30.0 - 100.0 ng/ml Final    Comment: Results may be falsely increased if patient taking Biotin.  Reference Range for Total Vitamin D 25(OH)  Deficiency <20.0 ng/mL  Insufficiency 21-29 ng/mL  Sufficiency  ng/mL  Toxicity >100 ng/ml           Body mass index is 28.07 kg/m².    Assessment/Plan   Patient Self-Management and Personalized Health Advice  The patient has been provided with information about: diet, exercise, weight management and prevention of cardiac or vascular disease and preventive services including:    · Annual Wellness Visit (AWV)  · Cardiovascular Disease Screening Tests (may do this order every 5 years in beneficiaries without signs or symptoms of cardiovascular disease)  · Colorectal Cancer Screening, Colonoscopy  · Diabetes Screening-Lab Order for either glucose quantitative blood (except reagent strip), glucose;post glucose dose(includes glucose), or glucose tolerance test-3 specimens(includes glucose)  · Influenza Vaccine and Administration  · Prostate Cancer Screening .    Discussed the patient's BMI with him. The BMI is in the acceptable range.    Orders:  Orders Placed This Encounter   Procedures   • XR Hip With or Without Pelvis 2 - 3 View Right   • Lipid panel   • Hemoglobin A1c   • Comprehensive metabolic panel   • TSH   • Vitamin D 25 hydroxy   • CBC w AUTO Differential   X-rays of right hip will be obtained and further evaluation and/or treatment will be based on the above.  Patient must optimize weight in regards to sugar status.  Review of indicated immunizations took place with patient to consider.  Follow-up in 6 months preceded by fasting laboratory studies.    Follow Up:  Return in about 6 months (around 5/16/2021) for Recheck.     An After Visit Summary and PPPS with all of these plans were given to the patient.

## 2020-11-30 RX ORDER — SILDENAFIL CITRATE 20 MG/1
TABLET ORAL
Qty: 30 TABLET | Refills: 5 | Status: SHIPPED | OUTPATIENT
Start: 2020-11-30 | End: 2021-12-01

## 2020-12-28 RX ORDER — MELOXICAM 15 MG/1
TABLET ORAL
Qty: 90 TABLET | Refills: 1 | Status: SHIPPED | OUTPATIENT
Start: 2020-12-28 | End: 2021-05-24 | Stop reason: SDUPTHER

## 2021-05-13 DIAGNOSIS — I71.40 ABDOMINAL AORTIC ANEURYSM (AAA) WITHOUT RUPTURE (HCC): ICD-10-CM

## 2021-05-13 DIAGNOSIS — R73.02 GLUCOSE INTOLERANCE (IMPAIRED GLUCOSE TOLERANCE): ICD-10-CM

## 2021-05-13 DIAGNOSIS — E78.2 MIXED HYPERLIPIDEMIA: ICD-10-CM

## 2021-05-13 DIAGNOSIS — K21.9 GASTROESOPHAGEAL REFLUX DISEASE WITHOUT ESOPHAGITIS: ICD-10-CM

## 2021-05-13 DIAGNOSIS — Z00.00 MEDICARE ANNUAL WELLNESS VISIT, SUBSEQUENT: ICD-10-CM

## 2021-05-13 DIAGNOSIS — M25.559 HIP PAIN: ICD-10-CM

## 2021-05-13 DIAGNOSIS — E55.9 VITAMIN D DEFICIENCY: ICD-10-CM

## 2021-05-13 DIAGNOSIS — I48.91 ATRIAL FIBRILLATION WITH RAPID VENTRICULAR RESPONSE (HCC): ICD-10-CM

## 2021-05-18 LAB
25(OH)D3+25(OH)D2 SERPL-MCNC: 77.7 NG/ML (ref 30–100)
ALBUMIN SERPL-MCNC: 4.3 G/DL (ref 3.5–5.2)
ALBUMIN/GLOB SERPL: 2.5 G/DL
ALP SERPL-CCNC: 58 U/L (ref 39–117)
ALT SERPL-CCNC: 23 U/L (ref 1–41)
AST SERPL-CCNC: 21 U/L (ref 1–40)
BASOPHILS # BLD AUTO: 0.04 10*3/MM3 (ref 0–0.2)
BASOPHILS NFR BLD AUTO: 1 % (ref 0–1.5)
BILIRUB SERPL-MCNC: 0.7 MG/DL (ref 0–1.2)
BUN SERPL-MCNC: 33 MG/DL (ref 8–23)
BUN/CREAT SERPL: 37.5 (ref 7–25)
CALCIUM SERPL-MCNC: 9.4 MG/DL (ref 8.6–10.5)
CHLORIDE SERPL-SCNC: 105 MMOL/L (ref 98–107)
CHOLEST SERPL-MCNC: 185 MG/DL (ref 0–200)
CO2 SERPL-SCNC: 29.9 MMOL/L (ref 22–29)
CREAT SERPL-MCNC: 0.88 MG/DL (ref 0.76–1.27)
EOSINOPHIL # BLD AUTO: 0.22 10*3/MM3 (ref 0–0.4)
EOSINOPHIL NFR BLD AUTO: 5.4 % (ref 0.3–6.2)
ERYTHROCYTE [DISTWIDTH] IN BLOOD BY AUTOMATED COUNT: 12.1 % (ref 12.3–15.4)
GLOBULIN SER CALC-MCNC: 1.7 GM/DL
GLUCOSE SERPL-MCNC: 103 MG/DL (ref 65–99)
HBA1C MFR BLD: 5.9 % (ref 4.8–5.6)
HCT VFR BLD AUTO: 44.9 % (ref 37.5–51)
HDLC SERPL-MCNC: 61 MG/DL (ref 40–60)
HGB BLD-MCNC: 15.1 G/DL (ref 13–17.7)
IMM GRANULOCYTES # BLD AUTO: 0.01 10*3/MM3 (ref 0–0.05)
IMM GRANULOCYTES NFR BLD AUTO: 0.2 % (ref 0–0.5)
LDLC SERPL CALC-MCNC: 105 MG/DL (ref 0–100)
LYMPHOCYTES # BLD AUTO: 1.21 10*3/MM3 (ref 0.7–3.1)
LYMPHOCYTES NFR BLD AUTO: 29.4 % (ref 19.6–45.3)
MCH RBC QN AUTO: 33 PG (ref 26.6–33)
MCHC RBC AUTO-ENTMCNC: 33.6 G/DL (ref 31.5–35.7)
MCV RBC AUTO: 98.2 FL (ref 79–97)
MONOCYTES # BLD AUTO: 0.43 10*3/MM3 (ref 0.1–0.9)
MONOCYTES NFR BLD AUTO: 10.5 % (ref 5–12)
NEUTROPHILS # BLD AUTO: 2.2 10*3/MM3 (ref 1.7–7)
NEUTROPHILS NFR BLD AUTO: 53.5 % (ref 42.7–76)
NRBC BLD AUTO-RTO: 0.2 /100 WBC (ref 0–0.2)
PLATELET # BLD AUTO: 196 10*3/MM3 (ref 140–450)
POTASSIUM SERPL-SCNC: 4.5 MMOL/L (ref 3.5–5.2)
PROT SERPL-MCNC: 6 G/DL (ref 6–8.5)
RBC # BLD AUTO: 4.57 10*6/MM3 (ref 4.14–5.8)
SODIUM SERPL-SCNC: 139 MMOL/L (ref 136–145)
TRIGL SERPL-MCNC: 106 MG/DL (ref 0–150)
TSH SERPL DL<=0.005 MIU/L-ACNC: 1.48 UIU/ML (ref 0.27–4.2)
VLDLC SERPL CALC-MCNC: 19 MG/DL (ref 5–40)
WBC # BLD AUTO: 4.11 10*3/MM3 (ref 3.4–10.8)

## 2021-05-24 ENCOUNTER — OFFICE VISIT (OUTPATIENT)
Dept: FAMILY MEDICINE CLINIC | Facility: CLINIC | Age: 66
End: 2021-05-24

## 2021-05-24 VITALS
SYSTOLIC BLOOD PRESSURE: 130 MMHG | BODY MASS INDEX: 28.71 KG/M2 | OXYGEN SATURATION: 99 % | TEMPERATURE: 97.1 F | HEIGHT: 72 IN | DIASTOLIC BLOOD PRESSURE: 80 MMHG | HEART RATE: 63 BPM | WEIGHT: 212 LBS

## 2021-05-24 DIAGNOSIS — R73.02 GLUCOSE INTOLERANCE (IMPAIRED GLUCOSE TOLERANCE): ICD-10-CM

## 2021-05-24 DIAGNOSIS — N52.9 ERECTILE DYSFUNCTION, UNSPECIFIED ERECTILE DYSFUNCTION TYPE: ICD-10-CM

## 2021-05-24 DIAGNOSIS — K21.9 GASTROESOPHAGEAL REFLUX DISEASE WITHOUT ESOPHAGITIS: ICD-10-CM

## 2021-05-24 DIAGNOSIS — I48.91 ATRIAL FIBRILLATION WITH RAPID VENTRICULAR RESPONSE (HCC): Primary | ICD-10-CM

## 2021-05-24 DIAGNOSIS — E78.2 MIXED HYPERLIPIDEMIA: ICD-10-CM

## 2021-05-24 DIAGNOSIS — E55.9 VITAMIN D DEFICIENCY: ICD-10-CM

## 2021-05-24 DIAGNOSIS — M15.9 PRIMARY OSTEOARTHRITIS INVOLVING MULTIPLE JOINTS: ICD-10-CM

## 2021-05-24 DIAGNOSIS — R35.1 NOCTURIA: ICD-10-CM

## 2021-05-24 PROCEDURE — 99213 OFFICE O/P EST LOW 20 MIN: CPT | Performed by: FAMILY MEDICINE

## 2021-05-24 RX ORDER — METOPROLOL SUCCINATE 25 MG/1
25 TABLET, EXTENDED RELEASE ORAL DAILY
Qty: 90 TABLET | Refills: 1 | Status: SHIPPED | OUTPATIENT
Start: 2021-05-24 | End: 2021-11-18 | Stop reason: SDUPTHER

## 2021-05-24 RX ORDER — MELOXICAM 15 MG/1
15 TABLET ORAL DAILY
Qty: 90 TABLET | Refills: 1 | Status: SHIPPED | OUTPATIENT
Start: 2021-05-24 | End: 2021-06-28

## 2021-05-24 NOTE — PROGRESS NOTES
Subjective   Alfred Vazquez is a 65 y.o. male with   Chief Complaint   Patient presents with   • Hyperlipidemia   .    History of Present Illness   65-year-old white male with multiple medical problems here for further medical management.  Patient with history of atrial fibrillation with rapid ventricular response, hyperlipidemia, glucose intolerance as well as vitamin D deficiency.  He also has arthritis at multiple levels and does suffer from erectile dysfunction as well as BPH.  Fasting labs were drawn prior to this visit.  Current medications include famotidine at 10 mg daily as well as Tambocor at 100 mg twice daily and meloxicam at 15 mg daily on a as needed basis.  Toprol-XL at 25 mg daily is used with the occasional use of sildenafil.  Patient is also using 81 mg aspirin daily.  In general he is doing well and is without acute complaint.  The following portions of the patient's history were reviewed and updated as appropriate: allergies, current medications, past family history, past medical history, past social history, past surgical history and problem list.    Review of Systems   Cardiovascular: Positive for palpitations.        Hyperlipidemia   Gastrointestinal:        GERD   Musculoskeletal: Positive for arthralgias.       Objective     Vitals:    05/24/21 0854   BP: 130/80   Pulse: 63   Temp: 97.1 °F (36.2 °C)   SpO2: 99%       Recent Results (from the past 672 hour(s))   CBC w AUTO Differential    Collection Time: 05/17/21  9:07 AM    Specimen: Blood   Result Value Ref Range    WBC 4.11 3.40 - 10.80 10*3/mm3    RBC 4.57 4.14 - 5.80 10*6/mm3    Hemoglobin 15.1 13.0 - 17.7 g/dL    Hematocrit 44.9 37.5 - 51.0 %    MCV 98.2 (H) 79.0 - 97.0 fL    MCH 33.0 26.6 - 33.0 pg    MCHC 33.6 31.5 - 35.7 g/dL    RDW 12.1 (L) 12.3 - 15.4 %    Platelets 196 140 - 450 10*3/mm3    Neutrophil Rel % 53.5 42.7 - 76.0 %    Lymphocyte Rel % 29.4 19.6 - 45.3 %    Monocyte Rel % 10.5 5.0 - 12.0 %    Eosinophil Rel % 5.4 0.3 -  6.2 %    Basophil Rel % 1.0 0.0 - 1.5 %    Neutrophils Absolute 2.20 1.70 - 7.00 10*3/mm3    Lymphocytes Absolute 1.21 0.70 - 3.10 10*3/mm3    Monocytes Absolute 0.43 0.10 - 0.90 10*3/mm3    Eosinophils Absolute 0.22 0.00 - 0.40 10*3/mm3    Basophils Absolute 0.04 0.00 - 0.20 10*3/mm3    Immature Granulocyte Rel % 0.2 0.0 - 0.5 %    Immature Grans Absolute 0.01 0.00 - 0.05 10*3/mm3    nRBC 0.2 0.0 - 0.2 /100 WBC   Vitamin D 25 hydroxy    Collection Time: 05/17/21  9:07 AM    Specimen: Blood   Result Value Ref Range    25 Hydroxy, Vitamin D 77.7 30.0 - 100.0 ng/ml   TSH    Collection Time: 05/17/21  9:07 AM    Specimen: Blood   Result Value Ref Range    TSH 1.480 0.270 - 4.200 uIU/mL   Comprehensive metabolic panel    Collection Time: 05/17/21  9:07 AM    Specimen: Blood   Result Value Ref Range    Glucose 103 (H) 65 - 99 mg/dL    BUN 33 (H) 8 - 23 mg/dL    Creatinine 0.88 0.76 - 1.27 mg/dL    eGFR Non African Am 86 >60 mL/min/1.73    eGFR African Am 105 >60 mL/min/1.73    BUN/Creatinine Ratio 37.5 (H) 7.0 - 25.0    Sodium 139 136 - 145 mmol/L    Potassium 4.5 3.5 - 5.2 mmol/L    Chloride 105 98 - 107 mmol/L    Total CO2 29.9 (H) 22.0 - 29.0 mmol/L    Calcium 9.4 8.6 - 10.5 mg/dL    Total Protein 6.0 6.0 - 8.5 g/dL    Albumin 4.30 3.50 - 5.20 g/dL    Globulin 1.7 gm/dL    A/G Ratio 2.5 g/dL    Total Bilirubin 0.7 0.0 - 1.2 mg/dL    Alkaline Phosphatase 58 39 - 117 U/L    AST (SGOT) 21 1 - 40 U/L    ALT (SGPT) 23 1 - 41 U/L   Hemoglobin A1c    Collection Time: 05/17/21  9:07 AM    Specimen: Blood   Result Value Ref Range    Hemoglobin A1C 5.90 (H) 4.80 - 5.60 %   Lipid panel    Collection Time: 05/17/21  9:07 AM    Specimen: Blood   Result Value Ref Range    Total Cholesterol 185 0 - 200 mg/dL    Triglycerides 106 0 - 150 mg/dL    HDL Cholesterol 61 (H) 40 - 60 mg/dL    VLDL Cholesterol Ronni 19 5 - 40 mg/dL    LDL Chol Calc (NIH) 105 (H) 0 - 100 mg/dL       Physical Exam  Vitals and nursing note reviewed.    Constitutional:       Appearance: Normal appearance. He is well-developed and well-groomed.   HENT:      Head: Normocephalic and atraumatic.   Neck:      Thyroid: No thyroid mass or thyromegaly.      Vascular: Normal carotid pulses. No carotid bruit.      Trachea: Trachea and phonation normal.   Cardiovascular:      Rate and Rhythm: Normal rate and regular rhythm.      Heart sounds: Normal heart sounds. No murmur heard.   No friction rub. No gallop.    Pulmonary:      Effort: Pulmonary effort is normal. No respiratory distress.      Breath sounds: Normal breath sounds. No decreased breath sounds, wheezing, rhonchi or rales.   Musculoskeletal:      Cervical back: Neck supple.   Lymphadenopathy:      Cervical: No cervical adenopathy.   Skin:     General: Skin is warm and dry.      Findings: No rash.   Neurological:      Mental Status: He is alert and oriented to person, place, and time.   Psychiatric:         Attention and Perception: Attention and perception normal.         Mood and Affect: Mood and affect normal.         Speech: Speech normal.         Behavior: Behavior normal. Behavior is cooperative.         Thought Content: Thought content normal.         Cognition and Memory: Cognition and memory normal.         Judgment: Judgment normal.         Assessment/Plan   Diagnoses and all orders for this visit:    1. Atrial fibrillation with rapid ventricular response (CMS/HCC) (Primary)  -     metoprolol succinate XL (TOPROL-XL) 25 MG 24 hr tablet; Take 1 tablet by mouth Daily.  Dispense: 90 tablet; Refill: 1  -     CBC w AUTO Differential; Future  -     Lipid panel; Future  -     Hemoglobin A1c; Future  -     Comprehensive metabolic panel; Future  -     Vitamin D 25 hydroxy; Future  -     PSA DIAGNOSTIC ONLY; Future    2. Mixed hyperlipidemia  -     CBC w AUTO Differential; Future  -     Lipid panel; Future  -     Hemoglobin A1c; Future  -     Comprehensive metabolic panel; Future  -     Vitamin D 25 hydroxy;  Future  -     PSA DIAGNOSTIC ONLY; Future    3. Glucose intolerance (impaired glucose tolerance)  -     CBC w AUTO Differential; Future  -     Lipid panel; Future  -     Hemoglobin A1c; Future  -     Comprehensive metabolic panel; Future  -     Vitamin D 25 hydroxy; Future  -     PSA DIAGNOSTIC ONLY; Future    4. Vitamin D deficiency  -     CBC w AUTO Differential; Future  -     Lipid panel; Future  -     Hemoglobin A1c; Future  -     Comprehensive metabolic panel; Future  -     Vitamin D 25 hydroxy; Future  -     PSA DIAGNOSTIC ONLY; Future    5. Gastroesophageal reflux disease without esophagitis  -     CBC w AUTO Differential; Future  -     Lipid panel; Future  -     Hemoglobin A1c; Future  -     Comprehensive metabolic panel; Future  -     Vitamin D 25 hydroxy; Future  -     PSA DIAGNOSTIC ONLY; Future    6. Erectile dysfunction, unspecified erectile dysfunction type  -     CBC w AUTO Differential; Future  -     Lipid panel; Future  -     Hemoglobin A1c; Future  -     Comprehensive metabolic panel; Future  -     Vitamin D 25 hydroxy; Future  -     PSA DIAGNOSTIC ONLY; Future    7. Primary osteoarthritis involving multiple joints  -     meloxicam (MOBIC) 15 MG tablet; Take 1 tablet by mouth Daily.  Dispense: 90 tablet; Refill: 1  -     CBC w AUTO Differential; Future  -     Lipid panel; Future  -     Hemoglobin A1c; Future  -     Comprehensive metabolic panel; Future  -     Vitamin D 25 hydroxy; Future  -     PSA DIAGNOSTIC ONLY; Future    8. Nocturia  -     PSA DIAGNOSTIC ONLY; Future        Return in about 6 months (around 11/24/2021) for Recheck.

## 2021-06-25 DIAGNOSIS — M15.9 PRIMARY OSTEOARTHRITIS INVOLVING MULTIPLE JOINTS: ICD-10-CM

## 2021-06-28 RX ORDER — MELOXICAM 15 MG/1
TABLET ORAL
Qty: 90 TABLET | Refills: 0 | Status: SHIPPED | OUTPATIENT
Start: 2021-06-28 | End: 2021-11-18 | Stop reason: SDUPTHER

## 2021-09-28 ENCOUNTER — OFFICE VISIT (OUTPATIENT)
Dept: FAMILY MEDICINE CLINIC | Facility: CLINIC | Age: 66
End: 2021-09-28

## 2021-09-28 VITALS
WEIGHT: 208 LBS | HEIGHT: 72 IN | HEART RATE: 62 BPM | BODY MASS INDEX: 28.17 KG/M2 | DIASTOLIC BLOOD PRESSURE: 80 MMHG | TEMPERATURE: 97.3 F | SYSTOLIC BLOOD PRESSURE: 138 MMHG | OXYGEN SATURATION: 98 %

## 2021-09-28 DIAGNOSIS — R10.12 LEFT UPPER QUADRANT ABDOMINAL PAIN: Primary | ICD-10-CM

## 2021-09-28 PROCEDURE — 99214 OFFICE O/P EST MOD 30 MIN: CPT | Performed by: FAMILY MEDICINE

## 2021-09-28 NOTE — PROGRESS NOTES
Chief Complaint   Patient presents with   • Abdominal Pain       Subjective   Alfred Vazquez is a 65 y.o. male.     History of Present Illness   65-year-old male here for acute add-on visit    Left upper quadrant abdominal pain times been going on several months now: Describes as dull aching pain.  Has been persistent.  No associated symptoms.  No bowel movement issues.  No significant acid reflux.  Does not seem to be associated with types of food he eats.  Normal diet.  No weight loss.    Normal colonoscopy in 2019, no history of any diverticulitis.  Normal HIDA scan in 2019.  No prior gallbladder issues.  No prior abdominal surgeries.  He does take a Pepcid daily.  He feels indigestion is well controlled.    Otherwise feels well wanted to get this checked out as it was persisting.  Denies any significant episodes of pain associated with any nausea vomiting etc.  Described more as a nagging dull ache that he notices intermittently.     The following portions of the patient's history were reviewed and updated as appropriate: allergies, current medications, past family history, past medical history, past social history, past surgical history and problem list.      Past Medical History:   Diagnosis Date   • Acute upper respiratory infection    • Allergic rhinitis    • Arthritis    • Atrial fibrillation (CMS/HCC)    • Benign prostatic hypertrophy    • DDD (degenerative disc disease), cervical     C-SPINE   • ED (erectile dysfunction) of non-organic origin    • Glucose intolerance (malabsorption)    • Hordeolum     INTERNAL   • Lateral epicondylitis of right elbow    • Nausea and vomiting    • Obesity    • Patellar tendinitis of left knee    • Precordial chest pain    • Urethritis        Past Surgical History:   Procedure Laterality Date   • TONSILLECTOMY AND ADENOIDECTOMY     • TOOTH EXTRACTION         Family History   Problem Relation Age of Onset   • No Known Problems Mother    • Aneurysm Father         AORTIC   •  Other Father         EYE PROBLEMS, TOTAL KNEE REPLACEMENT   • Lung cancer Father    • Obesity Sister    • Obesity Brother    • Stroke Other    • Diabetes Other    • Suicide Attempts Other        Social History     Socioeconomic History   • Marital status:      Spouse name: Not on file   • Number of children: Not on file   • Years of education: Not on file   • Highest education level: Not on file   Tobacco Use   • Smoking status: Former Smoker     Packs/day: 0.25     Years: 2.00     Pack years: 0.50     Types: Cigarettes     Quit date: 1970     Years since quittin.7   • Smokeless tobacco: Never Used   Substance and Sexual Activity   • Alcohol use: Yes   • Drug use: No   • Sexual activity: Defer       Current Outpatient Medications on File Prior to Visit   Medication Sig Dispense Refill   • aspirin 81 MG chewable tablet Chew 1 tablet daily.     • CALCIUM CARBONATE-VITAMIN D PO Take  by mouth.     • Cholecalciferol (VITAMIN D3) 125 MCG (5000 UT) capsule capsule Take 5,000 Units by mouth Daily.     • famotidine (PEPCID) 10 MG tablet Take 10 mg by mouth Daily.     • flecainide (TAMBOCOR) 100 MG tablet Take 100 mg by mouth 2 (Two) Times a Day.     • meloxicam (MOBIC) 15 MG tablet TAKE ONE TABLET BY MOUTH DAILY 90 tablet 0   • metoprolol succinate XL (TOPROL-XL) 25 MG 24 hr tablet Take 1 tablet by mouth Daily. 90 tablet 1   • PREBIOTIC PRODUCT PO Take  by mouth.     • sildenafil (REVATIO) 20 MG tablet TAKE 2-5 TABLETS BY MOUTH 1 HOUR PRIOR TO EVENT 30 tablet 5     No current facility-administered medications on file prior to visit.       Review of Systems   Constitutional: Negative for chills and fever.   HENT: Negative for congestion.    Respiratory: Negative for shortness of breath and wheezing.    Cardiovascular: Negative for chest pain.   Gastrointestinal: Positive for abdominal pain and indigestion. Negative for blood in stool, constipation, diarrhea, nausea, rectal pain and vomiting.   Genitourinary:  Negative for difficulty urinating, dysuria, flank pain, frequency and urgency.   Musculoskeletal: Negative for back pain.   Skin: Negative for rash.   Neurological: Negative for dizziness, weakness and confusion.   Psychiatric/Behavioral: Negative for depressed mood.           Vitals:    09/28/21 0957   BP: 138/80   Pulse: 62   Temp: 97.3 °F (36.3 °C)   SpO2: 98%      Objective   Physical Exam  Vitals and nursing note reviewed.   Constitutional:       Appearance: He is well-developed.   HENT:      Head: Normocephalic.   Eyes:      Pupils: Pupils are equal, round, and reactive to light.   Cardiovascular:      Rate and Rhythm: Normal rate and regular rhythm.      Heart sounds: Normal heart sounds. No murmur heard.     Pulmonary:      Effort: Pulmonary effort is normal. No respiratory distress.      Breath sounds: Normal breath sounds.   Abdominal:      General: Bowel sounds are normal.      Palpations: Abdomen is soft.   Musculoskeletal:         General: Normal range of motion.      Cervical back: Neck supple.   Skin:     General: Skin is warm and dry.   Neurological:      Mental Status: He is alert and oriented to person, place, and time.         Benign abdominal exam    Assessment/Plan   Problems Addressed this Visit     None      Visit Diagnoses     Left upper quadrant abdominal pain    -  Primary    Relevant Orders    US Abdomen Complete      Diagnoses       Codes Comments    Left upper quadrant abdominal pain    -  Primary ICD-10-CM: R10.12  ICD-9-CM: 789.02         -Can further evaluate with abdominal ultrasound.  Abdomen soft nontender on exam today.  -May add in a daily Metamucil/additional fiber to his diet and see if that makes a difference.  May try and keep food diary         Shellie Goodman MD seek further care more urgently if anything changes or worsens

## 2021-10-07 ENCOUNTER — FLU SHOT (OUTPATIENT)
Dept: FAMILY MEDICINE CLINIC | Facility: CLINIC | Age: 66
End: 2021-10-07

## 2021-10-07 DIAGNOSIS — Z23 NEED FOR INFLUENZA VACCINATION: Primary | ICD-10-CM

## 2021-10-07 PROCEDURE — G0008 ADMIN INFLUENZA VIRUS VAC: HCPCS | Performed by: PHYSICIAN ASSISTANT

## 2021-10-07 PROCEDURE — 90662 IIV NO PRSV INCREASED AG IM: CPT | Performed by: PHYSICIAN ASSISTANT

## 2021-10-08 ENCOUNTER — HOSPITAL ENCOUNTER (OUTPATIENT)
Dept: ULTRASOUND IMAGING | Facility: HOSPITAL | Age: 66
Discharge: HOME | End: 2021-10-08
Admitting: FAMILY MEDICINE

## 2021-10-08 DIAGNOSIS — R10.12 LEFT UPPER QUADRANT ABDOMINAL PAIN: ICD-10-CM

## 2021-10-08 PROCEDURE — 76700 US EXAM ABDOM COMPLETE: CPT

## 2021-10-11 ENCOUNTER — TELEPHONE (OUTPATIENT)
Dept: FAMILY MEDICINE CLINIC | Facility: CLINIC | Age: 66
End: 2021-10-11

## 2021-10-11 NOTE — TELEPHONE ENCOUNTER
Caller: Alfred Vazquez    Relationship: Self    Best call back number: [phone]    Caller requesting test results: PATIENT    What test was performed: ULTRASOUND    When was the test performed: 10/8    Where was the test performed: [facility]     Additional notes: HE HAS SEEN THE RESULTS BUT WOULD LIKE TO SPEAK WITH SOMEONE ABOUT THEM

## 2021-10-12 ENCOUNTER — OFFICE VISIT (OUTPATIENT)
Dept: FAMILY MEDICINE CLINIC | Facility: CLINIC | Age: 66
End: 2021-10-12

## 2021-10-12 VITALS
DIASTOLIC BLOOD PRESSURE: 80 MMHG | SYSTOLIC BLOOD PRESSURE: 124 MMHG | WEIGHT: 210 LBS | HEIGHT: 72 IN | OXYGEN SATURATION: 98 % | BODY MASS INDEX: 28.44 KG/M2 | TEMPERATURE: 97.1 F | HEART RATE: 56 BPM

## 2021-10-12 DIAGNOSIS — R10.12 LEFT UPPER QUADRANT ABDOMINAL PAIN: ICD-10-CM

## 2021-10-12 DIAGNOSIS — R10.13 EPIGASTRIC PAIN: Primary | ICD-10-CM

## 2021-10-12 PROCEDURE — 99213 OFFICE O/P EST LOW 20 MIN: CPT | Performed by: FAMILY MEDICINE

## 2021-10-13 NOTE — PROGRESS NOTES
Subjective   Alfred Vazquez is a 66 y.o. male with   Chief Complaint   Patient presents with   • Abdominal Pain     LUQ   .    History of Present Illness   66-year-old white male with complaint of epigastric/left upper quadrant abdominal pain that has been present off and on for several months.  This pain apparently is not associated with eating or types of food.  Bowel movements have remained normal-brown and formed without melena or hematochezia.  There are times that he will not have a bowel movement for 1 day but has a normal bowel movement the next day.  He denies overt constipation or diarrhea.  There has been no nausea/vomiting or hematemesis.  Patient denies appetite change or weight loss.  He is current with colonoscopy-Dr. Williamson in 2019-normal.  He was seen previously in this office by another provider and had an abdominal ultrasound which gave limited view of the pancreas, stable biliary duct dilatation (apparently seen on previous evaluation), and steatosis of the liver.  He denies fever and again is unaware of any exacerbating factors or relieving factors.  He states that there are times that he will lay down in bed and this pain will become very severe and he will sit up at night for approximately 2 hours only to have the pain completely relieved and he goes back to normal.  The following portions of the patient's history were reviewed and updated as appropriate: allergies, current medications, past family history, past medical history, past social history, past surgical history and problem list.    Review of Systems   Gastrointestinal: Positive for abdominal pain.       Objective     Vitals:    10/12/21 1619   BP: 124/80   Pulse: 56   Temp: 97.1 °F (36.2 °C)   SpO2: 98%       No results found for this or any previous visit (from the past 672 hour(s)).    Physical Exam  Vitals and nursing note reviewed.   Constitutional:       Appearance: Normal appearance. He is well-developed and well-groomed.    HENT:      Head: Normocephalic and atraumatic.   Neck:      Thyroid: No thyroid mass or thyromegaly.      Vascular: Normal carotid pulses. No carotid bruit.      Trachea: Trachea and phonation normal.   Cardiovascular:      Rate and Rhythm: Normal rate and regular rhythm.      Heart sounds: Normal heart sounds. No murmur heard.  No friction rub. No gallop.    Pulmonary:      Effort: Pulmonary effort is normal. No respiratory distress.      Breath sounds: Normal breath sounds. No decreased breath sounds, wheezing, rhonchi or rales.   Abdominal:      General: Abdomen is flat. Bowel sounds are normal. There is no distension.      Palpations: Abdomen is soft. There is no hepatomegaly, splenomegaly or mass.      Tenderness: There is abdominal tenderness in the epigastric area and left upper quadrant. There is no right CVA tenderness, left CVA tenderness, guarding or rebound.      Hernia: No hernia is present.   Musculoskeletal:      Cervical back: Neck supple.   Lymphadenopathy:      Cervical: No cervical adenopathy.   Skin:     General: Skin is warm and dry.      Findings: No rash.   Neurological:      Mental Status: He is alert and oriented to person, place, and time.   Psychiatric:         Attention and Perception: Attention and perception normal.         Mood and Affect: Mood and affect normal.         Speech: Speech normal.         Behavior: Behavior normal. Behavior is cooperative.         Thought Content: Thought content normal.         Cognition and Memory: Cognition and memory normal.         Judgment: Judgment normal.         Assessment/Plan   Diagnoses and all orders for this visit:    1. Epigastric pain (Primary)  -     Ambulatory Referral to Gastroenterology    2. Left upper quadrant abdominal pain  -     Ambulatory Referral to Gastroenterology        Return if symptoms worsen or fail to improve.

## 2021-11-05 DIAGNOSIS — I48.91 ATRIAL FIBRILLATION WITH RAPID VENTRICULAR RESPONSE (HCC): ICD-10-CM

## 2021-11-05 DIAGNOSIS — R35.1 NOCTURIA: ICD-10-CM

## 2021-11-05 DIAGNOSIS — N52.9 ERECTILE DYSFUNCTION, UNSPECIFIED ERECTILE DYSFUNCTION TYPE: ICD-10-CM

## 2021-11-05 DIAGNOSIS — M15.9 PRIMARY OSTEOARTHRITIS INVOLVING MULTIPLE JOINTS: ICD-10-CM

## 2021-11-05 DIAGNOSIS — E78.2 MIXED HYPERLIPIDEMIA: ICD-10-CM

## 2021-11-05 DIAGNOSIS — R73.02 GLUCOSE INTOLERANCE (IMPAIRED GLUCOSE TOLERANCE): ICD-10-CM

## 2021-11-05 DIAGNOSIS — K21.9 GASTROESOPHAGEAL REFLUX DISEASE WITHOUT ESOPHAGITIS: ICD-10-CM

## 2021-11-05 DIAGNOSIS — E55.9 VITAMIN D DEFICIENCY: ICD-10-CM

## 2021-11-09 LAB
25(OH)D3+25(OH)D2 SERPL-MCNC: 83.8 NG/ML (ref 30–100)
ALBUMIN SERPL-MCNC: 4.1 G/DL (ref 3.8–4.8)
ALBUMIN/GLOB SERPL: 1.9 {RATIO} (ref 1.2–2.2)
ALP SERPL-CCNC: 58 IU/L (ref 44–121)
ALT SERPL-CCNC: 22 IU/L (ref 0–44)
AST SERPL-CCNC: 24 IU/L (ref 0–40)
BASOPHILS # BLD AUTO: 0.1 X10E3/UL (ref 0–0.2)
BASOPHILS NFR BLD AUTO: 1 %
BILIRUB SERPL-MCNC: 0.6 MG/DL (ref 0–1.2)
BUN SERPL-MCNC: 22 MG/DL (ref 8–27)
BUN/CREAT SERPL: 23 (ref 10–24)
CALCIUM SERPL-MCNC: 9.2 MG/DL (ref 8.6–10.2)
CHLORIDE SERPL-SCNC: 103 MMOL/L (ref 96–106)
CHOLEST SERPL-MCNC: 190 MG/DL (ref 100–199)
CO2 SERPL-SCNC: 24 MMOL/L (ref 20–29)
CREAT SERPL-MCNC: 0.94 MG/DL (ref 0.76–1.27)
EOSINOPHIL # BLD AUTO: 0.1 X10E3/UL (ref 0–0.4)
EOSINOPHIL NFR BLD AUTO: 3 %
ERYTHROCYTE [DISTWIDTH] IN BLOOD BY AUTOMATED COUNT: 12 % (ref 11.6–15.4)
GLOBULIN SER CALC-MCNC: 2.2 G/DL (ref 1.5–4.5)
GLUCOSE SERPL-MCNC: 104 MG/DL (ref 65–99)
HBA1C MFR BLD: 6.2 % (ref 4.8–5.6)
HCT VFR BLD AUTO: 41.4 % (ref 37.5–51)
HDLC SERPL-MCNC: 57 MG/DL
HGB BLD-MCNC: 14.1 G/DL (ref 13–17.7)
IMM GRANULOCYTES # BLD AUTO: 0 X10E3/UL (ref 0–0.1)
IMM GRANULOCYTES NFR BLD AUTO: 0 %
LDLC SERPL CALC-MCNC: 108 MG/DL (ref 0–99)
LYMPHOCYTES # BLD AUTO: 1.5 X10E3/UL (ref 0.7–3.1)
LYMPHOCYTES NFR BLD AUTO: 35 %
MCH RBC QN AUTO: 32.3 PG (ref 26.6–33)
MCHC RBC AUTO-ENTMCNC: 34.1 G/DL (ref 31.5–35.7)
MCV RBC AUTO: 95 FL (ref 79–97)
MONOCYTES # BLD AUTO: 0.5 X10E3/UL (ref 0.1–0.9)
MONOCYTES NFR BLD AUTO: 11 %
NEUTROPHILS # BLD AUTO: 2.1 X10E3/UL (ref 1.4–7)
NEUTROPHILS NFR BLD AUTO: 50 %
PLATELET # BLD AUTO: 213 X10E3/UL (ref 150–450)
POTASSIUM SERPL-SCNC: 4.7 MMOL/L (ref 3.5–5.2)
PROT SERPL-MCNC: 6.3 G/DL (ref 6–8.5)
PSA SERPL-MCNC: 0.4 NG/ML (ref 0–4)
RBC # BLD AUTO: 4.37 X10E6/UL (ref 4.14–5.8)
SODIUM SERPL-SCNC: 140 MMOL/L (ref 134–144)
TRIGL SERPL-MCNC: 144 MG/DL (ref 0–149)
VLDLC SERPL CALC-MCNC: 25 MG/DL (ref 5–40)
WBC # BLD AUTO: 4.3 X10E3/UL (ref 3.4–10.8)

## 2021-11-18 ENCOUNTER — OFFICE VISIT (OUTPATIENT)
Dept: FAMILY MEDICINE CLINIC | Facility: CLINIC | Age: 66
End: 2021-11-18

## 2021-11-18 VITALS
OXYGEN SATURATION: 98 % | WEIGHT: 208 LBS | TEMPERATURE: 97.1 F | HEART RATE: 57 BPM | BODY MASS INDEX: 28.17 KG/M2 | HEIGHT: 72 IN | SYSTOLIC BLOOD PRESSURE: 132 MMHG | DIASTOLIC BLOOD PRESSURE: 82 MMHG

## 2021-11-18 DIAGNOSIS — M15.9 PRIMARY OSTEOARTHRITIS INVOLVING MULTIPLE JOINTS: ICD-10-CM

## 2021-11-18 DIAGNOSIS — M51.36 DEGENERATION OF INTERVERTEBRAL DISC OF LUMBAR REGION: ICD-10-CM

## 2021-11-18 DIAGNOSIS — N52.9 ERECTILE DYSFUNCTION, UNSPECIFIED ERECTILE DYSFUNCTION TYPE: ICD-10-CM

## 2021-11-18 DIAGNOSIS — I48.91 ATRIAL FIBRILLATION WITH RAPID VENTRICULAR RESPONSE (HCC): ICD-10-CM

## 2021-11-18 DIAGNOSIS — R73.02 GLUCOSE INTOLERANCE (IMPAIRED GLUCOSE TOLERANCE): ICD-10-CM

## 2021-11-18 DIAGNOSIS — R35.1 NOCTURIA: ICD-10-CM

## 2021-11-18 DIAGNOSIS — Z00.00 MEDICARE ANNUAL WELLNESS VISIT, SUBSEQUENT: Primary | ICD-10-CM

## 2021-11-18 DIAGNOSIS — I71.40 ABDOMINAL AORTIC ANEURYSM (AAA) WITHOUT RUPTURE (HCC): ICD-10-CM

## 2021-11-18 DIAGNOSIS — E78.2 MIXED HYPERLIPIDEMIA: ICD-10-CM

## 2021-11-18 DIAGNOSIS — K21.9 GASTROESOPHAGEAL REFLUX DISEASE WITHOUT ESOPHAGITIS: ICD-10-CM

## 2021-11-18 DIAGNOSIS — E55.9 VITAMIN D DEFICIENCY: ICD-10-CM

## 2021-11-18 PROCEDURE — G0439 PPPS, SUBSEQ VISIT: HCPCS | Performed by: FAMILY MEDICINE

## 2021-11-18 PROCEDURE — 1160F RVW MEDS BY RX/DR IN RCRD: CPT | Performed by: FAMILY MEDICINE

## 2021-11-18 PROCEDURE — 99213 OFFICE O/P EST LOW 20 MIN: CPT | Performed by: FAMILY MEDICINE

## 2021-11-18 PROCEDURE — 1126F AMNT PAIN NOTED NONE PRSNT: CPT | Performed by: FAMILY MEDICINE

## 2021-11-18 PROCEDURE — 1170F FXNL STATUS ASSESSED: CPT | Performed by: FAMILY MEDICINE

## 2021-11-18 RX ORDER — METOPROLOL SUCCINATE 25 MG/1
25 TABLET, EXTENDED RELEASE ORAL DAILY
Qty: 90 TABLET | Refills: 1 | Status: SHIPPED | OUTPATIENT
Start: 2021-11-18 | End: 2022-05-26 | Stop reason: SDUPTHER

## 2021-11-18 RX ORDER — MELOXICAM 15 MG/1
15 TABLET ORAL EVERY OTHER DAY
Qty: 45 TABLET | Refills: 1 | Status: SHIPPED | OUTPATIENT
Start: 2021-11-18 | End: 2022-05-26 | Stop reason: SDUPTHER

## 2021-11-18 NOTE — PROGRESS NOTES
The ABCs of the Annual Wellness Visit  Subsequent Medicare Wellness Visit    Chief Complaint   Patient presents with   • Annual Exam     Sub AWV      Subjective    History of Present Illness:  Alfred Vazquez is a 66 y.o. male who presents for a Subsequent Medicare Wellness Visit.  66-year-old white male with known history of hyperlipidemia, atrial fibrillation with abdominal aortic aneurysm here for further medical management.  Patient also with history of glucose intolerance and vitamin D deficiency.  There is also history of GERD and erectile dysfunction.  He does admit to osteoarthritis at multiple levels as well as degenerative disc disease of the lumbar spine.  Medications include meloxicam now used on a 3 times a week basis.  Patient is retired and is an avid golfer this provides relief in regards to his arthritis.  If he tries to use this product any more than 3 times a week that he develops GI issues.  Since reducing his weekly dose his GI symptoms have completely resolved.  He does use Pepcid for GERD and is on flecainide for his atrial fibrillation.  He states that he rarely has any arrhythmia at all in general notes that he is in a normal pattern.  He also uses Toprol-XL for same.  Sildenafil is used for erectile dysfunction and patient uses over-the-counter supplements and vitamins.  Fasting labs have been acquired prior to this visit.  All medications are used on a regular basis and are well-tolerated without side effects.    The following portions of the patient's history were reviewed and   updated as appropriate: allergies, current medications, past family history, past medical history, past social history, past surgical history and problem list.    Compared to one year ago, the patient feels his physical   health is the same.    Compared to one year ago, the patient feels his mental   health is the same.    Recent Hospitalizations:  He was not admitted to the hospital during the last year.        Current Medical Providers:  Patient Care Team:  Juilo Watson DO as PCP - General    Outpatient Medications Prior to Visit   Medication Sig Dispense Refill   • aspirin 81 MG chewable tablet Chew 1 tablet daily.     • CALCIUM CARBONATE-VITAMIN D PO Take  by mouth.     • Cholecalciferol (VITAMIN D3) 125 MCG (5000 UT) capsule capsule Take 5,000 Units by mouth Daily.     • famotidine (PEPCID) 10 MG tablet Take 10 mg by mouth Daily.     • flecainide (TAMBOCOR) 100 MG tablet Take 100 mg by mouth 2 (Two) Times a Day.     • PREBIOTIC PRODUCT PO Take  by mouth.     • sildenafil (REVATIO) 20 MG tablet TAKE 2-5 TABLETS BY MOUTH 1 HOUR PRIOR TO EVENT 30 tablet 5   • meloxicam (MOBIC) 15 MG tablet TAKE ONE TABLET BY MOUTH DAILY (Patient taking differently: Take 15 mg by mouth. Every other day) 90 tablet 0   • metoprolol succinate XL (TOPROL-XL) 25 MG 24 hr tablet Take 1 tablet by mouth Daily. 90 tablet 1     No facility-administered medications prior to visit.       No opioid medication identified on active medication list. I have reviewed chart for other potential  high risk medication/s and harmful drug interactions in the elderly.          Aspirin is on active medication list. Aspirin use is indicated based on review of current medical condition/s. Pros and cons of this therapy have been discussed today. Benefits of this medication outweigh potential harm.  Patient has been encouraged to continue taking this medication.  .      Patient Active Problem List   Diagnosis   • Atrial fibrillation with rapid ventricular response (HCC)   • Degeneration of intervertebral disc of lumbar region   • Gastroesophageal reflux disease   • Mixed hyperlipidemia   • Insomnia   • Osteoarthritis of multiple joints   • Seasonal allergic rhinitis   • Erectile dysfunction   • Glucose intolerance (impaired glucose tolerance)   • Onychomycosis of right great toe   • Benign nodular prostatic hyperplasia without lower urinary tract symptoms   •  "Abdominal aortic aneurysm (HCC)   • Vitamin D deficiency     Advance Care Planning  Advance Directive is not on file.  ACP discussion was held with the patient during this visit. Patient does not have an advance directive, information provided.    Review of Systems   Cardiovascular:        Atrial fibrillation, hyperlipidemia, abdominal aortic aneurysm   Gastrointestinal:        GERD   Endocrine:        Glucose intolerance, vitamin D deficiency.   Genitourinary:        Erectile dysfunction   Musculoskeletal: Positive for arthralgias and back pain.        Objective    Vitals:    11/18/21 1020   BP: 132/82   Pulse: 57   Temp: 97.1 °F (36.2 °C)   SpO2: 98%   Weight: 94.3 kg (208 lb)   Height: 182.9 cm (72\")   PainSc: 0-No pain     BMI Readings from Last 1 Encounters:   11/18/21 28.21 kg/m²   BMI is above normal parameters. Recommendations include: none (medical contraindication)    Does the patient have evidence of cognitive impairment? No    Physical Exam  Vitals and nursing note reviewed.   Constitutional:       Appearance: Normal appearance. He is well-developed and well-groomed.   HENT:      Head: Normocephalic and atraumatic.   Neck:      Thyroid: No thyroid mass or thyromegaly.      Vascular: Normal carotid pulses. No carotid bruit.      Trachea: Trachea and phonation normal.   Cardiovascular:      Rate and Rhythm: Normal rate and regular rhythm.      Heart sounds: Normal heart sounds. No murmur heard.  No friction rub. No gallop.    Pulmonary:      Effort: Pulmonary effort is normal. No respiratory distress.      Breath sounds: Normal breath sounds. No decreased breath sounds, wheezing, rhonchi or rales.   Musculoskeletal:      Cervical back: Neck supple.   Lymphadenopathy:      Cervical: No cervical adenopathy.   Skin:     General: Skin is warm and dry.      Findings: No rash.   Neurological:      Mental Status: He is alert and oriented to person, place, and time.   Psychiatric:         Attention and Perception: " Attention and perception normal.         Mood and Affect: Mood and affect normal.         Speech: Speech normal.         Behavior: Behavior normal. Behavior is cooperative.         Thought Content: Thought content normal.         Cognition and Memory: Cognition and memory normal.         Judgment: Judgment normal.       Orders Only on 11/05/2021   Component Date Value Ref Range Status   • PSA 11/08/2021 0.4  0.0 - 4.0 ng/mL Final    Comment: Roche ECLIA methodology.  According to the American Urological Association, Serum PSA should  decrease and remain at undetectable levels after radical  prostatectomy. The AUA defines biochemical recurrence as an initial  PSA value 0.2 ng/mL or greater followed by a subsequent confirmatory  PSA value 0.2 ng/mL or greater.  Values obtained with different assay methods or kits cannot be used  interchangeably. Results cannot be interpreted as absolute evidence  of the presence or absence of malignant disease.     • 25 Hydroxy, Vitamin D 11/08/2021 83.8  30.0 - 100.0 ng/mL Final    Comment: Vitamin D deficiency has been defined by the [place] of  Medicine and an Endocrine Society practice guideline as a  level of serum 25-OH vitamin D less than 20 ng/mL (1,2).  The Endocrine Society went on to further define vitamin D  insufficiency as a level between 21 and 29 ng/mL (2).  1. IOM ([place] of Medicine). 2010. Dietary reference     intakes for calcium and D. [place]: The     National Academies Press.  2. Kwadwo MF, Sherrie CACERES, Geoffrey VENEGAS, et al.     Evaluation, treatment, and prevention of vitamin D     deficiency: an Endocrine Society clinical practice     guideline. JCEM. 2011 Jul; 96(7):1911-30.     • Glucose 11/08/2021 104* 65 - 99 mg/dL Final   • BUN 11/08/2021 22  8 - 27 mg/dL Final   • Creatinine 11/08/2021 0.94  0.76 - 1.27 mg/dL Final   • eGFR Non  Am 11/08/2021 83  >59 mL/min/1.73 Final   • eGFR African Am 11/08/2021 96  >59 mL/min/1.73 Final     Comment: **In accordance with recommendations from the NKF-ASN Task force,**    [facility] is in the process of updating its eGFR calculation to the    2021 CKD-EPI creatinine equation that estimates kidney function    without a race variable.     • BUN/Creatinine Ratio 11/08/2021 23  10 - 24 Final   • Sodium 11/08/2021 140  134 - 144 mmol/L Final   • Potassium 11/08/2021 4.7  3.5 - 5.2 mmol/L Final   • Chloride 11/08/2021 103  96 - 106 mmol/L Final   • Total CO2 11/08/2021 24  20 - 29 mmol/L Final   • Calcium 11/08/2021 9.2  8.6 - 10.2 mg/dL Final   • Total Protein 11/08/2021 6.3  6.0 - 8.5 g/dL Final   • Albumin 11/08/2021 4.1  3.8 - 4.8 g/dL Final   • Globulin 11/08/2021 2.2  1.5 - 4.5 g/dL Final   • A/G Ratio 11/08/2021 1.9  1.2 - 2.2 Final   • Total Bilirubin 11/08/2021 0.6  0.0 - 1.2 mg/dL Final   • Alkaline Phosphatase 11/08/2021 58  44 - 121 IU/L Final                  **Please note reference interval change**   • AST (SGOT) 11/08/2021 24  0 - 40 IU/L Final   • ALT (SGPT) 11/08/2021 22  0 - 44 IU/L Final   • Hemoglobin A1C 11/08/2021 6.2* 4.8 - 5.6 % Final    Comment:          Prediabetes: 5.7 - 6.4           Diabetes: >6.4           Glycemic control for adults with diabetes: <7.0     • Total Cholesterol 11/08/2021 190  100 - 199 mg/dL Final   • Triglycerides 11/08/2021 144  0 - 149 mg/dL Final   • HDL Cholesterol 11/08/2021 57  >39 mg/dL Final   • VLDL Cholesterol Ronni 11/08/2021 25  5 - 40 mg/dL Final   • LDL Chol Calc (NIH) 11/08/2021 108* 0 - 99 mg/dL Final   • WBC 11/08/2021 4.3  3.4 - 10.8 x10E3/uL Final   • RBC 11/08/2021 4.37  4.14 - 5.80 x10E6/uL Final   • Hemoglobin 11/08/2021 14.1  13.0 - 17.7 g/dL Final   • Hematocrit 11/08/2021 41.4  37.5 - 51.0 % Final   • MCV 11/08/2021 95  79 - 97 fL Final   • MCH 11/08/2021 32.3  26.6 - 33.0 pg Final   • MCHC 11/08/2021 34.1  31.5 - 35.7 g/dL Final   • RDW 11/08/2021 12.0  11.6 - 15.4 % Final   • Platelets 11/08/2021 213  150 - 450 x10E3/uL Final   • Neutrophil  Rel % 2021 50  Not Estab. % Final   • Lymphocyte Rel % 2021 35  Not Estab. % Final   • Monocyte Rel % 2021 11  Not Estab. % Final   • Eosinophil Rel % 2021 3  Not Estab. % Final   • Basophil Rel % 2021 1  Not Estab. % Final   • Neutrophils Absolute 2021 2.1  1.4 - 7.0 x10E3/uL Final   • Lymphocytes Absolute 2021 1.5  0.7 - 3.1 x10E3/uL Final   • Monocytes Absolute 2021 0.5  0.1 - 0.9 x10E3/uL Final   • Eosinophils Absolute 2021 0.1  0.0 - 0.4 x10E3/uL Final   • Basophils Absolute 2021 0.1  0.0 - 0.2 x10E3/uL Final   • Immature Granulocyte Rel % 2021 0  Not Estab. % Final   • Immature Grans Absolute 2021 0.0  0.0 - 0.1 x10E3/uL Final       Lab Results   Component Value Date    CHLPL 190 2021    TRIG 144 2021    HDL 57 2021     (H) 2021    VLDL 25 2021    HGBA1C 6.2 (H) 2021            HEALTH RISK ASSESSMENT    Smoking Status:  Social History     Tobacco Use   Smoking Status Former Smoker   • Packs/day: 0.25   • Years: 2.00   • Pack years: 0.50   • Types: Cigarettes   • Quit date:    • Years since quittin.9   Smokeless Tobacco Never Used     Alcohol Consumption:  Social History     Substance and Sexual Activity   Alcohol Use Yes     Fall Risk Screen:    STEADI Fall Risk Assessment was completed, and patient is at LOW risk for falls.Assessment completed on:2021    Depression Screening:  PHQ-2/PHQ-9 Depression Screening 2021   Little interest or pleasure in doing things 0   Feeling down, depressed, or hopeless 0   Total Score 0       Health Habits and Functional and Cognitive Screening:  Functional & Cognitive Status 2021   Do you have difficulty preparing food and eating? No   Do you have difficulty bathing yourself, getting dressed or grooming yourself? No   Do you have difficulty using the toilet? No   Do you have difficulty moving around from place to place? No   Do you have  trouble with steps or getting out of a bed or a chair? No   Current Diet Unhealthy Diet   Dental Exam Up to date   Eye Exam Up to date   Exercise (times per week) 7 times per week   Current Exercises Include Walking   Current Exercise Activities Include -   Do you need help using the phone?  No   Are you deaf or do you have serious difficulty hearing?  No   Do you need help with transportation? No   Do you need help shopping? No   Do you need help preparing meals?  No   Do you need help with housework?  No   Do you need help with laundry? No   Do you need help taking your medications? No   Do you need help managing money? No   Do you ever drive or ride in a car without wearing a seat belt? No   Have you felt unusual stress, anger or loneliness in the last month? No   Who do you live with? Spouse   If you need help, do you have trouble finding someone available to you? No   Have you been bothered in the last four weeks by sexual problems? -   Do you have difficulty concentrating, remembering or making decisions? No       Age-appropriate Screening Schedule:  Refer to the list below for future screening recommendations based on patient's age, sex and/or medical conditions. Orders for these recommended tests are listed in the plan section. The patient has been provided with a written plan.    Health Maintenance   Topic Date Due   • ZOSTER VACCINE (2 of 3) 01/07/2014   • DIABETIC FOOT EXAM  11/18/2030 (Originally 11/13/2020)   • URINE MICROALBUMIN  11/18/2030 (Originally 11/13/2020)   • HEMOGLOBIN A1C  05/08/2022   • DIABETIC EYE EXAM  07/14/2022   • LIPID PANEL  11/08/2022   • TDAP/TD VACCINES (2 - Td or Tdap) 11/13/2022   • INFLUENZA VACCINE  Completed              Assessment/Plan   CMS Preventative Services Quick Reference  Risk Factors Identified During Encounter  Cardiovascular Disease  Dementia/Memory   Depression/Dysphoria  Fall Risk-High or Moderate  Hearing Problem  Immunizations Discussed/Encouraged (specific  Immunizations; Tdap and Shingrix  Inadequate Social Support, Isolation, Loneliness, Lack of Transportation, Financial Difficulties, or Caregiver Stress   Inactivity/Sedentary  The above risks/problems have been discussed with the patient.  Follow up actions/plans if indicated are seen below in the Assessment/Plan Section.  Pertinent information has been shared with the patient in the After Visit Summary.    Diagnoses and all orders for this visit:    1. Medicare annual wellness visit, subsequent (Primary)  -     Lipid panel; Future  -     Hemoglobin A1c; Future  -     Comprehensive metabolic panel; Future  -     Vitamin D 25 hydroxy; Future  -     TSH; Future  -     PSA DIAGNOSTIC ONLY; Future  -     CBC w AUTO Differential; Future    2. Mixed hyperlipidemia  -     Lipid panel; Future  -     Hemoglobin A1c; Future  -     Comprehensive metabolic panel; Future  -     Vitamin D 25 hydroxy; Future  -     TSH; Future  -     PSA DIAGNOSTIC ONLY; Future  -     CBC w AUTO Differential; Future    3. Atrial fibrillation with rapid ventricular response (HCC)  -     metoprolol succinate XL (TOPROL-XL) 25 MG 24 hr tablet; Take 1 tablet by mouth Daily.  Dispense: 90 tablet; Refill: 1  -     Lipid panel; Future  -     Hemoglobin A1c; Future  -     Comprehensive metabolic panel; Future  -     Vitamin D 25 hydroxy; Future  -     TSH; Future  -     PSA DIAGNOSTIC ONLY; Future  -     CBC w AUTO Differential; Future    4. Abdominal aortic aneurysm (AAA) without rupture (HCC)  -     Lipid panel; Future  -     Hemoglobin A1c; Future  -     Comprehensive metabolic panel; Future  -     Vitamin D 25 hydroxy; Future  -     TSH; Future  -     PSA DIAGNOSTIC ONLY; Future  -     CBC w AUTO Differential; Future    5. Glucose intolerance (impaired glucose tolerance)  -     Lipid panel; Future  -     Hemoglobin A1c; Future  -     Comprehensive metabolic panel; Future  -     Vitamin D 25 hydroxy; Future  -     TSH; Future  -     PSA DIAGNOSTIC  ONLY; Future  -     CBC w AUTO Differential; Future    6. Vitamin D deficiency  -     Lipid panel; Future  -     Hemoglobin A1c; Future  -     Comprehensive metabolic panel; Future  -     Vitamin D 25 hydroxy; Future  -     TSH; Future  -     PSA DIAGNOSTIC ONLY; Future  -     CBC w AUTO Differential; Future    7. Gastroesophageal reflux disease without esophagitis  -     Lipid panel; Future  -     Hemoglobin A1c; Future  -     Comprehensive metabolic panel; Future  -     Vitamin D 25 hydroxy; Future  -     TSH; Future  -     PSA DIAGNOSTIC ONLY; Future  -     CBC w AUTO Differential; Future    8. Erectile dysfunction, unspecified erectile dysfunction type  -     Lipid panel; Future  -     Hemoglobin A1c; Future  -     Comprehensive metabolic panel; Future  -     Vitamin D 25 hydroxy; Future  -     TSH; Future  -     PSA DIAGNOSTIC ONLY; Future  -     CBC w AUTO Differential; Future    9. Primary osteoarthritis involving multiple joints  -     meloxicam (MOBIC) 15 MG tablet; Take 1 tablet by mouth Every Other Day. Every other day  Dispense: 45 tablet; Refill: 1  -     Lipid panel; Future  -     Hemoglobin A1c; Future  -     Comprehensive metabolic panel; Future  -     Vitamin D 25 hydroxy; Future  -     TSH; Future  -     PSA DIAGNOSTIC ONLY; Future  -     CBC w AUTO Differential; Future    10. Degeneration of intervertebral disc of lumbar region  -     Lipid panel; Future  -     Hemoglobin A1c; Future  -     Comprehensive metabolic panel; Future  -     Vitamin D 25 hydroxy; Future  -     TSH; Future  -     PSA DIAGNOSTIC ONLY; Future  -     CBC w AUTO Differential; Future    11. Nocturia  -     PSA DIAGNOSTIC ONLY; Future        Follow Up:   Return in about 6 months (around 5/18/2022) for Recheck.     An After Visit Summary and PPPS were made available to the patient.        I spent 30 minutes caring for Alfred on this date of service. This time includes time spent by me in the following activities:preparing for the  visit, reviewing tests, obtaining and/or reviewing a separately obtained history, performing a medically appropriate examination and/or evaluation , counseling and educating the patient/family/caregiver, ordering medications, tests, or procedures, documenting information in the medical record, independently interpreting results and communicating that information with the patient/family/caregiver and care coordination

## 2021-12-01 RX ORDER — SILDENAFIL CITRATE 20 MG/1
TABLET ORAL
Qty: 20 TABLET | Refills: 2 | Status: SHIPPED | OUTPATIENT
Start: 2021-12-01 | End: 2022-08-30

## 2021-12-27 ENCOUNTER — OFFICE VISIT (OUTPATIENT)
Dept: FAMILY MEDICINE CLINIC | Facility: CLINIC | Age: 66
End: 2021-12-27

## 2021-12-27 VITALS
BODY MASS INDEX: 28.21 KG/M2 | SYSTOLIC BLOOD PRESSURE: 130 MMHG | OXYGEN SATURATION: 98 % | TEMPERATURE: 98.7 F | DIASTOLIC BLOOD PRESSURE: 86 MMHG | HEART RATE: 62 BPM | RESPIRATION RATE: 16 BRPM | HEIGHT: 72 IN

## 2021-12-27 DIAGNOSIS — J32.9 SINUSITIS, UNSPECIFIED CHRONICITY, UNSPECIFIED LOCATION: Primary | ICD-10-CM

## 2021-12-27 PROCEDURE — 99213 OFFICE O/P EST LOW 20 MIN: CPT | Performed by: NURSE PRACTITIONER

## 2021-12-27 RX ORDER — AMOXICILLIN 500 MG/1
1000 CAPSULE ORAL 3 TIMES DAILY
Qty: 42 CAPSULE | Refills: 0 | Status: SHIPPED | OUTPATIENT
Start: 2021-12-27 | End: 2022-01-03

## 2021-12-27 RX ORDER — FLUTICASONE PROPIONATE 50 MCG
2 SPRAY, SUSPENSION (ML) NASAL DAILY
Qty: 16 G | Refills: 0 | Status: SHIPPED | OUTPATIENT
Start: 2021-12-27

## 2021-12-27 NOTE — PROGRESS NOTES
"Chief Complaint   Patient presents with   • Nasal Congestion   • Cough   • Earache       Upper Respiratory Infection: Patient complains of possible sinusitis. Symptoms include bilateral ear pain, congestion and cough. Onset of symptoms was 4 days ago, gradually worsening since that time. He also c/o achiness, congestion, no  fever, non productive cough and sinus pressure for the past 4 days .  He is drinking moderate amounts of fluids. Evaluation to date: none. Treatment to date: cough suppressants and decongestants.  Ill contacts at home or school or work discussed.     Administered On    COVID-19 mRNA (PFR) (COVID-19 (PFIZER)) 10/11/2021, 4/9/2021, 3/19/2021      Flu vaccine on 10/7/2021    The following portions of the patient's history were reviewed and updated as appropriate: allergies, current medications, past family history, past medical history, past social history, past surgical history and problem list.    Vitals:    12/27/21 1253   BP: 130/86   BP Location: Left arm   Patient Position: Sitting   Cuff Size: Adult   Pulse: 62   Resp: 16   Temp: 98.7 °F (37.1 °C)   SpO2: 98%   Height: 182.9 cm (72\")     Gen: Mildly ill appearing, alert  Head:  Frontal sinus tenderness  Ears: TM's bulging without redness  Nose:  Congestion  Throat:  Red without exudate, some drainage  Neck: No LAD  Lung: Good air movement, regular RR  Heart: RR without murmur  Skin: No rash      Assessment/Plan   Diagnoses and all orders for this visit:    1. Sinusitis, unspecified chronicity, unspecified location (Primary)    Other orders  -     fluticasone (Flonase) 50 MCG/ACT nasal spray; 2 sprays into the nostril(s) as directed by provider Daily.  Dispense: 16 g; Refill: 0  -     amoxicillin (AMOXIL) 500 MG capsule; Take 2 capsules by mouth 3 (Three) Times a Day for 7 days.  Dispense: 42 capsule; Refill: 0             Patient Instructions   Sinusitis, Adult  Sinusitis is soreness and swelling (inflammation) of your sinuses. Sinuses are " hollow spaces in the bones around your face. They are located:  · Around your eyes.  · In the middle of your forehead.  · Behind your nose.  · In your cheekbones.  Your sinuses and nasal passages are lined with a fluid called mucus. Mucus drains out of your sinuses. Swelling can trap mucus in your sinuses. This lets germs (bacteria, virus, or fungus) grow, which leads to infection. Most of the time, this condition is caused by a virus.  What are the causes?  This condition is caused by:  · Allergies.  · Asthma.  · Germs.  · Things that block your nose or sinuses.  · Growths in the nose (nasal polyps).  · Chemicals or irritants in the air.  · Fungus (rare).  What increases the risk?  You are more likely to develop this condition if:  · You have a weak body defense system (immune system).  · You do a lot of swimming or diving.  · You use nasal sprays too much.  · You smoke.  What are the signs or symptoms?  The main symptoms of this condition are pain and a feeling of pressure around the sinuses. Other symptoms include:  · Stuffy nose (congestion).  · Runny nose (drainage).  · Swelling and warmth in the sinuses.  · Headache.  · Toothache.  · A cough that may get worse at night.  · Mucus that collects in the throat or the back of the nose (postnasal drip).  · Being unable to smell and taste.  · Being very tired (fatigue).  · A fever.  · Sore throat.  · Bad breath.  How is this diagnosed?  This condition is diagnosed based on:  · Your symptoms.  · Your medical history.  · A physical exam.  · Tests to find out if your condition is short-term (acute) or long-term (chronic). Your doctor may:  ? Check your nose for growths (polyps).  ? Check your sinuses using a tool that has a light (endoscope).  ? Check for allergies or germs.  ? Do imaging tests, such as an MRI or CT scan.  How is this treated?  Treatment for this condition depends on the cause and whether it is short-term or long-term.  · If caused by a virus, your  symptoms should go away on their own within 10 days. You may be given medicines to relieve symptoms. They include:  ? Medicines that shrink swollen tissue in the nose.  ? Medicines that treat allergies (antihistamines).  ? A spray that treats swelling of the nostrils.   ? Rinses that help get rid of thick mucus in your nose (nasal saline washes).  · If caused by bacteria, your doctor may wait to see if you will get better without treatment. You may be given antibiotic medicine if you have:  ? A very bad infection.  ? A weak body defense system.  · If caused by growths in the nose, you may need to have surgery.  Follow these instructions at home:  Medicines  · Take, use, or apply over-the-counter and prescription medicines only as told by your doctor. These may include nasal sprays.  · If you were prescribed an antibiotic medicine, take it as told by your doctor. Do not stop taking the antibiotic even if you start to feel better.  Hydrate and humidify    · Drink enough water to keep your pee (urine) pale yellow.  · Use a cool mist humidifier to keep the humidity level in your home above 50%.  · Breathe in steam for 10-15 minutes, 3-4 times a day, or as told by your doctor. You can do this in the bathroom while a hot shower is running.  · Try not to spend time in cool or dry air.    Rest  · Rest as much as you can.  · Sleep with your head raised (elevated).  · Make sure you get enough sleep each night.  General instructions    · Put a warm, moist washcloth on your face 3-4 times a day, or as often as told by your doctor. This will help with discomfort.  · Wash your hands often with soap and water. If there is no soap and water, use hand .  · Do not smoke. Avoid being around people who are smoking (secondhand smoke).  · Keep all follow-up visits as told by your doctor. This is important.    Contact a doctor if:  · You have a fever.  · Your symptoms get worse.  · Your symptoms do not get better within 10  days.  Get help right away if:  · You have a very bad headache.  · You cannot stop throwing up (vomiting).  · You have very bad pain or swelling around your face or eyes.  · You have trouble seeing.  · You feel confused.  · Your neck is stiff.  · You have trouble breathing.  Summary  · Sinusitis is swelling of your sinuses. Sinuses are hollow spaces in the bones around your face.  · This condition is caused by tissues in your nose that become inflamed or swollen. This traps germs. These can lead to infection.  · If you were prescribed an antibiotic medicine, take it as told by your doctor. Do not stop taking it even if you start to feel better.  · Keep all follow-up visits as told by your doctor. This is important.  This information is not intended to replace advice given to you by your health care provider. Make sure you discuss any questions you have with your health care provider.  Document Revised: 05/20/2019 Document Reviewed: 05/20/2019  [email or web address] Patient Education © 2021 [email or web address]        Tylenol or Advil as needed for pain, fever, muscle aches  Plenty of fluids  Hand washing discussed  Off work or school note given if needed.  Warm tea for throat.  Pros and cons of antibiotic use discussed.  Instructed to notify us if symptoms worsen or do not improve.        Patient was wearing face mask when I entered the room and throughout our encounter. Protective equipment was worn throughout this patient encounter including a face mask, eye protection, and gloves. Hand hygiene was performed before donning protective equipment and after removal when leaving the room.     TONY Lovelace  Family Practice  [facility] [place]

## 2021-12-27 NOTE — PATIENT INSTRUCTIONS
Sinusitis, Adult  Sinusitis is soreness and swelling (inflammation) of your sinuses. Sinuses are hollow spaces in the bones around your face. They are located:  · Around your eyes.  · In the middle of your forehead.  · Behind your nose.  · In your cheekbones.  Your sinuses and nasal passages are lined with a fluid called mucus. Mucus drains out of your sinuses. Swelling can trap mucus in your sinuses. This lets germs (bacteria, virus, or fungus) grow, which leads to infection. Most of the time, this condition is caused by a virus.  What are the causes?  This condition is caused by:  · Allergies.  · Asthma.  · Germs.  · Things that block your nose or sinuses.  · Growths in the nose (nasal polyps).  · Chemicals or irritants in the air.  · Fungus (rare).  What increases the risk?  You are more likely to develop this condition if:  · You have a weak body defense system (immune system).  · You do a lot of swimming or diving.  · You use nasal sprays too much.  · You smoke.  What are the signs or symptoms?  The main symptoms of this condition are pain and a feeling of pressure around the sinuses. Other symptoms include:  · Stuffy nose (congestion).  · Runny nose (drainage).  · Swelling and warmth in the sinuses.  · Headache.  · Toothache.  · A cough that may get worse at night.  · Mucus that collects in the throat or the back of the nose (postnasal drip).  · Being unable to smell and taste.  · Being very tired (fatigue).  · A fever.  · Sore throat.  · Bad breath.  How is this diagnosed?  This condition is diagnosed based on:  · Your symptoms.  · Your medical history.  · A physical exam.  · Tests to find out if your condition is short-term (acute) or long-term (chronic). Your doctor may:  ? Check your nose for growths (polyps).  ? Check your sinuses using a tool that has a light (endoscope).  ? Check for allergies or germs.  ? Do imaging tests, such as an MRI or CT scan.  How is this treated?  Treatment for this condition  depends on the cause and whether it is short-term or long-term.  · If caused by a virus, your symptoms should go away on their own within 10 days. You may be given medicines to relieve symptoms. They include:  ? Medicines that shrink swollen tissue in the nose.  ? Medicines that treat allergies (antihistamines).  ? A spray that treats swelling of the nostrils.   ? Rinses that help get rid of thick mucus in your nose (nasal saline washes).  · If caused by bacteria, your doctor may wait to see if you will get better without treatment. You may be given antibiotic medicine if you have:  ? A very bad infection.  ? A weak body defense system.  · If caused by growths in the nose, you may need to have surgery.  Follow these instructions at home:  Medicines  · Take, use, or apply over-the-counter and prescription medicines only as told by your doctor. These may include nasal sprays.  · If you were prescribed an antibiotic medicine, take it as told by your doctor. Do not stop taking the antibiotic even if you start to feel better.  Hydrate and humidify    · Drink enough water to keep your pee (urine) pale yellow.  · Use a cool mist humidifier to keep the humidity level in your home above 50%.  · Breathe in steam for 10-15 minutes, 3-4 times a day, or as told by your doctor. You can do this in the bathroom while a hot shower is running.  · Try not to spend time in cool or dry air.    Rest  · Rest as much as you can.  · Sleep with your head raised (elevated).  · Make sure you get enough sleep each night.  General instructions    · Put a warm, moist washcloth on your face 3-4 times a day, or as often as told by your doctor. This will help with discomfort.  · Wash your hands often with soap and water. If there is no soap and water, use hand .  · Do not smoke. Avoid being around people who are smoking (secondhand smoke).  · Keep all follow-up visits as told by your doctor. This is important.    Contact a doctor if:  · You  have a fever.  · Your symptoms get worse.  · Your symptoms do not get better within 10 days.  Get help right away if:  · You have a very bad headache.  · You cannot stop throwing up (vomiting).  · You have very bad pain or swelling around your face or eyes.  · You have trouble seeing.  · You feel confused.  · Your neck is stiff.  · You have trouble breathing.  Summary  · Sinusitis is swelling of your sinuses. Sinuses are hollow spaces in the bones around your face.  · This condition is caused by tissues in your nose that become inflamed or swollen. This traps germs. These can lead to infection.  · If you were prescribed an antibiotic medicine, take it as told by your doctor. Do not stop taking it even if you start to feel better.  · Keep all follow-up visits as told by your doctor. This is important.  This information is not intended to replace advice given to you by your health care provider. Make sure you discuss any questions you have with your health care provider.  Document Revised: 05/20/2019 Document Reviewed: 05/20/2019  [employer] Patient Education © 2021 [employer]

## 2022-05-18 DIAGNOSIS — K21.9 GASTROESOPHAGEAL REFLUX DISEASE WITHOUT ESOPHAGITIS: ICD-10-CM

## 2022-05-18 DIAGNOSIS — R35.1 NOCTURIA: ICD-10-CM

## 2022-05-18 DIAGNOSIS — N52.9 ERECTILE DYSFUNCTION, UNSPECIFIED ERECTILE DYSFUNCTION TYPE: ICD-10-CM

## 2022-05-18 DIAGNOSIS — R73.02 GLUCOSE INTOLERANCE (IMPAIRED GLUCOSE TOLERANCE): ICD-10-CM

## 2022-05-18 DIAGNOSIS — Z00.00 MEDICARE ANNUAL WELLNESS VISIT, SUBSEQUENT: ICD-10-CM

## 2022-05-18 DIAGNOSIS — M15.9 PRIMARY OSTEOARTHRITIS INVOLVING MULTIPLE JOINTS: ICD-10-CM

## 2022-05-18 DIAGNOSIS — I71.40 ABDOMINAL AORTIC ANEURYSM (AAA) WITHOUT RUPTURE: ICD-10-CM

## 2022-05-18 DIAGNOSIS — M51.36 DEGENERATION OF INTERVERTEBRAL DISC OF LUMBAR REGION: ICD-10-CM

## 2022-05-18 DIAGNOSIS — E78.2 MIXED HYPERLIPIDEMIA: ICD-10-CM

## 2022-05-18 DIAGNOSIS — E55.9 VITAMIN D DEFICIENCY: ICD-10-CM

## 2022-05-18 DIAGNOSIS — I48.91 ATRIAL FIBRILLATION WITH RAPID VENTRICULAR RESPONSE: ICD-10-CM

## 2022-05-20 LAB
25(OH)D3+25(OH)D2 SERPL-MCNC: 71.8 NG/ML (ref 30–100)
ALBUMIN SERPL-MCNC: 4.1 G/DL (ref 3.8–4.8)
ALBUMIN/GLOB SERPL: 2.1 {RATIO} (ref 1.2–2.2)
ALP SERPL-CCNC: 60 IU/L (ref 44–121)
ALT SERPL-CCNC: 22 IU/L (ref 0–44)
AST SERPL-CCNC: 16 IU/L (ref 0–40)
BASOPHILS # BLD AUTO: 0 X10E3/UL (ref 0–0.2)
BASOPHILS NFR BLD AUTO: 1 %
BILIRUB SERPL-MCNC: 0.5 MG/DL (ref 0–1.2)
BUN SERPL-MCNC: 30 MG/DL (ref 8–27)
BUN/CREAT SERPL: 30 (ref 10–24)
CALCIUM SERPL-MCNC: 9.2 MG/DL (ref 8.6–10.2)
CHLORIDE SERPL-SCNC: 103 MMOL/L (ref 96–106)
CHOLEST SERPL-MCNC: 219 MG/DL (ref 100–199)
CO2 SERPL-SCNC: 23 MMOL/L (ref 20–29)
CREAT SERPL-MCNC: 0.99 MG/DL (ref 0.76–1.27)
EGFRCR SERPLBLD CKD-EPI 2021: 83 ML/MIN/1.73
EOSINOPHIL # BLD AUTO: 0.2 X10E3/UL (ref 0–0.4)
EOSINOPHIL NFR BLD AUTO: 4 %
ERYTHROCYTE [DISTWIDTH] IN BLOOD BY AUTOMATED COUNT: 12.5 % (ref 11.6–15.4)
GLOBULIN SER CALC-MCNC: 2 G/DL (ref 1.5–4.5)
GLUCOSE SERPL-MCNC: 114 MG/DL (ref 65–99)
HBA1C MFR BLD: 6.1 % (ref 4.8–5.6)
HCT VFR BLD AUTO: 44.7 % (ref 37.5–51)
HDLC SERPL-MCNC: 56 MG/DL
HGB BLD-MCNC: 14.7 G/DL (ref 13–17.7)
IMM GRANULOCYTES # BLD AUTO: 0 X10E3/UL (ref 0–0.1)
IMM GRANULOCYTES NFR BLD AUTO: 0 %
LDLC SERPL CALC-MCNC: 142 MG/DL (ref 0–99)
LYMPHOCYTES # BLD AUTO: 1.2 X10E3/UL (ref 0.7–3.1)
LYMPHOCYTES NFR BLD AUTO: 29 %
MCH RBC QN AUTO: 31.6 PG (ref 26.6–33)
MCHC RBC AUTO-ENTMCNC: 32.9 G/DL (ref 31.5–35.7)
MCV RBC AUTO: 96 FL (ref 79–97)
MONOCYTES # BLD AUTO: 0.6 X10E3/UL (ref 0.1–0.9)
MONOCYTES NFR BLD AUTO: 13 %
NEUTROPHILS # BLD AUTO: 2.3 X10E3/UL (ref 1.4–7)
NEUTROPHILS NFR BLD AUTO: 53 %
PLATELET # BLD AUTO: 198 X10E3/UL (ref 150–450)
POTASSIUM SERPL-SCNC: 4.6 MMOL/L (ref 3.5–5.2)
PROT SERPL-MCNC: 6.1 G/DL (ref 6–8.5)
PSA SERPL-MCNC: 0.3 NG/ML (ref 0–4)
RBC # BLD AUTO: 4.65 X10E6/UL (ref 4.14–5.8)
SODIUM SERPL-SCNC: 140 MMOL/L (ref 134–144)
TRIGL SERPL-MCNC: 119 MG/DL (ref 0–149)
TSH SERPL DL<=0.005 MIU/L-ACNC: 1.34 UIU/ML (ref 0.45–4.5)
VLDLC SERPL CALC-MCNC: 21 MG/DL (ref 5–40)
WBC # BLD AUTO: 4.3 X10E3/UL (ref 3.4–10.8)

## 2022-05-26 ENCOUNTER — OFFICE VISIT (OUTPATIENT)
Dept: FAMILY MEDICINE CLINIC | Facility: CLINIC | Age: 67
End: 2022-05-26

## 2022-05-26 VITALS
DIASTOLIC BLOOD PRESSURE: 82 MMHG | HEART RATE: 61 BPM | OXYGEN SATURATION: 95 % | SYSTOLIC BLOOD PRESSURE: 122 MMHG | WEIGHT: 211 LBS | TEMPERATURE: 97.5 F | BODY MASS INDEX: 28.58 KG/M2 | HEIGHT: 72 IN

## 2022-05-26 DIAGNOSIS — E55.9 VITAMIN D DEFICIENCY: ICD-10-CM

## 2022-05-26 DIAGNOSIS — I10 ESSENTIAL HYPERTENSION: Primary | ICD-10-CM

## 2022-05-26 DIAGNOSIS — M15.9 PRIMARY OSTEOARTHRITIS INVOLVING MULTIPLE JOINTS: ICD-10-CM

## 2022-05-26 DIAGNOSIS — E78.2 MIXED HYPERLIPIDEMIA: ICD-10-CM

## 2022-05-26 DIAGNOSIS — K21.9 GASTROESOPHAGEAL REFLUX DISEASE WITHOUT ESOPHAGITIS: ICD-10-CM

## 2022-05-26 DIAGNOSIS — I48.91 ATRIAL FIBRILLATION WITH RAPID VENTRICULAR RESPONSE: ICD-10-CM

## 2022-05-26 DIAGNOSIS — R73.02 GLUCOSE INTOLERANCE (IMPAIRED GLUCOSE TOLERANCE): ICD-10-CM

## 2022-05-26 PROCEDURE — 99213 OFFICE O/P EST LOW 20 MIN: CPT | Performed by: FAMILY MEDICINE

## 2022-05-26 RX ORDER — METOPROLOL SUCCINATE 25 MG/1
25 TABLET, EXTENDED RELEASE ORAL DAILY
Qty: 90 TABLET | Refills: 1 | Status: SHIPPED | OUTPATIENT
Start: 2022-05-26 | End: 2022-11-21 | Stop reason: SDUPTHER

## 2022-05-26 RX ORDER — MELOXICAM 15 MG/1
15 TABLET ORAL EVERY OTHER DAY
Qty: 45 TABLET | Refills: 1 | Status: SHIPPED | OUTPATIENT
Start: 2022-05-26 | End: 2022-11-21 | Stop reason: SDUPTHER

## 2022-05-26 NOTE — PROGRESS NOTES
Subjective   Alfred Vazquez is a 66 y.o. male with   Chief Complaint   Patient presents with   • Hypertension     Lab follow up   .    History of Present Illness   66-year-old white male with known history of hypertension, hyperlipidemia as well as atrial fibrillation with rapid ventricular response.  Current medications include 81 mg aspirin daily as well as famotidine, flecainide, Flonase, meloxicam, Toprol-XL and sildenafil.  All medications are used appropriately and her well-tolerated without side effects.  Fasting labs have been acquired prior to this visit.  In general patient continues to do well playing golf 5 days a week in his intermediate.  The following portions of the patient's history were reviewed and updated as appropriate: allergies, current medications, past family history, past medical history, past social history, past surgical history and problem list.    Review of Systems   Cardiovascular:        Hypertension, hyperlipidemia, atrial fibrillation   Gastrointestinal:        GERD   Musculoskeletal: Positive for arthralgias.       Objective     Vitals:    05/26/22 0802   BP: 122/82   Pulse: 61   Temp: 97.5 °F (36.4 °C)   SpO2: 95%       Recent Results (from the past 672 hour(s))   CBC w AUTO Differential    Collection Time: 05/19/22  8:07 AM    Specimen: Blood   Result Value Ref Range    WBC 4.3 3.4 - 10.8 x10E3/uL    RBC 4.65 4.14 - 5.80 x10E6/uL    Hemoglobin 14.7 13.0 - 17.7 g/dL    Hematocrit 44.7 37.5 - 51.0 %    MCV 96 79 - 97 fL    MCH 31.6 26.6 - 33.0 pg    MCHC 32.9 31.5 - 35.7 g/dL    RDW 12.5 11.6 - 15.4 %    Platelets 198 150 - 450 x10E3/uL    Neutrophil Rel % 53 Not Estab. %    Lymphocyte Rel % 29 Not Estab. %    Monocyte Rel % 13 Not Estab. %    Eosinophil Rel % 4 Not Estab. %    Basophil Rel % 1 Not Estab. %    Neutrophils Absolute 2.3 1.4 - 7.0 x10E3/uL    Lymphocytes Absolute 1.2 0.7 - 3.1 x10E3/uL    Monocytes Absolute 0.6 0.1 - 0.9 x10E3/uL    Eosinophils Absolute 0.2 0.0 - 0.4  x10E3/uL    Basophils Absolute 0.0 0.0 - 0.2 x10E3/uL    Immature Granulocyte Rel % 0 Not Estab. %    Immature Grans Absolute 0.0 0.0 - 0.1 x10E3/uL   PSA DIAGNOSTIC ONLY    Collection Time: 05/19/22  8:07 AM    Specimen: Blood   Result Value Ref Range    PSA 0.3 0.0 - 4.0 ng/mL   TSH    Collection Time: 05/19/22  8:07 AM    Specimen: Blood   Result Value Ref Range    TSH 1.340 0.450 - 4.500 uIU/mL   Vitamin D 25 hydroxy    Collection Time: 05/19/22  8:07 AM    Specimen: Blood   Result Value Ref Range    25 Hydroxy, Vitamin D 71.8 30.0 - 100.0 ng/mL   Comprehensive metabolic panel    Collection Time: 05/19/22  8:07 AM    Specimen: Blood   Result Value Ref Range    Glucose 114 (H) 65 - 99 mg/dL    BUN 30 (H) 8 - 27 mg/dL    Creatinine 0.99 0.76 - 1.27 mg/dL    EGFR Result 83 >59 mL/min/1.73    BUN/Creatinine Ratio 30 (H) 10 - 24    Sodium 140 134 - 144 mmol/L    Potassium 4.6 3.5 - 5.2 mmol/L    Chloride 103 96 - 106 mmol/L    Total CO2 23 20 - 29 mmol/L    Calcium 9.2 8.6 - 10.2 mg/dL    Total Protein 6.1 6.0 - 8.5 g/dL    Albumin 4.1 3.8 - 4.8 g/dL    Globulin 2.0 1.5 - 4.5 g/dL    A/G Ratio 2.1 1.2 - 2.2    Total Bilirubin 0.5 0.0 - 1.2 mg/dL    Alkaline Phosphatase 60 44 - 121 IU/L    AST (SGOT) 16 0 - 40 IU/L    ALT (SGPT) 22 0 - 44 IU/L   Hemoglobin A1c    Collection Time: 05/19/22  8:07 AM    Specimen: Blood   Result Value Ref Range    Hemoglobin A1C 6.1 (H) 4.8 - 5.6 %   Lipid panel    Collection Time: 05/19/22  8:07 AM    Specimen: Blood   Result Value Ref Range    Total Cholesterol 219 (H) 100 - 199 mg/dL    Triglycerides 119 0 - 149 mg/dL    HDL Cholesterol 56 >39 mg/dL    VLDL Cholesterol Ronni 21 5 - 40 mg/dL    LDL Chol Calc (NIH) 142 (H) 0 - 99 mg/dL       Physical Exam  Vitals and nursing note reviewed.   Constitutional:       Appearance: Normal appearance. He is well-developed and well-groomed.   HENT:      Head: Normocephalic and atraumatic.   Neck:      Thyroid: No thyroid mass or thyromegaly.       Vascular: Normal carotid pulses. No carotid bruit.      Trachea: Trachea and phonation normal.   Cardiovascular:      Rate and Rhythm: Normal rate and regular rhythm.      Heart sounds: Normal heart sounds. No murmur heard.    No friction rub. No gallop.   Pulmonary:      Effort: Pulmonary effort is normal. No respiratory distress.      Breath sounds: Normal breath sounds. No decreased breath sounds, wheezing, rhonchi or rales.   Musculoskeletal:      Cervical back: Neck supple.   Lymphadenopathy:      Cervical: No cervical adenopathy.   Skin:     General: Skin is warm and dry.      Findings: No rash.   Neurological:      Mental Status: He is alert and oriented to person, place, and time.   Psychiatric:         Attention and Perception: Attention and perception normal.         Mood and Affect: Mood and affect normal.         Speech: Speech normal.         Behavior: Behavior normal. Behavior is cooperative.         Thought Content: Thought content normal.         Cognition and Memory: Cognition and memory normal.         Judgment: Judgment normal.         Assessment & Plan   Diagnoses and all orders for this visit:    1. Essential hypertension (Primary)  -     Lipid panel; Future  -     Hemoglobin A1c; Future  -     Comprehensive metabolic panel; Future  -     Vitamin D 25 hydroxy; Future  -     TSH; Future  -     CBC w AUTO Differential; Future    2. Mixed hyperlipidemia  -     Lipid panel; Future  -     Hemoglobin A1c; Future  -     Comprehensive metabolic panel; Future  -     Vitamin D 25 hydroxy; Future  -     TSH; Future  -     CBC w AUTO Differential; Future    3. Atrial fibrillation with rapid ventricular response (HCC)  -     metoprolol succinate XL (TOPROL-XL) 25 MG 24 hr tablet; Take 1 tablet by mouth Daily.  Dispense: 90 tablet; Refill: 1  -     Lipid panel; Future  -     Hemoglobin A1c; Future  -     Comprehensive metabolic panel; Future  -     Vitamin D 25 hydroxy; Future  -     TSH; Future  -     CBC w  AUTO Differential; Future    4. Glucose intolerance (impaired glucose tolerance)  -     Lipid panel; Future  -     Hemoglobin A1c; Future  -     Comprehensive metabolic panel; Future  -     Vitamin D 25 hydroxy; Future  -     TSH; Future  -     CBC w AUTO Differential; Future    5. Vitamin D deficiency  -     Lipid panel; Future  -     Hemoglobin A1c; Future  -     Comprehensive metabolic panel; Future  -     Vitamin D 25 hydroxy; Future  -     TSH; Future  -     CBC w AUTO Differential; Future    6. Gastroesophageal reflux disease without esophagitis  -     Lipid panel; Future  -     Hemoglobin A1c; Future  -     Comprehensive metabolic panel; Future  -     Vitamin D 25 hydroxy; Future  -     TSH; Future  -     CBC w AUTO Differential; Future    7. Primary osteoarthritis involving multiple joints  -     meloxicam (MOBIC) 15 MG tablet; Take 1 tablet by mouth Every Other Day. Every other day  Dispense: 45 tablet; Refill: 1  -     Lipid panel; Future  -     Hemoglobin A1c; Future  -     Comprehensive metabolic panel; Future  -     Vitamin D 25 hydroxy; Future  -     TSH; Future  -     CBC w AUTO Differential; Future    Low-cholesterol dietary handout was given.  Patient has been instructed to lower the cholesterol in his diet with an LDL goal of 100 or less.  We will recheck fasting labs in 6 months and patient is not to goal we will discussed possible medications.    Return in about 6 months (around 11/26/2022) for Recheck.

## 2022-08-30 RX ORDER — SILDENAFIL CITRATE 20 MG/1
TABLET ORAL
Qty: 20 TABLET | Refills: 2 | Status: SHIPPED | OUTPATIENT
Start: 2022-08-30

## 2022-11-10 ENCOUNTER — OFFICE VISIT (OUTPATIENT)
Dept: FAMILY MEDICINE CLINIC | Facility: CLINIC | Age: 67
End: 2022-11-10

## 2022-11-10 VITALS — TEMPERATURE: 98.4 F

## 2022-11-10 DIAGNOSIS — J06.9 UPPER RESPIRATORY TRACT INFECTION, UNSPECIFIED TYPE: Primary | ICD-10-CM

## 2022-11-10 DIAGNOSIS — R50.9 FEVER, UNSPECIFIED FEVER CAUSE: ICD-10-CM

## 2022-11-10 LAB
EXPIRATION DATE: NORMAL
FLUAV AG NPH QL: NEGATIVE
FLUBV AG NPH QL: NEGATIVE
INTERNAL CONTROL: NORMAL
Lab: NORMAL

## 2022-11-10 PROCEDURE — 87804 INFLUENZA ASSAY W/OPTIC: CPT | Performed by: FAMILY MEDICINE

## 2022-11-10 PROCEDURE — 99213 OFFICE O/P EST LOW 20 MIN: CPT | Performed by: FAMILY MEDICINE

## 2022-11-10 RX ORDER — DEXTROMETHORPHAN HYDROBROMIDE AND PROMETHAZINE HYDROCHLORIDE 15; 6.25 MG/5ML; MG/5ML
5 SYRUP ORAL 4 TIMES DAILY PRN
Qty: 120 ML | Refills: 0 | Status: SHIPPED | OUTPATIENT
Start: 2022-11-10 | End: 2022-11-21

## 2022-11-11 NOTE — PROGRESS NOTES
"Subjective   Alfred Vazquez is a 67 y.o. male with   Chief Complaint   Patient presents with   • Sore Throat     Since Tuesday, throat has improved sligtly   • Earache     bilateral   • Nasal Congestion     \"Nose running like a faucet\"   .    History of Present Illness   67-year-old white male with 2-day history of increased congestion, postnasal drip with nonproductive cough.  Ears are uncomfortable bilaterally.  He states that his nose is running like a faucet and for the most part it is clear.  He has also had myalgias.  He has tested with COVID testing at home and found to be negative.  Patient denies fever.  The following portions of the patient's history were reviewed and updated as appropriate: allergies, current medications, past family history, past medical history, past social history, past surgical history and problem list.    Review of Systems   Constitutional: Positive for fatigue. Negative for fever.   HENT: Positive for congestion, postnasal drip and rhinorrhea.    Respiratory: Positive for cough. Negative for shortness of breath and wheezing.    Cardiovascular: Negative for chest pain.       Objective     Vitals:    11/10/22 1621   Temp: 98.4 °F (36.9 °C)       Recent Results (from the past 672 hour(s))   POC Influenza A / B    Collection Time: 11/10/22  5:09 PM    Specimen: Swab   Result Value Ref Range    Rapid Influenza A Ag Negative Negative    Rapid Influenza B Ag Negative Negative    Internal Control Passed Passed    Lot Number [id number]     Expiration Date 12/09/2022        Physical Exam  Vitals and nursing note reviewed.   Constitutional:       Appearance: Normal appearance. He is well-developed and well-groomed.   HENT:      Head: Normocephalic and atraumatic.      Right Ear: Hearing, tympanic membrane, ear canal and external ear normal.      Left Ear: Hearing, tympanic membrane, ear canal and external ear normal.      Nose: Nose normal.      Comments: Clear rhinorrhea     Mouth/Throat:      " Lips: Pink.      Mouth: Mucous membranes are moist.      Pharynx: Oropharynx is clear. Uvula midline.   Neck:      Thyroid: No thyroid mass or thyromegaly.      Vascular: Normal carotid pulses. No carotid bruit.      Trachea: Trachea and phonation normal.   Cardiovascular:      Rate and Rhythm: Normal rate and regular rhythm.      Heart sounds: Normal heart sounds. No murmur heard.    No friction rub. No gallop.   Pulmonary:      Effort: Pulmonary effort is normal. No respiratory distress.      Breath sounds: Normal breath sounds. No decreased breath sounds, wheezing, rhonchi or rales.   Musculoskeletal:      Cervical back: Neck supple.   Lymphadenopathy:      Cervical: No cervical adenopathy.   Skin:     General: Skin is warm and dry.      Findings: No rash.   Neurological:      Mental Status: He is alert and oriented to person, place, and time.   Psychiatric:         Attention and Perception: Attention and perception normal.         Mood and Affect: Mood and affect normal.         Speech: Speech normal.         Behavior: Behavior normal. Behavior is cooperative.         Thought Content: Thought content normal.         Cognition and Memory: Cognition and memory normal.         Judgment: Judgment normal.         Assessment & Plan   Diagnoses and all orders for this visit:    1. Upper respiratory tract infection, unspecified type (Primary)  -     promethazine-dextromethorphan (PROMETHAZINE-DM) 6.25-15 MG/5ML syrup; Take 5 mL by mouth 4 (Four) Times a Day As Needed for Cough.  Dispense: 120 mL; Refill: 0    2. Fever, unspecified fever cause  -     POC Influenza A / B        Return if symptoms worsen or fail to improve.

## 2022-11-14 DIAGNOSIS — M15.9 PRIMARY OSTEOARTHRITIS INVOLVING MULTIPLE JOINTS: ICD-10-CM

## 2022-11-14 DIAGNOSIS — E55.9 VITAMIN D DEFICIENCY: ICD-10-CM

## 2022-11-14 DIAGNOSIS — R73.02 GLUCOSE INTOLERANCE (IMPAIRED GLUCOSE TOLERANCE): ICD-10-CM

## 2022-11-14 DIAGNOSIS — E78.2 MIXED HYPERLIPIDEMIA: ICD-10-CM

## 2022-11-14 DIAGNOSIS — K21.9 GASTROESOPHAGEAL REFLUX DISEASE WITHOUT ESOPHAGITIS: ICD-10-CM

## 2022-11-14 DIAGNOSIS — I48.91 ATRIAL FIBRILLATION WITH RAPID VENTRICULAR RESPONSE: ICD-10-CM

## 2022-11-14 DIAGNOSIS — I10 ESSENTIAL HYPERTENSION: ICD-10-CM

## 2022-11-16 LAB
25(OH)D3+25(OH)D2 SERPL-MCNC: 93.1 NG/ML (ref 30–100)
ALBUMIN SERPL-MCNC: 4.1 G/DL (ref 3.5–5.2)
ALBUMIN/GLOB SERPL: 2.4 G/DL
ALP SERPL-CCNC: 67 U/L (ref 39–117)
ALT SERPL-CCNC: 22 U/L (ref 1–41)
AST SERPL-CCNC: 24 U/L (ref 1–40)
BASOPHILS # BLD AUTO: 0.02 10*3/MM3 (ref 0–0.2)
BASOPHILS NFR BLD AUTO: 0.5 % (ref 0–1.5)
BILIRUB SERPL-MCNC: 0.7 MG/DL (ref 0–1.2)
BUN SERPL-MCNC: 27 MG/DL (ref 8–23)
BUN/CREAT SERPL: 30 (ref 7–25)
CALCIUM SERPL-MCNC: 9.1 MG/DL (ref 8.6–10.5)
CHLORIDE SERPL-SCNC: 103 MMOL/L (ref 98–107)
CHOLEST SERPL-MCNC: 167 MG/DL (ref 0–200)
CO2 SERPL-SCNC: 31 MMOL/L (ref 22–29)
CREAT SERPL-MCNC: 0.9 MG/DL (ref 0.76–1.27)
EGFRCR SERPLBLD CKD-EPI 2021: 92.5 ML/MIN/1.73
EOSINOPHIL # BLD AUTO: 0.06 10*3/MM3 (ref 0–0.4)
EOSINOPHIL NFR BLD AUTO: 1.5 % (ref 0.3–6.2)
ERYTHROCYTE [DISTWIDTH] IN BLOOD BY AUTOMATED COUNT: 11.5 % (ref 12.3–15.4)
GLOBULIN SER CALC-MCNC: 1.7 GM/DL
GLUCOSE SERPL-MCNC: 95 MG/DL (ref 65–99)
HBA1C MFR BLD: 5.7 % (ref 4.8–5.6)
HCT VFR BLD AUTO: 42.6 % (ref 37.5–51)
HDLC SERPL-MCNC: 46 MG/DL (ref 40–60)
HGB BLD-MCNC: 14.6 G/DL (ref 13–17.7)
IMM GRANULOCYTES # BLD AUTO: 0.01 10*3/MM3 (ref 0–0.05)
IMM GRANULOCYTES NFR BLD AUTO: 0.3 % (ref 0–0.5)
LDLC SERPL CALC-MCNC: 99 MG/DL (ref 0–100)
LYMPHOCYTES # BLD AUTO: 1.33 10*3/MM3 (ref 0.7–3.1)
LYMPHOCYTES NFR BLD AUTO: 33.8 % (ref 19.6–45.3)
MCH RBC QN AUTO: 32.4 PG (ref 26.6–33)
MCHC RBC AUTO-ENTMCNC: 34.3 G/DL (ref 31.5–35.7)
MCV RBC AUTO: 94.7 FL (ref 79–97)
MONOCYTES # BLD AUTO: 0.47 10*3/MM3 (ref 0.1–0.9)
MONOCYTES NFR BLD AUTO: 12 % (ref 5–12)
NEUTROPHILS # BLD AUTO: 2.04 10*3/MM3 (ref 1.7–7)
NEUTROPHILS NFR BLD AUTO: 51.9 % (ref 42.7–76)
NRBC BLD AUTO-RTO: 0 /100 WBC (ref 0–0.2)
PLATELET # BLD AUTO: 168 10*3/MM3 (ref 140–450)
POTASSIUM SERPL-SCNC: 4.6 MMOL/L (ref 3.5–5.2)
PROT SERPL-MCNC: 5.8 G/DL (ref 6–8.5)
RBC # BLD AUTO: 4.5 10*6/MM3 (ref 4.14–5.8)
SODIUM SERPL-SCNC: 141 MMOL/L (ref 136–145)
TRIGL SERPL-MCNC: 123 MG/DL (ref 0–150)
TSH SERPL DL<=0.005 MIU/L-ACNC: 1.26 UIU/ML (ref 0.27–4.2)
VLDLC SERPL CALC-MCNC: 22 MG/DL (ref 5–40)
WBC # BLD AUTO: 3.93 10*3/MM3 (ref 3.4–10.8)

## 2022-11-21 ENCOUNTER — OFFICE VISIT (OUTPATIENT)
Dept: FAMILY MEDICINE CLINIC | Facility: CLINIC | Age: 67
End: 2022-11-21

## 2022-11-21 VITALS
DIASTOLIC BLOOD PRESSURE: 76 MMHG | SYSTOLIC BLOOD PRESSURE: 120 MMHG | HEIGHT: 72 IN | BODY MASS INDEX: 26.01 KG/M2 | TEMPERATURE: 98 F | HEART RATE: 67 BPM | WEIGHT: 192 LBS | OXYGEN SATURATION: 97 %

## 2022-11-21 DIAGNOSIS — Z00.00 MEDICARE ANNUAL WELLNESS VISIT, SUBSEQUENT: Primary | ICD-10-CM

## 2022-11-21 DIAGNOSIS — M15.9 PRIMARY OSTEOARTHRITIS INVOLVING MULTIPLE JOINTS: ICD-10-CM

## 2022-11-21 DIAGNOSIS — K21.9 GASTROESOPHAGEAL REFLUX DISEASE WITHOUT ESOPHAGITIS: ICD-10-CM

## 2022-11-21 DIAGNOSIS — E55.9 VITAMIN D DEFICIENCY: ICD-10-CM

## 2022-11-21 DIAGNOSIS — I10 ESSENTIAL HYPERTENSION: ICD-10-CM

## 2022-11-21 DIAGNOSIS — R73.02 GLUCOSE INTOLERANCE (IMPAIRED GLUCOSE TOLERANCE): ICD-10-CM

## 2022-11-21 DIAGNOSIS — N52.8 OTHER MALE ERECTILE DYSFUNCTION: ICD-10-CM

## 2022-11-21 DIAGNOSIS — E78.2 MIXED HYPERLIPIDEMIA: ICD-10-CM

## 2022-11-21 DIAGNOSIS — I48.91 ATRIAL FIBRILLATION WITH RAPID VENTRICULAR RESPONSE: ICD-10-CM

## 2022-11-21 DIAGNOSIS — N40.0 BENIGN NODULAR PROSTATIC HYPERPLASIA WITHOUT LOWER URINARY TRACT SYMPTOMS: ICD-10-CM

## 2022-11-21 PROCEDURE — 1170F FXNL STATUS ASSESSED: CPT | Performed by: FAMILY MEDICINE

## 2022-11-21 PROCEDURE — 1126F AMNT PAIN NOTED NONE PRSNT: CPT | Performed by: FAMILY MEDICINE

## 2022-11-21 PROCEDURE — G0439 PPPS, SUBSEQ VISIT: HCPCS | Performed by: FAMILY MEDICINE

## 2022-11-21 PROCEDURE — 1160F RVW MEDS BY RX/DR IN RCRD: CPT | Performed by: FAMILY MEDICINE

## 2022-11-21 RX ORDER — METOPROLOL SUCCINATE 25 MG/1
25 TABLET, EXTENDED RELEASE ORAL DAILY
Qty: 90 TABLET | Refills: 1 | Status: SHIPPED | OUTPATIENT
Start: 2022-11-21 | End: 2022-12-08 | Stop reason: SDUPTHER

## 2022-11-21 RX ORDER — MELOXICAM 15 MG/1
15 TABLET ORAL EVERY OTHER DAY
Qty: 45 TABLET | Refills: 1 | Status: SHIPPED | OUTPATIENT
Start: 2022-11-21 | End: 2022-12-08 | Stop reason: SDUPTHER

## 2022-11-21 NOTE — PROGRESS NOTES
The ABCs of the Annual Wellness Visit  Subsequent Medicare Wellness Visit    Chief Complaint   Patient presents with   • Annual Exam     SUB AWV      Subjective    History of Present Illness:  Alfred Vazquez is a 67 y.o. male who presents for a Subsequent Medicare Wellness Visit.  67-year-old white male with multiple medical issues here for further medical management.  Patient is also here for subsequent Medicare wellness visit.  Past medical history includes atrial fibrillation, hyperlipidemia, essential hypertension as well as glucose intolerance and vitamin D deficiency.  Patient since last visit has changed his diet radically and has lost approximately 25 pounds as well as lower his saturated fats.  Current medications include Pepcid, Tambocor, Flonase, Mobic as well as Toprol-XL and sildenafil.  All medications are used appropriately and are well-tolerated without side effects.  Fasting labs have taken place prior to this appointment.  Patient does use over-the-counter supplements and vitamins including vitamin D3.  81 mg aspirin daily is also used.  There has been no chest pain, shortness of breath or decreased stamina.  He continues to play golf anywhere from 3 to 5 days/week.    The following portions of the patient's history were reviewed and   updated as appropriate: allergies, current medications, past family history, past medical history, past social history, past surgical history and problem list.    Compared to one year ago, the patient feels his physical   health is better.    Compared to one year ago, the patient feels his mental   health is the same.    Recent Hospitalizations:  He was not admitted to the hospital during the last year.       Current Medical Providers:  Patient Care Team:  Julio Watson DO as PCP - General    Outpatient Medications Prior to Visit   Medication Sig Dispense Refill   • aspirin 81 MG chewable tablet Chew 1 tablet daily.     • CALCIUM CARBONATE-VITAMIN D PO Take  by  mouth.     • Cholecalciferol (VITAMIN D3) 125 MCG (5000 UT) capsule capsule Take 5,000 Units by mouth Daily.     • famotidine (PEPCID) 10 MG tablet Take 20 mg by mouth Daily.     • flecainide (TAMBOCOR) 100 MG tablet Take 100 mg by mouth 2 (Two) Times a Day.     • fluticasone (Flonase) 50 MCG/ACT nasal spray 2 sprays into the nostril(s) as directed by provider Daily. 16 g 0   • PREBIOTIC PRODUCT PO Take  by mouth.     • sildenafil (REVATIO) 20 MG tablet TAKE 2-5 TABLETS BY MOUTH 1 HOUR PRIOR TO EVENT 20 tablet 2   • meloxicam (MOBIC) 15 MG tablet Take 1 tablet by mouth Every Other Day. Every other day 45 tablet 1   • metoprolol succinate XL (TOPROL-XL) 25 MG 24 hr tablet Take 1 tablet by mouth Daily. 90 tablet 1   • promethazine-dextromethorphan (PROMETHAZINE-DM) 6.25-15 MG/5ML syrup Take 5 mL by mouth 4 (Four) Times a Day As Needed for Cough. 120 mL 0     No facility-administered medications prior to visit.       No opioid medication identified on active medication list. I have reviewed chart for other potential  high risk medication/s and harmful drug interactions in the elderly.          Aspirin is on active medication list. Aspirin use is indicated based on review of current medical condition/s. Pros and cons of this therapy have been discussed today. Benefits of this medication outweigh potential harm.  Patient has been encouraged to continue taking this medication.  .      Patient Active Problem List   Diagnosis   • Atrial fibrillation with rapid ventricular response (HCC)   • Degeneration of intervertebral disc of lumbar region   • Gastroesophageal reflux disease without esophagitis   • Mixed hyperlipidemia   • Insomnia   • Primary osteoarthritis involving multiple joints   • Seasonal allergic rhinitis   • Other male erectile dysfunction   • Glucose intolerance (impaired glucose tolerance)   • Onychomycosis of right great toe   • Benign nodular prostatic hyperplasia without lower urinary tract symptoms   •  "Abdominal aortic aneurysm   • Vitamin D deficiency   • Essential hypertension     Advance Care Planning  Advance Directive is not on file.  ACP discussion was held with the patient during this visit. Patient does not have an advance directive, information provided.    Review of Systems   Cardiovascular:        Hypertension, hyperlipidemia, atrial fibrillation with rapid ventricular response, abdominal aortic aneurysm   Gastrointestinal:        GERD   Endocrine:        Overweight, vitamin D deficiency, glucose intolerance   Genitourinary:        BPH, erectile dysfunction   Musculoskeletal: Positive for arthralgias.        Objective    Vitals:    11/21/22 1014   BP: 120/76   Pulse: 67   Temp: 98 °F (36.7 °C)   SpO2: 97%   Weight: 87.1 kg (192 lb)   Height: 182.9 cm (72\")   PainSc: 0-No pain     Estimated body mass index is 26.04 kg/m² as calculated from the following:    Height as of this encounter: 182.9 cm (72\").    Weight as of this encounter: 87.1 kg (192 lb).    BMI is >= 25 and <30. (Overweight) The following options were offered after discussion;: exercise counseling/recommendations and nutrition counseling/recommendations      Does the patient have evidence of cognitive impairment? No    Physical Exam  Vitals and nursing note reviewed.   Constitutional:       Appearance: Normal appearance. He is well-developed and well-groomed.      Comments: Overweight with a BMI of 26- 19 pound weight loss since May of this year   HENT:      Head: Normocephalic and atraumatic.   Neck:      Thyroid: No thyroid mass or thyromegaly.      Vascular: Normal carotid pulses. No carotid bruit.      Trachea: Trachea and phonation normal.   Cardiovascular:      Rate and Rhythm: Normal rate and regular rhythm.      Heart sounds: Normal heart sounds. No murmur heard.    No friction rub. No gallop.   Pulmonary:      Effort: Pulmonary effort is normal. No respiratory distress.      Breath sounds: Normal breath sounds. No decreased breath " sounds, wheezing, rhonchi or rales.   Musculoskeletal:      Cervical back: Neck supple.   Lymphadenopathy:      Cervical: No cervical adenopathy.   Skin:     General: Skin is warm and dry.      Findings: No rash.   Neurological:      Mental Status: He is alert and oriented to person, place, and time.   Psychiatric:         Attention and Perception: Attention and perception normal.         Mood and Affect: Mood and affect normal.         Speech: Speech normal.         Behavior: Behavior normal. Behavior is cooperative.         Thought Content: Thought content normal.         Cognition and Memory: Cognition and memory normal.         Judgment: Judgment normal.       Orders Only on 11/14/2022   Component Date Value Ref Range Status   • WBC 11/15/2022 3.93  3.40 - 10.80 10*3/mm3 Final   • RBC 11/15/2022 4.50  4.14 - 5.80 10*6/mm3 Final   • Hemoglobin 11/15/2022 14.6  13.0 - 17.7 g/dL Final   • Hematocrit 11/15/2022 42.6  37.5 - 51.0 % Final   • MCV 11/15/2022 94.7  79.0 - 97.0 fL Final   • MCH 11/15/2022 32.4  26.6 - 33.0 pg Final   • MCHC 11/15/2022 34.3  31.5 - 35.7 g/dL Final   • RDW 11/15/2022 11.5 (L)  12.3 - 15.4 % Final   • Platelets 11/15/2022 168  140 - 450 10*3/mm3 Final   • Neutrophil Rel % 11/15/2022 51.9  42.7 - 76.0 % Final   • Lymphocyte Rel % 11/15/2022 33.8  19.6 - 45.3 % Final   • Monocyte Rel % 11/15/2022 12.0  5.0 - 12.0 % Final   • Eosinophil Rel % 11/15/2022 1.5  0.3 - 6.2 % Final   • Basophil Rel % 11/15/2022 0.5  0.0 - 1.5 % Final   • Neutrophils Absolute 11/15/2022 2.04  1.70 - 7.00 10*3/mm3 Final   • Lymphocytes Absolute 11/15/2022 1.33  0.70 - 3.10 10*3/mm3 Final   • Monocytes Absolute 11/15/2022 0.47  0.10 - 0.90 10*3/mm3 Final   • Eosinophils Absolute 11/15/2022 0.06  0.00 - 0.40 10*3/mm3 Final   • Basophils Absolute 11/15/2022 0.02  0.00 - 0.20 10*3/mm3 Final   • Immature Granulocyte Rel % 11/15/2022 0.3  0.0 - 0.5 % Final   • Immature Grans Absolute 11/15/2022 0.01  0.00 - 0.05 10*3/mm3  Final   • nRBC 11/15/2022 0.0  0.0 - 0.2 /100 WBC Final   • TSH 11/15/2022 1.260  0.270 - 4.200 uIU/mL Final   • 25 Hydroxy, Vitamin D 11/15/2022 93.1  30.0 - 100.0 ng/ml Final    Comment: Results may be falsely increased if patient taking Biotin.  Reference Range for Total Vitamin D 25(OH)  Deficiency <20.0 ng/mL  Insufficiency 21-29 ng/mL  Sufficiency  ng/mL  Toxicity >100 ng/ml     • Glucose 11/15/2022 95  65 - 99 mg/dL Final   • BUN 11/15/2022 27 (H)  8 - 23 mg/dL Final   • Creatinine 11/15/2022 0.90  0.76 - 1.27 mg/dL Final   • EGFR Result 11/15/2022 92.5  >60.0 mL/min/1.73 Final    Comment: National Kidney Foundation and American Society of  Nephrology (ASN) Task Force recommended calculation based  on the Chronic Kidney Disease Epidemiology Collaboration  (CKD-EPI) equation refit without adjustment for race.  GFR Normal >60  Chronic Kidney Disease <60  Kidney Failure <15     • BUN/Creatinine Ratio 11/15/2022 30.0 (H)  7.0 - 25.0 Final   • Sodium 11/15/2022 141  136 - 145 mmol/L Final   • Potassium 11/15/2022 4.6  3.5 - 5.2 mmol/L Final   • Chloride 11/15/2022 103  98 - 107 mmol/L Final   • Total CO2 11/15/2022 31.0 (H)  22.0 - 29.0 mmol/L Final   • Calcium 11/15/2022 9.1  8.6 - 10.5 mg/dL Final   • Total Protein 11/15/2022 5.8 (L)  6.0 - 8.5 g/dL Final   • Albumin 11/15/2022 4.10  3.50 - 5.20 g/dL Final   • Globulin 11/15/2022 1.7  gm/dL Final   • A/G Ratio 11/15/2022 2.4  g/dL Final   • Total Bilirubin 11/15/2022 0.7  0.0 - 1.2 mg/dL Final   • Alkaline Phosphatase 11/15/2022 67  39 - 117 U/L Final   • AST (SGOT) 11/15/2022 24  1 - 40 U/L Final   • ALT (SGPT) 11/15/2022 22  1 - 41 U/L Final   • Hemoglobin A1C 11/15/2022 5.70 (H)  4.80 - 5.60 % Final    Comment: Hemoglobin A1C Ranges:  Increased Risk for Diabetes  5.7% to 6.4%  Diabetes                     >= 6.5%  Diabetic Goal                < 7.0%     • Total Cholesterol 11/15/2022 167  0 - 200 mg/dL Final    Comment: Cholesterol Reference  Ranges  (U.S. Department of Health and Human Services ATP III  Classifications)  Desirable          <200 mg/dL  Borderline High    200-239 mg/dL  High Risk          >240 mg/dL  Triglyceride Reference Ranges  (U.S. Department of Health and Human Services ATP III  Classifications)  Normal           <150 mg/dL  Borderline High  150-199 mg/dL  High             200-499 mg/dL  Very High        >500 mg/dL  HDL Reference Ranges  (U.S. Department of Health and Human Services ATP III  Classifications)  Low     <40 mg/dl (major risk factor for CHD)  High    >60 mg/dl ('negative' risk factor for CHD)  LDL Reference Ranges  (U.S. Department of Health and Human Services ATP III  Classifications)  Optimal          <100 mg/dL  Near Optimal     100-129 mg/dL  Borderline High  130-159 mg/dL  High             160-189 mg/dL  Very High        >189 mg/dL     • Triglycerides 11/15/2022 123  0 - 150 mg/dL Final   • HDL Cholesterol 11/15/2022 46  40 - 60 mg/dL Final   • VLDL Cholesterol Ronni 11/15/2022 22  5 - 40 mg/dL Final   • LDL Chol Calc ([facility]) 11/15/2022 99  0 - 100 mg/dL Final   Office Visit on 11/10/2022   Component Date Value Ref Range Status   • Rapid Influenza A Ag 11/10/2022 Negative  Negative Final   • Rapid Influenza B Ag 11/10/2022 Negative  Negative Final   • Internal Control 11/10/2022 Passed  Passed Final   • Lot Number 11/10/2022 [id number]   Final   • Expiration Date 11/10/2022 2022   Final       Lab Results   Component Value Date    CHLPL 167 11/15/2022    TRIG 123 11/15/2022    HDL 46 11/15/2022    LDL 99 11/15/2022    VLDL 22 11/15/2022    HGBA1C 5.70 (H) 11/15/2022            HEALTH RISK ASSESSMENT    Smoking Status:  Social History     Tobacco Use   Smoking Status Former   • Packs/day: 0.25   • Years: 2.00   • Pack years: 0.50   • Types: Cigarettes   • Quit date:    • Years since quittin.9   Smokeless Tobacco Never     Alcohol Consumption:  Social History     Substance and Sexual Activity   Alcohol Use  Yes     Fall Risk Screen:    ANAADI Fall Risk Assessment was completed, and patient is at LOW risk for falls.Assessment completed on:11/21/2022    Depression Screening:  PHQ-2/PHQ-9 Depression Screening 11/21/2022   Retired PHQ-9 Total Score -   Retired Total Score -   Little Interest or Pleasure in Doing Things 0-->not at all   Feeling Down, Depressed or Hopeless 0-->not at all   PHQ-9: Brief Depression Severity Measure Score 0       Health Habits and Functional and Cognitive Screening:  Functional & Cognitive Status 11/21/2022   Do you have difficulty preparing food and eating? No   Do you have difficulty bathing yourself, getting dressed or grooming yourself? No   Do you have difficulty using the toilet? No   Do you have difficulty moving around from place to place? No   Do you have trouble with steps or getting out of a bed or a chair? No   Current Diet Well Balanced Diet   Dental Exam Up to date   Eye Exam Up to date   Exercise (times per week) 7 times per week   Current Exercises Include Walking;Yard Work   Current Exercise Activities Include -   Do you need help using the phone?  No   Are you deaf or do you have serious difficulty hearing?  No   Do you need help with transportation? No   Do you need help shopping? No   Do you need help preparing meals?  No   Do you need help with housework?  No   Do you need help with laundry? No   Do you need help taking your medications? No   Do you need help managing money? No   Do you ever drive or ride in a car without wearing a seat belt? No   Have you felt unusual stress, anger or loneliness in the last month? No   Who do you live with? Spouse   If you need help, do you have trouble finding someone available to you? -   Have you been bothered in the last four weeks by sexual problems? No   Do you have difficulty concentrating, remembering or making decisions? No       Age-appropriate Screening Schedule:  Refer to the list below for future screening recommendations  based on patient's age, sex and/or medical conditions. Orders for these recommended tests are listed in the plan section. The patient has been provided with a written plan.    Health Maintenance   Topic Date Due   • ZOSTER VACCINE (2 of 3) 01/07/2014   • TDAP/TD VACCINES (2 - Td or Tdap) 11/13/2022   • URINE MICROALBUMIN  06/05/2030 (Originally 11/13/2020)   • DIABETIC FOOT EXAM  11/18/2030 (Originally 11/13/2020)   • HEMOGLOBIN A1C  05/15/2023   • DIABETIC EYE EXAM  07/26/2023   • LIPID PANEL  11/15/2023   • INFLUENZA VACCINE  Completed              Assessment & Plan   CMS Preventative Services Quick Reference  Risk Factors Identified During Encounter  Cardiovascular Disease  Dementia/Memory   Depression/Dysphoria  Fall Risk-High or Moderate  Hearing Problem  Immunizations Discussed/Encouraged (specific Immunizations; Tdap, Influenza and Shingrix  Obesity/Overweight   Polypharmacy  The above risks/problems have been discussed with the patient.  Follow up actions/plans if indicated are seen below in the Assessment/Plan Section.  Pertinent information has been shared with the patient in the After Visit Summary.    Diagnoses and all orders for this visit:    1. Medicare annual wellness visit, subsequent (Primary)  -     Comprehensive metabolic panel; Future  -     Hemoglobin A1c; Future  -     Vitamin D 25 hydroxy; Future  -     Lipid panel; Future  -     CBC w AUTO Differential; Future    2. Essential hypertension  -     Comprehensive metabolic panel; Future  -     Hemoglobin A1c; Future  -     Vitamin D 25 hydroxy; Future  -     Lipid panel; Future  -     CBC w AUTO Differential; Future    3. Mixed hyperlipidemia  -     Comprehensive metabolic panel; Future  -     Hemoglobin A1c; Future  -     Vitamin D 25 hydroxy; Future  -     Lipid panel; Future  -     CBC w AUTO Differential; Future    4. Glucose intolerance (impaired glucose tolerance)  -     Comprehensive metabolic panel; Future  -     Hemoglobin A1c;  Future  -     Vitamin D 25 hydroxy; Future  -     Lipid panel; Future  -     CBC w AUTO Differential; Future    5. Vitamin D deficiency  -     Comprehensive metabolic panel; Future  -     Hemoglobin A1c; Future  -     Vitamin D 25 hydroxy; Future  -     Lipid panel; Future  -     CBC w AUTO Differential; Future    6. Gastroesophageal reflux disease without esophagitis  -     Comprehensive metabolic panel; Future  -     Hemoglobin A1c; Future  -     Vitamin D 25 hydroxy; Future  -     Lipid panel; Future  -     CBC w AUTO Differential; Future    7. Benign nodular prostatic hyperplasia without lower urinary tract symptoms  -     Comprehensive metabolic panel; Future  -     Hemoglobin A1c; Future  -     Vitamin D 25 hydroxy; Future  -     Lipid panel; Future  -     CBC w AUTO Differential; Future    8. Other male erectile dysfunction  -     Comprehensive metabolic panel; Future  -     Hemoglobin A1c; Future  -     Vitamin D 25 hydroxy; Future  -     Lipid panel; Future  -     CBC w AUTO Differential; Future    9. Primary osteoarthritis involving multiple joints  -     meloxicam (MOBIC) 15 MG tablet; Take 1 tablet by mouth Every Other Day. Every other day  Dispense: 45 tablet; Refill: 1  -     Comprehensive metabolic panel; Future  -     Hemoglobin A1c; Future  -     Vitamin D 25 hydroxy; Future  -     Lipid panel; Future  -     CBC w AUTO Differential; Future    10. Atrial fibrillation with rapid ventricular response (HCC)  -     metoprolol succinate XL (TOPROL-XL) 25 MG 24 hr tablet; Take 1 tablet by mouth Daily.  Dispense: 90 tablet; Refill: 1  -     Comprehensive metabolic panel; Future  -     Hemoglobin A1c; Future  -     Vitamin D 25 hydroxy; Future  -     Lipid panel; Future  -     CBC w AUTO Differential; Future        Follow Up:   Return in about 6 months (around 5/21/2023) for Recheck.     An After Visit Summary and PPPS were made available to the patient.          I spent 30 minutes caring for Alfred on this  date of service. This time includes time spent by me in the following activities:preparing for the visit, reviewing tests, obtaining and/or reviewing a separately obtained history, performing a medically appropriate examination and/or evaluation , counseling and educating the patient/family/caregiver, ordering medications, tests, or procedures, referring and communicating with other health care professionals , documenting information in the medical record, independently interpreting results and communicating that information with the patient/family/caregiver and care coordination

## 2022-12-08 DIAGNOSIS — I48.91 ATRIAL FIBRILLATION WITH RAPID VENTRICULAR RESPONSE: ICD-10-CM

## 2022-12-08 DIAGNOSIS — M15.9 PRIMARY OSTEOARTHRITIS INVOLVING MULTIPLE JOINTS: ICD-10-CM

## 2022-12-08 RX ORDER — MELOXICAM 15 MG/1
15 TABLET ORAL EVERY OTHER DAY
Qty: 45 TABLET | Refills: 1 | Status: SHIPPED | OUTPATIENT
Start: 2022-12-08

## 2022-12-08 RX ORDER — METOPROLOL SUCCINATE 25 MG/1
25 TABLET, EXTENDED RELEASE ORAL DAILY
Qty: 90 TABLET | Refills: 1 | Status: SHIPPED | OUTPATIENT
Start: 2022-12-08

## 2023-05-15 DIAGNOSIS — Z00.00 MEDICARE ANNUAL WELLNESS VISIT, SUBSEQUENT: ICD-10-CM

## 2023-05-15 DIAGNOSIS — I10 ESSENTIAL HYPERTENSION: ICD-10-CM

## 2023-05-15 DIAGNOSIS — E55.9 VITAMIN D DEFICIENCY: ICD-10-CM

## 2023-05-15 DIAGNOSIS — K21.9 GASTROESOPHAGEAL REFLUX DISEASE WITHOUT ESOPHAGITIS: ICD-10-CM

## 2023-05-15 DIAGNOSIS — N40.0 BENIGN NODULAR PROSTATIC HYPERPLASIA WITHOUT LOWER URINARY TRACT SYMPTOMS: ICD-10-CM

## 2023-05-15 DIAGNOSIS — I48.91 ATRIAL FIBRILLATION WITH RAPID VENTRICULAR RESPONSE: ICD-10-CM

## 2023-05-15 DIAGNOSIS — N52.8 OTHER MALE ERECTILE DYSFUNCTION: ICD-10-CM

## 2023-05-15 DIAGNOSIS — M15.9 PRIMARY OSTEOARTHRITIS INVOLVING MULTIPLE JOINTS: ICD-10-CM

## 2023-05-15 DIAGNOSIS — R73.02 GLUCOSE INTOLERANCE (IMPAIRED GLUCOSE TOLERANCE): ICD-10-CM

## 2023-05-15 DIAGNOSIS — E78.2 MIXED HYPERLIPIDEMIA: ICD-10-CM

## 2023-05-18 LAB
25(OH)D3+25(OH)D2 SERPL-MCNC: 90.2 NG/ML (ref 30–100)
ALBUMIN SERPL-MCNC: 4.5 G/DL (ref 3.5–5.2)
ALBUMIN/GLOB SERPL: 2.8 G/DL
ALP SERPL-CCNC: 65 U/L (ref 39–117)
ALT SERPL-CCNC: 21 U/L (ref 1–41)
AST SERPL-CCNC: 22 U/L (ref 1–40)
BASOPHILS # BLD AUTO: 0.03 10*3/MM3 (ref 0–0.2)
BASOPHILS NFR BLD AUTO: 0.7 % (ref 0–1.5)
BILIRUB SERPL-MCNC: 0.7 MG/DL (ref 0–1.2)
BUN SERPL-MCNC: 26 MG/DL (ref 8–23)
BUN/CREAT SERPL: 26 (ref 7–25)
CALCIUM SERPL-MCNC: 9.5 MG/DL (ref 8.6–10.5)
CHLORIDE SERPL-SCNC: 105 MMOL/L (ref 98–107)
CHOLEST SERPL-MCNC: 188 MG/DL (ref 0–200)
CO2 SERPL-SCNC: 29.8 MMOL/L (ref 22–29)
CREAT SERPL-MCNC: 1 MG/DL (ref 0.76–1.27)
EGFRCR SERPLBLD CKD-EPI 2021: 81.5 ML/MIN/1.73
EOSINOPHIL # BLD AUTO: 0.12 10*3/MM3 (ref 0–0.4)
EOSINOPHIL NFR BLD AUTO: 2.9 % (ref 0.3–6.2)
ERYTHROCYTE [DISTWIDTH] IN BLOOD BY AUTOMATED COUNT: 11.8 % (ref 12.3–15.4)
GLOBULIN SER CALC-MCNC: 1.6 GM/DL
GLUCOSE SERPL-MCNC: 95 MG/DL (ref 65–99)
HBA1C MFR BLD: 5.7 % (ref 4.8–5.6)
HCT VFR BLD AUTO: 44.2 % (ref 37.5–51)
HDLC SERPL-MCNC: 62 MG/DL (ref 40–60)
HGB BLD-MCNC: 14.9 G/DL (ref 13–17.7)
IMM GRANULOCYTES # BLD AUTO: 0.01 10*3/MM3 (ref 0–0.05)
IMM GRANULOCYTES NFR BLD AUTO: 0.2 % (ref 0–0.5)
LDLC SERPL CALC-MCNC: 110 MG/DL (ref 0–100)
LYMPHOCYTES # BLD AUTO: 1.26 10*3/MM3 (ref 0.7–3.1)
LYMPHOCYTES NFR BLD AUTO: 30.3 % (ref 19.6–45.3)
MCH RBC QN AUTO: 32.6 PG (ref 26.6–33)
MCHC RBC AUTO-ENTMCNC: 33.7 G/DL (ref 31.5–35.7)
MCV RBC AUTO: 96.7 FL (ref 79–97)
MONOCYTES # BLD AUTO: 0.5 10*3/MM3 (ref 0.1–0.9)
MONOCYTES NFR BLD AUTO: 12 % (ref 5–12)
NEUTROPHILS # BLD AUTO: 2.24 10*3/MM3 (ref 1.7–7)
NEUTROPHILS NFR BLD AUTO: 53.9 % (ref 42.7–76)
NRBC BLD AUTO-RTO: 0 /100 WBC (ref 0–0.2)
PLATELET # BLD AUTO: 189 10*3/MM3 (ref 140–450)
POTASSIUM SERPL-SCNC: 4.6 MMOL/L (ref 3.5–5.2)
PROT SERPL-MCNC: 6.1 G/DL (ref 6–8.5)
RBC # BLD AUTO: 4.57 10*6/MM3 (ref 4.14–5.8)
SODIUM SERPL-SCNC: 143 MMOL/L (ref 136–145)
TRIGL SERPL-MCNC: 89 MG/DL (ref 0–150)
VLDLC SERPL CALC-MCNC: 16 MG/DL (ref 5–40)
WBC # BLD AUTO: 4.16 10*3/MM3 (ref 3.4–10.8)

## 2023-05-24 ENCOUNTER — OFFICE VISIT (OUTPATIENT)
Dept: FAMILY MEDICINE CLINIC | Facility: CLINIC | Age: 68
End: 2023-05-24
Payer: MEDICARE

## 2023-05-24 VITALS
HEART RATE: 64 BPM | HEIGHT: 72 IN | SYSTOLIC BLOOD PRESSURE: 120 MMHG | BODY MASS INDEX: 26.01 KG/M2 | DIASTOLIC BLOOD PRESSURE: 80 MMHG | WEIGHT: 192 LBS | TEMPERATURE: 97.3 F | OXYGEN SATURATION: 97 %

## 2023-05-24 DIAGNOSIS — R73.02 GLUCOSE INTOLERANCE (IMPAIRED GLUCOSE TOLERANCE): ICD-10-CM

## 2023-05-24 DIAGNOSIS — E55.9 VITAMIN D DEFICIENCY: ICD-10-CM

## 2023-05-24 DIAGNOSIS — I10 ESSENTIAL HYPERTENSION: Primary | ICD-10-CM

## 2023-05-24 DIAGNOSIS — R35.1 NOCTURIA: ICD-10-CM

## 2023-05-24 DIAGNOSIS — E78.2 MIXED HYPERLIPIDEMIA: ICD-10-CM

## 2023-05-24 DIAGNOSIS — M15.9 PRIMARY OSTEOARTHRITIS INVOLVING MULTIPLE JOINTS: ICD-10-CM

## 2023-05-24 DIAGNOSIS — I48.91 ATRIAL FIBRILLATION WITH RAPID VENTRICULAR RESPONSE: ICD-10-CM

## 2023-05-24 DIAGNOSIS — S20.211A CONTUSION OF RIB ON RIGHT SIDE, INITIAL ENCOUNTER: ICD-10-CM

## 2023-05-24 RX ORDER — METOPROLOL SUCCINATE 25 MG/1
25 TABLET, EXTENDED RELEASE ORAL DAILY
Qty: 90 TABLET | Refills: 1 | Status: SHIPPED | OUTPATIENT
Start: 2023-05-24

## 2023-05-24 NOTE — PROGRESS NOTES
Subjective   Alfred Vazquez is a 67 y.o. male with   Chief Complaint   Patient presents with   • Hypertension   • Osteoarthritis   • Rib Pain     Lower right side since yesterday-bending over hurts and pushing on it hurts    .    History of Present Illness   67-year-old white male with known history of hypertension,  glucose intolerance and hyperlipidemia here for further medical management.  Patient also has past history of atrial fibrillation with rapid ventricular response.  Current medications include Tambocor and Toprol-XL.  He has used famotidine in the past for GERD and sildenafil for erectile dysfunction.  He does have osteoarthritis at multiple levels and has used meloxicam sparingly in the past.  He is retired and plays golf 3-5 times a week.  He usually does not play more than 9 holes.  1 to 2 days ago he was trying to get his phone out of his left pocket while driving his automobile.  He leaned significantly against the console in the middle of the front seat which has resulted in immediate pain to the right lower ribs which wraps around to his posterior rib cage.  This has been persistent and trigger primarily with recurrent pressure to this area.  He is able to move and swing a golf club but when pressure is applied he triggers more pain.  The following portions of the patient's history were reviewed and updated as appropriate: allergies, current medications, past family history, past medical history, past social history, past surgical history and problem list.    Review of Systems   Cardiovascular: Positive for chest pain (Right rib cage pain).        Hypertension, atrial fibrillation with rapid ventricular response   Gastrointestinal:        GERD   Endocrine:        Glucose intolerance, vitamin D deficiency       Objective     Vitals:    05/24/23 0848   BP: 120/80   Pulse: 64   Temp: 97.3 °F (36.3 °C)   SpO2: 97%       Recent Results (from the past 672 hour(s))   CBC w AUTO Differential    Collection  Time: 05/17/23  8:36 AM    Specimen: Blood   Result Value Ref Range    WBC 4.16 3.40 - 10.80 10*3/mm3    RBC 4.57 4.14 - 5.80 10*6/mm3    Hemoglobin 14.9 13.0 - 17.7 g/dL    Hematocrit 44.2 37.5 - 51.0 %    MCV 96.7 79.0 - 97.0 fL    MCH 32.6 26.6 - 33.0 pg    MCHC 33.7 31.5 - 35.7 g/dL    RDW 11.8 (L) 12.3 - 15.4 %    Platelets 189 140 - 450 10*3/mm3    Neutrophil Rel % 53.9 42.7 - 76.0 %    Lymphocyte Rel % 30.3 19.6 - 45.3 %    Monocyte Rel % 12.0 5.0 - 12.0 %    Eosinophil Rel % 2.9 0.3 - 6.2 %    Basophil Rel % 0.7 0.0 - 1.5 %    Neutrophils Absolute 2.24 1.70 - 7.00 10*3/mm3    Lymphocytes Absolute 1.26 0.70 - 3.10 10*3/mm3    Monocytes Absolute 0.50 0.10 - 0.90 10*3/mm3    Eosinophils Absolute 0.12 0.00 - 0.40 10*3/mm3    Basophils Absolute 0.03 0.00 - 0.20 10*3/mm3    Immature Granulocyte Rel % 0.2 0.0 - 0.5 %    Immature Grans Absolute 0.01 0.00 - 0.05 10*3/mm3    nRBC 0.0 0.0 - 0.2 /100 WBC   Lipid panel    Collection Time: 05/17/23  8:36 AM    Specimen: Blood   Result Value Ref Range    Total Cholesterol 188 0 - 200 mg/dL    Triglycerides 89 0 - 150 mg/dL    HDL Cholesterol 62 (H) 40 - 60 mg/dL    VLDL Cholesterol Ornni 16 5 - 40 mg/dL    LDL Chol Calc (NIH) 110 (H) 0 - 100 mg/dL   Vitamin D 25 hydroxy    Collection Time: 05/17/23  8:36 AM    Specimen: Blood   Result Value Ref Range    25 Hydroxy, Vitamin D 90.2 30.0 - 100.0 ng/ml   Hemoglobin A1c    Collection Time: 05/17/23  8:36 AM    Specimen: Blood   Result Value Ref Range    Hemoglobin A1C 5.70 (H) 4.80 - 5.60 %   Comprehensive metabolic panel    Collection Time: 05/17/23  8:36 AM    Specimen: Blood   Result Value Ref Range    Glucose 95 65 - 99 mg/dL    BUN 26 (H) 8 - 23 mg/dL    Creatinine 1.00 0.76 - 1.27 mg/dL    EGFR Result 81.5 >60.0 mL/min/1.73    BUN/Creatinine Ratio 26.0 (H) 7.0 - 25.0    Sodium 143 136 - 145 mmol/L    Potassium 4.6 3.5 - 5.2 mmol/L    Chloride 105 98 - 107 mmol/L    Total CO2 29.8 (H) 22.0 - 29.0 mmol/L    Calcium 9.5 8.6 -  10.5 mg/dL    Total Protein 6.1 6.0 - 8.5 g/dL    Albumin 4.5 3.5 - 5.2 g/dL    Globulin 1.6 gm/dL    A/G Ratio 2.8 g/dL    Total Bilirubin 0.7 0.0 - 1.2 mg/dL    Alkaline Phosphatase 65 39 - 117 U/L    AST (SGOT) 22 1 - 40 U/L    ALT (SGPT) 21 1 - 41 U/L       Physical Exam  Vitals and nursing note reviewed.   Constitutional:       Appearance: Normal appearance. He is well-developed and well-groomed.   HENT:      Head: Normocephalic and atraumatic.   Neck:      Thyroid: No thyroid mass or thyromegaly.      Vascular: Normal carotid pulses. No carotid bruit.      Trachea: Trachea and phonation normal.   Cardiovascular:      Rate and Rhythm: Normal rate and regular rhythm.      Heart sounds: Normal heart sounds. No murmur heard.    No friction rub. No gallop.   Pulmonary:      Effort: Pulmonary effort is normal. No respiratory distress.      Breath sounds: Normal breath sounds. No decreased breath sounds, wheezing, rhonchi or rales.   Chest:      Chest wall: Tenderness (Minimal tenderness with palpation to the right lower lateral ribs in the midaxillary line.) present. No mass, lacerations, deformity, swelling, crepitus or edema. There is no dullness to percussion.   Musculoskeletal:      Cervical back: Neck supple.   Lymphadenopathy:      Cervical: No cervical adenopathy.   Skin:     General: Skin is warm and dry.      Findings: No rash.   Neurological:      Mental Status: He is alert and oriented to person, place, and time.   Psychiatric:         Attention and Perception: Attention and perception normal.         Mood and Affect: Mood and affect normal.         Speech: Speech normal.         Behavior: Behavior normal. Behavior is cooperative.         Thought Content: Thought content normal.         Cognition and Memory: Cognition and memory normal.         Judgment: Judgment normal.         Assessment & Plan   Diagnoses and all orders for this visit:    1. Essential hypertension (Primary)  -     Hemoglobin A1c;  Future  -     Comprehensive metabolic panel; Future  -     PSA DIAGNOSTIC ONLY; Future  -     Lipid panel; Future  -     Vitamin D 25 hydroxy; Future  -     CBC w AUTO Differential; Future    2. Mixed hyperlipidemia  -     Hemoglobin A1c; Future  -     Comprehensive metabolic panel; Future  -     PSA DIAGNOSTIC ONLY; Future  -     Lipid panel; Future  -     Vitamin D 25 hydroxy; Future  -     CBC w AUTO Differential; Future    3. Glucose intolerance (impaired glucose tolerance)  -     Hemoglobin A1c; Future  -     Comprehensive metabolic panel; Future  -     PSA DIAGNOSTIC ONLY; Future  -     Lipid panel; Future  -     Vitamin D 25 hydroxy; Future  -     CBC w AUTO Differential; Future    4. Vitamin D deficiency  -     Hemoglobin A1c; Future  -     Comprehensive metabolic panel; Future  -     PSA DIAGNOSTIC ONLY; Future  -     Lipid panel; Future  -     Vitamin D 25 hydroxy; Future  -     CBC w AUTO Differential; Future    5. Primary osteoarthritis involving multiple joints  -     Hemoglobin A1c; Future  -     Comprehensive metabolic panel; Future  -     PSA DIAGNOSTIC ONLY; Future  -     Lipid panel; Future  -     Vitamin D 25 hydroxy; Future  -     CBC w AUTO Differential; Future    6. Atrial fibrillation with rapid ventricular response  -     metoprolol succinate XL (TOPROL-XL) 25 MG 24 hr tablet; Take 1 tablet by mouth Daily.  Dispense: 90 tablet; Refill: 1  -     Hemoglobin A1c; Future  -     Comprehensive metabolic panel; Future  -     PSA DIAGNOSTIC ONLY; Future  -     Lipid panel; Future  -     Vitamin D 25 hydroxy; Future  -     CBC w AUTO Differential; Future    7. Nocturia  -     PSA DIAGNOSTIC ONLY; Future    8. Contusion of rib on right side, initial encounter    Patient reassured in regards to his right rib cage.  Anticipate without treatment that this situation resolves over the next 1 to 2 weeks.  Current medications will be continued without alteration.  Anticipate follow-up in 6 months preceded by  fasting labs to include PSA.    Return in about 6 months (around 11/24/2023) for Recheck.

## 2023-08-07 RX ORDER — SILDENAFIL CITRATE 20 MG/1
TABLET ORAL
Qty: 20 TABLET | Refills: 2 | Status: SHIPPED | OUTPATIENT
Start: 2023-08-07

## 2023-11-16 DIAGNOSIS — R73.02 GLUCOSE INTOLERANCE (IMPAIRED GLUCOSE TOLERANCE): ICD-10-CM

## 2023-11-16 DIAGNOSIS — R35.1 NOCTURIA: ICD-10-CM

## 2023-11-16 DIAGNOSIS — E78.2 MIXED HYPERLIPIDEMIA: ICD-10-CM

## 2023-11-16 DIAGNOSIS — I48.91 ATRIAL FIBRILLATION WITH RAPID VENTRICULAR RESPONSE: ICD-10-CM

## 2023-11-16 DIAGNOSIS — M15.9 PRIMARY OSTEOARTHRITIS INVOLVING MULTIPLE JOINTS: ICD-10-CM

## 2023-11-16 DIAGNOSIS — I10 ESSENTIAL HYPERTENSION: ICD-10-CM

## 2023-11-16 DIAGNOSIS — E55.9 VITAMIN D DEFICIENCY: ICD-10-CM

## 2023-11-18 LAB
25(OH)D3+25(OH)D2 SERPL-MCNC: 98.7 NG/ML (ref 30–100)
ALBUMIN SERPL-MCNC: 4.6 G/DL (ref 3.5–5.2)
ALBUMIN/GLOB SERPL: 2.9 G/DL
ALP SERPL-CCNC: 57 U/L (ref 39–117)
ALT SERPL-CCNC: 22 U/L (ref 1–41)
AST SERPL-CCNC: 20 U/L (ref 1–40)
BASOPHILS # BLD AUTO: 0.04 10*3/MM3 (ref 0–0.2)
BASOPHILS NFR BLD AUTO: 1 % (ref 0–1.5)
BILIRUB SERPL-MCNC: 0.8 MG/DL (ref 0–1.2)
BUN SERPL-MCNC: 29 MG/DL (ref 8–23)
BUN/CREAT SERPL: 27.6 (ref 7–25)
CALCIUM SERPL-MCNC: 9.3 MG/DL (ref 8.6–10.5)
CHLORIDE SERPL-SCNC: 101 MMOL/L (ref 98–107)
CHOLEST SERPL-MCNC: 192 MG/DL (ref 0–200)
CO2 SERPL-SCNC: 29.7 MMOL/L (ref 22–29)
CREAT SERPL-MCNC: 1.05 MG/DL (ref 0.76–1.27)
EGFRCR SERPLBLD CKD-EPI 2021: 76.4 ML/MIN/1.73
EOSINOPHIL # BLD AUTO: 0.13 10*3/MM3 (ref 0–0.4)
EOSINOPHIL NFR BLD AUTO: 3.2 % (ref 0.3–6.2)
ERYTHROCYTE [DISTWIDTH] IN BLOOD BY AUTOMATED COUNT: 11.9 % (ref 12.3–15.4)
GLOBULIN SER CALC-MCNC: 1.6 GM/DL
GLUCOSE SERPL-MCNC: 98 MG/DL (ref 65–99)
HBA1C MFR BLD: 5.8 % (ref 4.8–5.6)
HCT VFR BLD AUTO: 44.3 % (ref 37.5–51)
HDLC SERPL-MCNC: 61 MG/DL (ref 40–60)
HGB BLD-MCNC: 15 G/DL (ref 13–17.7)
IMM GRANULOCYTES # BLD AUTO: 0.01 10*3/MM3 (ref 0–0.05)
IMM GRANULOCYTES NFR BLD AUTO: 0.2 % (ref 0–0.5)
LDLC SERPL CALC-MCNC: 114 MG/DL (ref 0–100)
LYMPHOCYTES # BLD AUTO: 1.46 10*3/MM3 (ref 0.7–3.1)
LYMPHOCYTES NFR BLD AUTO: 35.7 % (ref 19.6–45.3)
MCH RBC QN AUTO: 32.8 PG (ref 26.6–33)
MCHC RBC AUTO-ENTMCNC: 33.9 G/DL (ref 31.5–35.7)
MCV RBC AUTO: 96.7 FL (ref 79–97)
MONOCYTES # BLD AUTO: 0.46 10*3/MM3 (ref 0.1–0.9)
MONOCYTES NFR BLD AUTO: 11.2 % (ref 5–12)
NEUTROPHILS # BLD AUTO: 1.99 10*3/MM3 (ref 1.7–7)
NEUTROPHILS NFR BLD AUTO: 48.7 % (ref 42.7–76)
NRBC BLD AUTO-RTO: 0 /100 WBC (ref 0–0.2)
PLATELET # BLD AUTO: 200 10*3/MM3 (ref 140–450)
POTASSIUM SERPL-SCNC: 5 MMOL/L (ref 3.5–5.2)
PROT SERPL-MCNC: 6.2 G/DL (ref 6–8.5)
PSA SERPL-MCNC: 0.4 NG/ML (ref 0–4)
RBC # BLD AUTO: 4.58 10*6/MM3 (ref 4.14–5.8)
SODIUM SERPL-SCNC: 137 MMOL/L (ref 136–145)
TRIGL SERPL-MCNC: 94 MG/DL (ref 0–150)
VLDLC SERPL CALC-MCNC: 17 MG/DL (ref 5–40)
WBC # BLD AUTO: 4.09 10*3/MM3 (ref 3.4–10.8)

## 2023-11-29 ENCOUNTER — OFFICE VISIT (OUTPATIENT)
Dept: FAMILY MEDICINE CLINIC | Facility: CLINIC | Age: 68
End: 2023-11-29
Payer: MEDICARE

## 2023-11-29 VITALS
WEIGHT: 198 LBS | HEART RATE: 65 BPM | BODY MASS INDEX: 26.82 KG/M2 | TEMPERATURE: 97.8 F | SYSTOLIC BLOOD PRESSURE: 120 MMHG | HEIGHT: 72 IN | OXYGEN SATURATION: 97 % | DIASTOLIC BLOOD PRESSURE: 84 MMHG

## 2023-11-29 DIAGNOSIS — E78.2 MIXED HYPERLIPIDEMIA: ICD-10-CM

## 2023-11-29 DIAGNOSIS — K21.9 GASTROESOPHAGEAL REFLUX DISEASE WITHOUT ESOPHAGITIS: ICD-10-CM

## 2023-11-29 DIAGNOSIS — Z00.00 MEDICARE ANNUAL WELLNESS VISIT, SUBSEQUENT: Primary | ICD-10-CM

## 2023-11-29 DIAGNOSIS — I48.91 ATRIAL FIBRILLATION WITH RAPID VENTRICULAR RESPONSE: ICD-10-CM

## 2023-11-29 DIAGNOSIS — E55.9 VITAMIN D DEFICIENCY: ICD-10-CM

## 2023-11-29 DIAGNOSIS — M15.9 PRIMARY OSTEOARTHRITIS INVOLVING MULTIPLE JOINTS: ICD-10-CM

## 2023-11-29 DIAGNOSIS — R73.02 GLUCOSE INTOLERANCE (IMPAIRED GLUCOSE TOLERANCE): ICD-10-CM

## 2023-11-29 DIAGNOSIS — R35.1 NOCTURIA: ICD-10-CM

## 2023-11-29 DIAGNOSIS — N52.8 OTHER MALE ERECTILE DYSFUNCTION: ICD-10-CM

## 2023-11-29 RX ORDER — DIPHENOXYLATE HYDROCHLORIDE AND ATROPINE SULFATE 2.5; .025 MG/1; MG/1
TABLET ORAL DAILY
COMMUNITY

## 2023-11-30 RX ORDER — METOPROLOL SUCCINATE 25 MG/1
25 TABLET, EXTENDED RELEASE ORAL DAILY
Qty: 90 TABLET | Refills: 1 | Status: SHIPPED | OUTPATIENT
Start: 2023-11-30

## 2023-11-30 RX ORDER — MELOXICAM 15 MG/1
15 TABLET ORAL EVERY OTHER DAY
Qty: 45 TABLET | Refills: 1 | Status: SHIPPED | OUTPATIENT
Start: 2023-11-30

## 2023-11-30 NOTE — PROGRESS NOTES
The ABCs of the Annual Wellness Visit  Subsequent Medicare Wellness Visit    Subjective    Alfred Vazquez is a 68 y.o. male who presents for a Subsequent Medicare Wellness Visit.    The following portions of the patient's history were reviewed and   updated as appropriate: allergies, current medications, past family history, past medical history, past social history, past surgical history, and problem list.    Compared to one year ago, the patient feels his physical   health is the same.    Compared to one year ago, the patient feels his mental   health is the same.    Recent Hospitalizations:  He was not admitted to the hospital during the last year.       Current Medical Providers:  Patient Care Team:  Julio Watson DO as PCP - General    Outpatient Medications Prior to Visit   Medication Sig Dispense Refill    aspirin 81 MG chewable tablet Chew 1 tablet Daily.      CALCIUM CARBONATE-VITAMIN D PO Take  by mouth.      Cholecalciferol (VITAMIN D3) 125 MCG (5000 UT) capsule capsule Take 1 capsule by mouth Daily.      famotidine (PEPCID) 10 MG tablet Take 2 tablets by mouth Daily.      flecainide (TAMBOCOR) 100 MG tablet Take 1 tablet by mouth 2 (Two) Times a Day.      multivitamin (MULTI VITAMIN PO) Take  by mouth Daily.      PREBIOTIC PRODUCT PO Take  by mouth.      sildenafil (REVATIO) 20 MG tablet TAKE 2 TO 5 TABLETS BY MOUTH ONE HOUR PRIOR TO EVENT 20 tablet 2    meloxicam (MOBIC) 15 MG tablet TAKE 1 TABLET EVERY OTHER DAY 45 tablet 1    metoprolol succinate XL (TOPROL-XL) 25 MG 24 hr tablet TAKE 1 TABLET DAILY 90 tablet 1     No facility-administered medications prior to visit.       No opioid medication identified on active medication list. I have reviewed chart for other potential  high risk medication/s and harmful drug interactions in the elderly.        Aspirin is on active medication list. Aspirin use is indicated based on review of current medical condition/s. Pros and cons of this therapy have been  "discussed today. Benefits of this medication outweigh potential harm.  Patient has been encouraged to continue taking this medication.  .      Patient Active Problem List   Diagnosis    Atrial fibrillation with rapid ventricular response    Degeneration of intervertebral disc of lumbar region    Gastroesophageal reflux disease without esophagitis    Mixed hyperlipidemia    Insomnia    Primary osteoarthritis involving multiple joints    Seasonal allergic rhinitis    Other male erectile dysfunction    Glucose intolerance (impaired glucose tolerance)    Onychomycosis of right great toe    Benign nodular prostatic hyperplasia without lower urinary tract symptoms    Abdominal aortic aneurysm    Vitamin D deficiency    Essential hypertension     Advance Care Planning   Advance Care Planning     Advance Directive is not on file.  ACP discussion was held with the patient during this visit. Patient does not have an advance directive, information provided.     Objective    Vitals:    11/29/23 1035   BP: 120/84   Pulse: 65   Temp: 97.8 °F (36.6 °C)   SpO2: 97%   Weight: 89.8 kg (198 lb)   Height: 182.9 cm (72\")     Estimated body mass index is 26.85 kg/m² as calculated from the following:    Height as of this encounter: 182.9 cm (72\").    Weight as of this encounter: 89.8 kg (198 lb).    BMI is >= 25 and <30. (Overweight) The following options were offered after discussion;: exercise counseling/recommendations and nutrition counseling/recommendations      Does the patient have evidence of cognitive impairment? No    Lab Results   Component Value Date    CHLPL 192 11/17/2023    TRIG 94 11/17/2023    HDL 61 (H) 11/17/2023     (H) 11/17/2023    VLDL 17 11/17/2023    HGBA1C 5.80 (H) 11/17/2023        HEALTH RISK ASSESSMENT    Smoking Status:  Social History     Tobacco Use   Smoking Status Former    Packs/day: 0.25    Years: 2.00    Additional pack years: 0.00    Total pack years: 0.50    Types: Cigarettes    Quit date: " 1970    Years since quittin.9   Smokeless Tobacco Never     Alcohol Consumption:  Social History     Substance and Sexual Activity   Alcohol Use Yes     Fall Risk Screen:    ARTEMIO Fall Risk Assessment was completed, and patient is at LOW risk for falls.Assessment completed on:2023    Depression Screenin/29/2023    10:46 AM   PHQ-2/PHQ-9 Depression Screening   Little Interest or Pleasure in Doing Things 0-->not at all   Feeling Down, Depressed or Hopeless 0-->not at all   PHQ-9: Brief Depression Severity Measure Score 0       Health Habits and Functional and Cognitive Screenin/29/2023    10:45 AM   Functional & Cognitive Status   Do you have difficulty preparing food and eating? No   Do you have difficulty bathing yourself, getting dressed or grooming yourself? No   Do you have difficulty using the toilet? No   Do you have difficulty moving around from place to place? No   Do you have trouble with steps or getting out of a bed or a chair? No   Current Diet Well Balanced Diet   Dental Exam Up to date   Eye Exam Up to date   Exercise (times per week) 4 times per week   Current Exercises Include Walking;Yard Work;Other        Exercise Comment golf   Do you need help using the phone?  No   Are you deaf or do you have serious difficulty hearing?  No   Do you need help to go to places out of walking distance? No   Do you need help shopping? No   Do you need help preparing meals?  No   Do you need help with housework?  No   Do you need help with laundry? No   Do you need help taking your medications? No   Do you need help managing money? No   Do you ever drive or ride in a car without wearing a seat belt? No   Have you felt unusual stress, anger or loneliness in the last month? No   Who do you live with? Spouse   If you need help, do you have trouble finding someone available to you? No   Have you been bothered in the last four weeks by sexual problems? No   Do you have difficulty  concentrating, remembering or making decisions? No       Age-appropriate Screening Schedule:  Refer to the list below for future screening recommendations based on patient's age, sex and/or medical conditions. Orders for these recommended tests are listed in the plan section. The patient has been provided with a written plan.    Health Maintenance   Topic Date Due    ZOSTER VACCINE (2 of 3) 01/07/2014    HEPATITIS C SCREENING  Never done    Pneumococcal Vaccine 65+ (3 - PPSV23 or PCV20) 11/02/2022    TDAP/TD VACCINES (2 - Td or Tdap) 11/13/2022    BMI FOLLOWUP  11/21/2023    URINE MICROALBUMIN  06/05/2030 (Originally 11/13/2020)    DIABETIC FOOT EXAM  11/18/2030 (Originally 11/13/2020)    COLORECTAL CANCER SCREENING  03/25/2024    HEMOGLOBIN A1C  05/17/2024    DIABETIC EYE EXAM  07/27/2024    LIPID PANEL  11/17/2024    ANNUAL WELLNESS VISIT  11/29/2024    COVID-19 Vaccine  Completed    INFLUENZA VACCINE  Completed    AAA SCREEN (ONE-TIME)  Completed                  CMS Preventative Services Quick Reference  Risk Factors Identified During Encounter  Immunizations Discussed/Encouraged: Tdap, Influenza, and Shingrix  Polypharmacy: Medication List reviewed  Dental Screening Recommended  Vision Screening Recommended  The above risks/problems have been discussed with the patient.  Pertinent information has been shared with the patient in the After Visit Summary.  An After Visit Summary and PPPS were made available to the patient.    Follow Up:   Next Medicare Wellness visit to be scheduled in 1 year.       Additional E&M Note during same encounter follows:  Patient has multiple medical problems which are significant and separately identifiable that require additional work above and beyond the Medicare Wellness Visit.      Chief Complaint  Medicare Wellness-subsequent    Subjective        HPI  Alfred Vazquez is also being seen today for 68-year-old white male here for subsequent Medicare wellness visit as well as follow-up  "in regards to several medical issues such as hypertension, hyperlipidemia and atrial fibrillation with rapid ventricular response.  There is also history of glucose intolerance and vitamin D deficiency.  Patient also suffers from erectile dysfunction and has osteoarthritis at multiple levels.  Current medications include Pepcid, flecainide, meloxicam as well as Toprol-XL.  Patient also uses sildenafil on an as-needed basis he does use a host of supplements and vitamins including vitamin D3.  Fasting labs have been acquired prior to this visit.  All medications and supplements are used appropriately and are well-tolerated.    Review of Systems   Cardiovascular:  Positive for palpitations.        Atrial fibrillation   Genitourinary:         Erectile dysfunction   Musculoskeletal:  Positive for arthralgias.       Objective   Vital Signs:  /84   Pulse 65   Temp 97.8 °F (36.6 °C)   Ht 182.9 cm (72\")   Wt 89.8 kg (198 lb)   SpO2 97%   BMI 26.85 kg/m²     Physical Exam  Vitals and nursing note reviewed.   Constitutional:       Appearance: Normal appearance. He is well-developed and well-groomed.   HENT:      Head: Normocephalic and atraumatic.   Neck:      Thyroid: No thyroid mass or thyromegaly.      Vascular: Normal carotid pulses. No carotid bruit.      Trachea: Trachea and phonation normal.   Cardiovascular:      Rate and Rhythm: Normal rate and regular rhythm.      Heart sounds: Normal heart sounds. No murmur heard.     No friction rub. No gallop.   Pulmonary:      Effort: Pulmonary effort is normal. No respiratory distress.      Breath sounds: Normal breath sounds. No decreased breath sounds, wheezing, rhonchi or rales.   Musculoskeletal:      Cervical back: Neck supple.   Lymphadenopathy:      Cervical: No cervical adenopathy.   Skin:     General: Skin is warm and dry.      Findings: No rash.   Neurological:      Mental Status: He is alert and oriented to person, place, and time.   Psychiatric:         " Attention and Perception: Attention and perception normal.         Mood and Affect: Mood and affect normal.         Speech: Speech normal.         Behavior: Behavior normal. Behavior is cooperative.         Thought Content: Thought content normal.         Cognition and Memory: Cognition and memory normal.         Judgment: Judgment normal.        Orders Only on 11/16/2023   Component Date Value Ref Range Status    WBC 11/17/2023 4.09  3.40 - 10.80 10*3/mm3 Final    RBC 11/17/2023 4.58  4.14 - 5.80 10*6/mm3 Final    Hemoglobin 11/17/2023 15.0  13.0 - 17.7 g/dL Final    Hematocrit 11/17/2023 44.3  37.5 - 51.0 % Final    MCV 11/17/2023 96.7  79.0 - 97.0 fL Final    MCH 11/17/2023 32.8  26.6 - 33.0 pg Final    MCHC 11/17/2023 33.9  31.5 - 35.7 g/dL Final    RDW 11/17/2023 11.9 (L)  12.3 - 15.4 % Final    Platelets 11/17/2023 200  140 - 450 10*3/mm3 Final    Neutrophil Rel % 11/17/2023 48.7  42.7 - 76.0 % Final    Lymphocyte Rel % 11/17/2023 35.7  19.6 - 45.3 % Final    Monocyte Rel % 11/17/2023 11.2  5.0 - 12.0 % Final    Eosinophil Rel % 11/17/2023 3.2  0.3 - 6.2 % Final    Basophil Rel % 11/17/2023 1.0  0.0 - 1.5 % Final    Neutrophils Absolute 11/17/2023 1.99  1.70 - 7.00 10*3/mm3 Final    Lymphocytes Absolute 11/17/2023 1.46  0.70 - 3.10 10*3/mm3 Final    Monocytes Absolute 11/17/2023 0.46  0.10 - 0.90 10*3/mm3 Final    Eosinophils Absolute 11/17/2023 0.13  0.00 - 0.40 10*3/mm3 Final    Basophils Absolute 11/17/2023 0.04  0.00 - 0.20 10*3/mm3 Final    Immature Granulocyte Rel % 11/17/2023 0.2  0.0 - 0.5 % Final    Immature Grans Absolute 11/17/2023 0.01  0.00 - 0.05 10*3/mm3 Final    nRBC 11/17/2023 0.0  0.0 - 0.2 /100 WBC Final    25 Hydroxy, Vitamin D 11/17/2023 98.7  30.0 - 100.0 ng/ml Final    Comment: Reference Range for Total Vitamin D 25(OH)  Deficiency <20.0 ng/mL  Insufficiency 21-29 ng/mL  Sufficiency  ng/mL  Toxicity >100 ng/ml      Total Cholesterol 11/17/2023 192  0 - 200 mg/dL Final    Comment:  Cholesterol Reference Ranges  (U.S. Department of Health and Human Services ATP III  Classifications)  Desirable          <200 mg/dL  Borderline High    200-239 mg/dL  High Risk          >240 mg/dL  Triglyceride Reference Ranges  (U.S. Department of Health and Human Services ATP III  Classifications)  Normal           <150 mg/dL  Borderline High  150-199 mg/dL  High             200-499 mg/dL  Very High        >500 mg/dL  HDL Reference Ranges  (U.S. Department of Health and Human Services ATP III  Classifications)  Low     <40 mg/dl (major risk factor for CHD)  High    >60 mg/dl ('negative' risk factor for CHD)  LDL Reference Ranges  (U.S. Department of Health and Human Services ATP III  Classifications)  Optimal          <100 mg/dL  Near Optimal     100-129 mg/dL  Borderline High  130-159 mg/dL  High             160-189 mg/dL  Very High        >189 mg/dL      Triglycerides 11/17/2023 94  0 - 150 mg/dL Final    HDL Cholesterol 11/17/2023 61 (H)  40 - 60 mg/dL Final    VLDL Cholesterol Ronni 11/17/2023 17  5 - 40 mg/dL Final    LDL Chol Calc (NIH) 11/17/2023 114 (H)  0 - 100 mg/dL Final    PSA 11/17/2023 0.396  0.000 - 4.000 ng/mL Final    Comment: Results may be falsely decreased if patient taking Biotin.  Testing Method: [employer] Electrochemiluminescence  Immunoassay(ECLIA)  Values obtained with different assay methods or kits cannot  be used interchangeably.      Glucose 11/17/2023 98  65 - 99 mg/dL Final    BUN 11/17/2023 29 (H)  8 - 23 mg/dL Final    Creatinine 11/17/2023 1.05  0.76 - 1.27 mg/dL Final    EGFR Result 11/17/2023 76.4  >60.0 mL/min/1.73 Final    Comment: GFR Normal >60  Chronic Kidney Disease <60  Kidney Failure <15      BUN/Creatinine Ratio 11/17/2023 27.6 (H)  7.0 - 25.0 Final    Sodium 11/17/2023 137  136 - 145 mmol/L Final    Potassium 11/17/2023 5.0  3.5 - 5.2 mmol/L Final    Chloride 11/17/2023 101  98 - 107 mmol/L Final    Total CO2 11/17/2023 29.7 (H)  22.0 - 29.0 mmol/L Final     Calcium 11/17/2023 9.3  8.6 - 10.5 mg/dL Final    Total Protein 11/17/2023 6.2  6.0 - 8.5 g/dL Final    Albumin 11/17/2023 4.6  3.5 - 5.2 g/dL Final    Globulin 11/17/2023 1.6  gm/dL Final    A/G Ratio 11/17/2023 2.9  g/dL Final    Total Bilirubin 11/17/2023 0.8  0.0 - 1.2 mg/dL Final    Alkaline Phosphatase 11/17/2023 57  39 - 117 U/L Final    AST (SGOT) 11/17/2023 20  1 - 40 U/L Final    ALT (SGPT) 11/17/2023 22  1 - 41 U/L Final    Hemoglobin A1C 11/17/2023 5.80 (H)  4.80 - 5.60 % Final    Comment: Hemoglobin A1C Ranges:  Increased Risk for Diabetes  5.7% to 6.4%  Diabetes                     >= 6.5%  Diabetic Goal                < 7.0%           The following data was reviewed by: Julio Watson DO on 11/29/2023:  Common labs          5/17/2023    08:36 11/17/2023    08:42   Common Labs   Glucose 95  98    BUN 26  29    Creatinine 1.00  1.05    Sodium 143  137    Potassium 4.6  5.0    Chloride 105  101    Calcium 9.5  9.3    Total Protein 6.1  6.2    Albumin 4.5  4.6    Total Bilirubin 0.7  0.8    Alkaline Phosphatase 65  57    AST (SGOT) 22  20    ALT (SGPT) 21  22    WBC 4.16  4.09    Hemoglobin 14.9  15.0    Hematocrit 44.2  44.3    Platelets 189  200    Total Cholesterol 188  192    Triglycerides 89  94    HDL Cholesterol 62  61    LDL Cholesterol  110  114    Hemoglobin A1C 5.70  5.80    PSA  0.396      Data reviewed : GI studies colonoscopy           Assessment and Plan   Diagnoses and all orders for this visit:    1. Medicare annual wellness visit, subsequent (Primary)  -     Lipid panel; Future  -     Comprehensive metabolic panel; Future  -     PSA DIAGNOSTIC ONLY; Future  -     TSH; Future  -     Hemoglobin A1c; Future  -     Vitamin D 25 hydroxy; Future  -     CBC w AUTO Differential; Future    2. Atrial fibrillation with rapid ventricular response  -     metoprolol succinate XL (TOPROL-XL) 25 MG 24 hr tablet; Take 1 tablet by mouth Daily.  Dispense: 90 tablet; Refill: 1  -     Lipid panel; Future  -      Comprehensive metabolic panel; Future  -     PSA DIAGNOSTIC ONLY; Future  -     TSH; Future  -     Hemoglobin A1c; Future  -     Vitamin D 25 hydroxy; Future  -     CBC w AUTO Differential; Future    3. Mixed hyperlipidemia  -     Lipid panel; Future  -     Comprehensive metabolic panel; Future  -     PSA DIAGNOSTIC ONLY; Future  -     TSH; Future  -     Hemoglobin A1c; Future  -     Vitamin D 25 hydroxy; Future  -     CBC w AUTO Differential; Future    4. Glucose intolerance (impaired glucose tolerance)  -     Lipid panel; Future  -     Comprehensive metabolic panel; Future  -     PSA DIAGNOSTIC ONLY; Future  -     TSH; Future  -     Hemoglobin A1c; Future  -     Vitamin D 25 hydroxy; Future  -     CBC w AUTO Differential; Future    5. Vitamin D deficiency  -     Lipid panel; Future  -     Comprehensive metabolic panel; Future  -     PSA DIAGNOSTIC ONLY; Future  -     TSH; Future  -     Hemoglobin A1c; Future  -     Vitamin D 25 hydroxy; Future  -     CBC w AUTO Differential; Future    6. Gastroesophageal reflux disease without esophagitis  -     Lipid panel; Future  -     Comprehensive metabolic panel; Future  -     PSA DIAGNOSTIC ONLY; Future  -     TSH; Future  -     Hemoglobin A1c; Future  -     Vitamin D 25 hydroxy; Future  -     CBC w AUTO Differential; Future    7. Other male erectile dysfunction  -     Lipid panel; Future  -     Comprehensive metabolic panel; Future  -     PSA DIAGNOSTIC ONLY; Future  -     TSH; Future  -     Hemoglobin A1c; Future  -     Vitamin D 25 hydroxy; Future  -     CBC w AUTO Differential; Future    8. Primary osteoarthritis involving multiple joints  -     meloxicam (MOBIC) 15 MG tablet; Take 1 tablet by mouth Every Other Day.  Dispense: 45 tablet; Refill: 1  -     Lipid panel; Future  -     Comprehensive metabolic panel; Future  -     PSA DIAGNOSTIC ONLY; Future  -     TSH; Future  -     Hemoglobin A1c; Future  -     Vitamin D 25 hydroxy; Future  -     CBC w AUTO  Differential; Future    9. Nocturia  -     PSA DIAGNOSTIC ONLY; Future      Patient has been asked to lower cholesterol in his diet with an LDL goal of 100 or less.  Weight loss would be beneficial in regards to sugar status.  Anticipate follow-up in 6 months preceded by fasting labs.     I spent 30 minutes caring for Alfred on this date of service. This time includes time spent by me in the following activities:preparing for the visit, reviewing tests, obtaining and/or reviewing a separately obtained history, performing a medically appropriate examination and/or evaluation , counseling and educating the patient/family/caregiver, ordering medications, tests, or procedures, referring and communicating with other health care professionals , documenting information in the medical record, independently interpreting results and communicating that information with the patient/family/caregiver, and care coordination  Follow Up   Return in about 6 months (around 5/29/2024) for Recheck.  Patient was given instructions and counseling regarding his condition or for health maintenance advice. Please see specific information pulled into the AVS if appropriate.

## 2024-01-24 ENCOUNTER — OFFICE VISIT (OUTPATIENT)
Dept: FAMILY MEDICINE CLINIC | Facility: CLINIC | Age: 69
End: 2024-01-24
Payer: MEDICARE

## 2024-01-24 ENCOUNTER — HOSPITAL ENCOUNTER (OUTPATIENT)
Dept: GENERAL RADIOLOGY | Facility: HOSPITAL | Age: 69
Discharge: HOME | End: 2024-01-24
Admitting: FAMILY MEDICINE
Payer: MEDICARE

## 2024-01-24 VITALS
TEMPERATURE: 97.1 F | SYSTOLIC BLOOD PRESSURE: 120 MMHG | DIASTOLIC BLOOD PRESSURE: 80 MMHG | WEIGHT: 204 LBS | BODY MASS INDEX: 27.63 KG/M2 | OXYGEN SATURATION: 95 % | HEIGHT: 72 IN | HEART RATE: 63 BPM

## 2024-01-24 DIAGNOSIS — R10.10 PAIN OF UPPER ABDOMEN: ICD-10-CM

## 2024-01-24 DIAGNOSIS — M25.552 PAIN OF LEFT HIP: Primary | ICD-10-CM

## 2024-01-24 PROCEDURE — 1160F RVW MEDS BY RX/DR IN RCRD: CPT | Performed by: FAMILY MEDICINE

## 2024-01-24 PROCEDURE — 3074F SYST BP LT 130 MM HG: CPT | Performed by: FAMILY MEDICINE

## 2024-01-24 PROCEDURE — 99214 OFFICE O/P EST MOD 30 MIN: CPT | Performed by: FAMILY MEDICINE

## 2024-01-24 PROCEDURE — 3079F DIAST BP 80-89 MM HG: CPT | Performed by: FAMILY MEDICINE

## 2024-01-24 PROCEDURE — 1159F MED LIST DOCD IN RCRD: CPT | Performed by: FAMILY MEDICINE

## 2024-01-24 PROCEDURE — 73502 X-RAY EXAM HIP UNI 2-3 VIEWS: CPT

## 2024-01-24 RX ORDER — FLUOROURACIL 50 MG/G
CREAM TOPICAL
COMMUNITY
Start: 2024-01-17

## 2024-01-25 NOTE — PROGRESS NOTES
Subjective   Alfred Vazquez is a 68 y.o. male with   Chief Complaint   Patient presents with    Abdominal Pain     Pt stated it feels like a burn and last for about 1-2 hours    Hip Pain     left   .    Abdominal Pain  Associated symptoms include arthralgias.   Hip Pain        68-year-old white male with complaint of episodic upper abdominal pain that is somewhat burning in nature.  Patient denies any correlation with eating or types of food.  This situation has come and gone for the last 1 to 2 years.  Appetite is normal and there has been no nausea/vomiting or diarrhea.  Patient denies constipation, melena or hematochezia.  This has been evaluated in the past on at least 2 different occasions with ultrasounds obtained and at least in 1 occasion a HIDA scan obtained.  Workup to date has been unremarkable.  Last colonoscopy was 5 years ago and patient states that he is due.  He has seen Dr. Williamson in the past who is performed these procedures.  He also complains of left hip pain.  This arises after he has been sitting for a period of time and stands up-has difficulty ambulating initially but once walks a distance his left hip seems to be improved.  There is been no trauma or initiating event.  Patient denies lumbar pain and there has been no lumbar radicular symptoms.  The following portions of the patient's history were reviewed and updated as appropriate: allergies, current medications, past family history, past medical history, past social history, past surgical history and problem list.    Review of Systems   Gastrointestinal:  Positive for abdominal pain.   Musculoskeletal:  Positive for arthralgias.       Objective     Vitals:    01/24/24 1402   BP: 120/80   Pulse: 63   Temp: 97.1 °F (36.2 °C)   SpO2: 95%       No results found for this or any previous visit (from the past 672 hour(s)).    Physical Exam  Vitals and nursing note reviewed.   Constitutional:       Appearance: Normal appearance. He is  well-developed and well-groomed.   HENT:      Head: Normocephalic and atraumatic.   Neck:      Thyroid: No thyroid mass or thyromegaly.      Vascular: Normal carotid pulses. No carotid bruit.      Trachea: Trachea and phonation normal.   Cardiovascular:      Rate and Rhythm: Normal rate and regular rhythm.      Heart sounds: Normal heart sounds. No murmur heard.     No friction rub. No gallop.   Pulmonary:      Effort: Pulmonary effort is normal. No respiratory distress.      Breath sounds: Normal breath sounds. No decreased breath sounds, wheezing, rhonchi or rales.   Abdominal:      General: Abdomen is flat. Bowel sounds are normal. There is no distension.      Palpations: Abdomen is soft. There is no hepatomegaly, splenomegaly or mass.      Tenderness: There is no abdominal tenderness. There is no right CVA tenderness, left CVA tenderness, guarding or rebound.      Hernia: No hernia is present.   Musculoskeletal:      Cervical back: Neck supple.      Comments: Left hip with full range of motion in all planes.  Motor 5/5 neurovascular tact distally left lower extremity with good tone.  SLR is negative at 60 degrees bilaterally with Joe that is full and nontender.  No evidence of greater trochanteric tenderness with palpation.   Lymphadenopathy:      Cervical: No cervical adenopathy.   Skin:     General: Skin is warm and dry.      Findings: No rash.   Neurological:      Mental Status: He is alert and oriented to person, place, and time.   Psychiatric:         Attention and Perception: Attention and perception normal.         Mood and Affect: Mood and affect normal.         Speech: Speech normal.         Behavior: Behavior normal. Behavior is cooperative.         Thought Content: Thought content normal.         Cognition and Memory: Cognition and memory normal.         Judgment: Judgment normal.         Assessment & Plan   Diagnoses and all orders for this visit:    1. Pain of left hip (Primary)  -     XR Hip With  or Without Pelvis 2 - 3 View Left    2. Pain of upper abdomen  -     Ambulatory Referral to Gastroenterology        Return if symptoms worsen or fail to improve, for Next scheduled follow up.

## 2024-01-26 ENCOUNTER — TELEPHONE (OUTPATIENT)
Dept: GASTROENTEROLOGY | Facility: CLINIC | Age: 69
End: 2024-01-26

## 2024-01-26 NOTE — TELEPHONE ENCOUNTER
Hub staff attempted to follow warm transfer process and was unsuccessful     Caller: Alfred Vazquez    Relationship to patient: Self    Best call back number: [phone]    Patient is needing: PT CALLED TO SCHEDULE FOLLOW UP WITH PEDRO PABLO. PLEASE REACH OUT TO PT. THANK YOU.

## 2024-01-29 DIAGNOSIS — M25.552 LEFT HIP PAIN: Primary | ICD-10-CM

## 2024-02-12 ENCOUNTER — TREATMENT (OUTPATIENT)
Dept: PHYSICAL THERAPY | Facility: CLINIC | Age: 69
End: 2024-02-12
Payer: MEDICARE

## 2024-02-12 DIAGNOSIS — M25.652 HIP STIFFNESS, LEFT: ICD-10-CM

## 2024-02-12 DIAGNOSIS — M25.552 LEFT HIP PAIN: Primary | ICD-10-CM

## 2024-02-12 PROCEDURE — 97530 THERAPEUTIC ACTIVITIES: CPT | Performed by: PHYSICAL THERAPIST

## 2024-02-12 PROCEDURE — 97110 THERAPEUTIC EXERCISES: CPT | Performed by: PHYSICAL THERAPIST

## 2024-02-12 PROCEDURE — 97161 PT EVAL LOW COMPLEX 20 MIN: CPT | Performed by: PHYSICAL THERAPIST

## 2024-02-12 PROCEDURE — 97035 APP MDLTY 1+ULTRASOUND EA 15: CPT | Performed by: PHYSICAL THERAPIST

## 2024-02-19 ENCOUNTER — TREATMENT (OUTPATIENT)
Dept: PHYSICAL THERAPY | Facility: CLINIC | Age: 69
End: 2024-02-19
Payer: MEDICARE

## 2024-02-19 DIAGNOSIS — M25.552 LEFT HIP PAIN: Primary | ICD-10-CM

## 2024-02-19 DIAGNOSIS — M25.652 HIP STIFFNESS, LEFT: ICD-10-CM

## 2024-02-19 PROCEDURE — 97530 THERAPEUTIC ACTIVITIES: CPT | Performed by: PHYSICAL THERAPIST

## 2024-02-19 PROCEDURE — 97110 THERAPEUTIC EXERCISES: CPT | Performed by: PHYSICAL THERAPIST

## 2024-02-19 NOTE — PROGRESS NOTES
Physical Therapy Daily Treatment Note    [facility]  [address], [place] [id number] [phone] (phone)  [phone] (fax)    Patient: Alfred Vazquez   : 1955  Diagnosis/ICD-10 Code:  Left hip pain [M25.552]  Referring practitioner: Julio Watson DO  Date of Initial Visit: Type: THERAPY  Noted: 2024  Today's Date: 2024  Patient seen for 2 sessions           Subjective   Patient reports that his pain is a little better and the stretches seem to be helping.    Objective     See Exercise, Manual, and Modality Logs for complete treatment.     Assessment/Plan  Patient reports good compliance with HEP as well as a reduction in his hip pain. Added LTR to help promote lumbar mobility as well as SLR and pelvic tilts to help promote better core and hip stability. Encouraged patient to continue with current exercise program and return to PT for modification or progression of program as needed.            Timed:    Manual Therapy:         mins  31145;  Therapeutic Exercise:    30     mins  19085;     Neuromuscular Geovanna:        mins  28876;    Therapeutic Activity:     10     mins  98224;     Gait Training:           mins  66416;     Ultrasound:          mins  68026;    Electrical Stimulation:         mins  42581 ( );  Iontophoresis         mins 20431;  Aquatic Therapy         mins 54110;    Untimed:  Electrical Stimulation:         mins  99100 ( );  Traction:         mins  10040;   Dry Needling   (1-2 muscles)       mins 58478 (Self-pay)  Dry Needling (3-4 muscles)        mins  (Self-pay)  Dry Needling Trial          mins DRYNDLTRIAL  (No Charge)    Timed Treatment:   40   mins   Total Treatment:     40   mins    Veronika Tomlinson, PT  Physical Therapist    [place] License:[id number]

## 2024-02-22 ENCOUNTER — OFFICE VISIT (OUTPATIENT)
Dept: GASTROENTEROLOGY | Facility: CLINIC | Age: 69
End: 2024-02-22
Payer: MEDICARE

## 2024-02-22 VITALS
HEART RATE: 61 BPM | BODY MASS INDEX: 27.6 KG/M2 | HEIGHT: 72 IN | DIASTOLIC BLOOD PRESSURE: 75 MMHG | SYSTOLIC BLOOD PRESSURE: 116 MMHG | OXYGEN SATURATION: 98 % | TEMPERATURE: 97.6 F | WEIGHT: 203.8 LBS | RESPIRATION RATE: 16 BRPM

## 2024-02-22 DIAGNOSIS — R10.13 EPIGASTRIC PAIN: ICD-10-CM

## 2024-02-22 DIAGNOSIS — K63.5 POLYP OF COLON, UNSPECIFIED PART OF COLON, UNSPECIFIED TYPE: ICD-10-CM

## 2024-02-22 DIAGNOSIS — K21.9 GASTROESOPHAGEAL REFLUX DISEASE, UNSPECIFIED WHETHER ESOPHAGITIS PRESENT: Primary | ICD-10-CM

## 2024-02-22 PROCEDURE — 3074F SYST BP LT 130 MM HG: CPT | Performed by: INTERNAL MEDICINE

## 2024-02-22 PROCEDURE — 1159F MED LIST DOCD IN RCRD: CPT | Performed by: INTERNAL MEDICINE

## 2024-02-22 PROCEDURE — 3078F DIAST BP <80 MM HG: CPT | Performed by: INTERNAL MEDICINE

## 2024-02-22 PROCEDURE — 99203 OFFICE O/P NEW LOW 30 MIN: CPT | Performed by: INTERNAL MEDICINE

## 2024-02-22 PROCEDURE — 1160F RVW MEDS BY RX/DR IN RCRD: CPT | Performed by: INTERNAL MEDICINE

## 2024-02-22 NOTE — PROGRESS NOTES
Chief Complaint   Patient presents with    Abdominal Pain        Alfred Vazquez is a  68 y.o. male here for an initial visit for upper abdominal pain    HPI this 68-year-old male patient of Dr. Julio Watson presents with a history of epigastric pain at least for a year or more.  He describes a burning sensation with radiation to the left upper quadrant.  This usually occurs at night while he is asleep and can awaken him for approximately 2 hours before it subsides.  It is not associated with nausea or vomiting and he denies any dysphagia.  He has issues with reflux which is addressed with Pepcid.  No prior endoscopy of his upper GI tract noted.  He has had previous colonoscopic examinations through this office the most recent being in 2019.  He is due for follow-up colonoscopy at this time.  He has a personal history of polyps which warrants evaluation every 5 years.    Past Medical History:   Diagnosis Date    Allergic rhinitis     Arthritis     Atrial fibrillation     Benign prostatic hypertrophy     DDD (degenerative disc disease), cervical     C-SPINE    ED (erectile dysfunction) of non-organic origin     Glucose intolerance (malabsorption)     Lateral epicondylitis of right elbow     Obesity     Patellar tendinitis of left knee     Precordial chest pain     Urethritis        Current Outpatient Medications   Medication Sig Dispense Refill    aspirin 81 MG chewable tablet Chew 1 tablet Daily.      CALCIUM CARBONATE-VITAMIN D PO Take  by mouth.      Cholecalciferol (VITAMIN D3) 125 MCG (5000 UT) capsule capsule Take 1 capsule by mouth Daily.      famotidine (PEPCID) 10 MG tablet Take 2 tablets by mouth Daily.      flecainide (TAMBOCOR) 100 MG tablet Take 1 tablet by mouth 2 (Two) Times a Day.      meloxicam (MOBIC) 15 MG tablet Take 1 tablet by mouth Every Other Day. 45 tablet 1    metoprolol succinate XL (TOPROL-XL) 25 MG 24 hr tablet Take 1 tablet by mouth Daily. 90 tablet 1    multivitamin (MULTI VITAMIN PO)  Take  by mouth Daily.      PREBIOTIC PRODUCT PO Take  by mouth.      sildenafil (REVATIO) 20 MG tablet TAKE 2 TO 5 TABLETS BY MOUTH ONE HOUR PRIOR TO EVENT 20 tablet 2    fluorouracil (EFUDEX) 5 % cream Apply thin layer nightly to affected areas for up to 4 weeks or until redness and inflammation. Wash hands off after application well. Stop       No current facility-administered medications for this visit.       PRN Meds:.    Allergies   Allergen Reactions    Codeine Nausea Only and Nausea And Vomiting       Social History     Socioeconomic History    Marital status:    Tobacco Use    Smoking status: Former     Packs/day: 0.25     Years: 2.00     Additional pack years: 0.00     Total pack years: 0.50     Types: Cigarettes     Quit date: 1970     Years since quittin.1    Smokeless tobacco: Never    Tobacco comments:     Smoked as a teenager   Vaping Use    Vaping Use: Never used   Substance and Sexual Activity    Alcohol use: Not Currently    Drug use: No    Sexual activity: Yes     Partners: Female     Birth control/protection: Vasectomy       Family History   Problem Relation Age of Onset    No Known Problems Mother     Aneurysm Father         AORTIC    Other Father         EYE PROBLEMS, TOTAL KNEE REPLACEMENT    Lung cancer Father     Obesity Sister     Obesity Brother     Stroke Other     Diabetes Other     Suicide Attempts Other        Review of Systems   Constitutional:  Negative for activity change, appetite change, fatigue and unexpected weight change.   HENT:  Negative for congestion, facial swelling, sore throat, trouble swallowing and voice change.    Eyes:  Negative for photophobia and visual disturbance.   Respiratory:  Negative for cough and choking.    Cardiovascular:  Negative for chest pain.   Gastrointestinal:  Positive for abdominal pain. Negative for abdominal distention, anal bleeding, blood in stool, constipation, diarrhea, nausea, rectal pain and vomiting.   Endocrine: Negative  for polyphagia.   Musculoskeletal:  Negative for arthralgias, gait problem and joint swelling.   Skin:  Negative for color change, pallor and rash.   Allergic/Immunologic: Negative for food allergies.   Neurological:  Negative for speech difficulty and headaches.   Hematological:  Does not bruise/bleed easily.   Psychiatric/Behavioral:  Negative for agitation, confusion and sleep disturbance.        Vitals:    02/22/24 1418   BP: 116/75   Pulse: 61   Resp: 16   Temp: 97.6 °F (36.4 °C)   SpO2: 98%       Physical Exam  Vitals reviewed.   Constitutional:       General: He is not in acute distress.     Appearance: He is well-developed. He is not diaphoretic.   HENT:      Head: Normocephalic.      Mouth/Throat:      Pharynx: No oropharyngeal exudate.   Eyes:      General: No scleral icterus.     Conjunctiva/sclera: Conjunctivae normal.   Neck:      Thyroid: No thyromegaly.   Cardiovascular:      Rate and Rhythm: Normal rate and regular rhythm.      Heart sounds: No murmur heard.  Pulmonary:      Effort: No respiratory distress.      Breath sounds: Normal breath sounds. No wheezing or rales.   Abdominal:      General: Bowel sounds are normal. There is no distension.      Palpations: Abdomen is soft. There is no mass.      Tenderness: There is no abdominal tenderness.   Genitourinary:     Rectum: Guaiac result negative.   Musculoskeletal:         General: No tenderness. Normal range of motion.      Cervical back: Normal range of motion.   Lymphadenopathy:      Cervical: No cervical adenopathy.   Skin:     General: Skin is warm and dry.      Findings: No erythema or rash.   Neurological:      Mental Status: He is alert and oriented to person, place, and time.   Psychiatric:         Behavior: Behavior normal.         ASSESSMENT   #1 GERD  #2 epigastric pain  #3 history of polyps      PLAN  Schedule EGD and colonoscopy      ICD-10-CM ICD-9-CM   1. Gastroesophageal reflux disease, unspecified whether esophagitis present  K21.9  530.81   2. Family history of GI malignancy  Z80.0 V16.0   3. Family history of polyps in the colon  Z83.719 V18.51

## 2024-03-13 ENCOUNTER — DOCUMENTATION (OUTPATIENT)
Dept: PHYSICAL THERAPY | Facility: CLINIC | Age: 69
End: 2024-03-13
Payer: MEDICARE

## 2024-03-13 DIAGNOSIS — M25.652 HIP STIFFNESS, LEFT: ICD-10-CM

## 2024-03-13 DIAGNOSIS — M25.552 LEFT HIP PAIN: Primary | ICD-10-CM

## 2024-03-13 NOTE — PROGRESS NOTES
Discharge Summary  Discharge Summary from Physical Therapy Report      Dates  PT visit: 2/12/24-2/19/24  Number of Visits: 2       Goals: Partially Met    Goals  Plan Goals: STGs to be completed within 30 days:  -Patient will demonstrate compliance and independence with initial HEP (MET)    REMAINING GOALS- UNABLE TO RE-TEST  -Patient will increase L hip flexion AROM to 105 degrees or more to reduce pain when bending forward or getting in/out of car  -Patient will report 50% reduction in stiffness when transitioning from sitting to standing     LTGs to be completed within 90 days:  -Patient will report 75% reduction in stiffness when transitioning from sitting to standing  -Patient will improve score on LEFS from 59 at eval to 70 or greater to improve quality of life  -Patient will increase L hip flexion AROM to 110 degrees or more to reduce pain when bending forward or getting in/out of car  -Patient will increase L hip abduction strength to 4+/5 or more to improve hip stability with gait.     Discharge Plan: Continue with current home exercise program as instructed      Date of Discharge : 3/13/24        Veronika Tomlinson, PT  Physical Therapist

## 2024-05-06 ENCOUNTER — TELEPHONE (OUTPATIENT)
Dept: GASTROENTEROLOGY | Facility: CLINIC | Age: 69
End: 2024-05-06

## 2024-05-06 NOTE — TELEPHONE ENCOUNTER
Provider: DR CHAMORRO    Caller: AB UGARTE    Relationship to Patient: SELF    Pharmacy:     Phone Number: [phone]     Reason for Call: PT IS WANTING TO KNOW ABOUT AN AVERAGE TIME THE PROCEDURE WILL TAKE, HE WILL HAVE TO GET A SITTER FOR ANIMALS.   PT WANTED TO KNOW WHEN CAN START BACK EATING AGAIN AFTER THE PROCEDURE.

## 2024-05-08 ENCOUNTER — TELEPHONE (OUTPATIENT)
Dept: GASTROENTEROLOGY | Facility: CLINIC | Age: 69
End: 2024-05-08
Payer: MEDICARE

## 2024-05-13 ENCOUNTER — OUTSIDE FACILITY SERVICE (OUTPATIENT)
Dept: GASTROENTEROLOGY | Facility: CLINIC | Age: 69
End: 2024-05-13
Payer: MEDICARE

## 2024-05-20 ENCOUNTER — LAB REQUISITION (OUTPATIENT)
Dept: LAB | Facility: HOSPITAL | Age: 69
End: 2024-05-20
Payer: MEDICARE

## 2024-05-20 ENCOUNTER — OUTSIDE FACILITY SERVICE (OUTPATIENT)
Dept: GASTROENTEROLOGY | Facility: CLINIC | Age: 69
End: 2024-05-20
Payer: MEDICARE

## 2024-05-20 DIAGNOSIS — Z86.010 HISTORY OF COLON POLYPS: ICD-10-CM

## 2024-05-20 PROCEDURE — 88305 TISSUE EXAM BY PATHOLOGIST: CPT | Performed by: INTERNAL MEDICINE

## 2024-05-21 DIAGNOSIS — I48.91 ATRIAL FIBRILLATION WITH RAPID VENTRICULAR RESPONSE: ICD-10-CM

## 2024-05-21 LAB
LAB AP CASE REPORT: NORMAL
PATH REPORT.FINAL DX SPEC: NORMAL
PATH REPORT.GROSS SPEC: NORMAL

## 2024-05-21 RX ORDER — METOPROLOL SUCCINATE 25 MG/1
25 TABLET, EXTENDED RELEASE ORAL DAILY
Qty: 90 TABLET | Refills: 3 | Status: SHIPPED | OUTPATIENT
Start: 2024-05-21

## 2024-05-22 ENCOUNTER — TELEPHONE (OUTPATIENT)
Dept: GASTROENTEROLOGY | Facility: CLINIC | Age: 69
End: 2024-05-22
Payer: MEDICARE

## 2024-05-22 DIAGNOSIS — K20.90 ESOPHAGITIS: ICD-10-CM

## 2024-05-22 DIAGNOSIS — R10.13 EPIGASTRIC PAIN: Primary | ICD-10-CM

## 2024-05-22 DIAGNOSIS — K22.10 ULCER OF ESOPHAGUS WITHOUT BLEEDING: ICD-10-CM

## 2024-05-22 RX ORDER — PANTOPRAZOLE SODIUM 40 MG/1
40 TABLET, DELAYED RELEASE ORAL DAILY
Qty: 90 TABLET | Refills: 3 | Status: SHIPPED | OUTPATIENT
Start: 2024-05-22

## 2024-05-22 NOTE — TELEPHONE ENCOUNTER
Placido Williamson MD  [facility] Clinical 1 Pool  K to call results, recommend continue current PPI.  Patient will need follow-up EGD in 3 months to assess ulcer healing.

## 2024-05-22 NOTE — TELEPHONE ENCOUNTER
Patient notified of results per pathology and recommendations and verbalized understanding    Pantoprazole sent to pharmacy, he is asking if he should take pepcid along with the pantoprazole daily?  He said he is waking up at night with his GERD, around midnight.  Advised he take the pantoprazole in the evening to help with this.    He is also taking meloxicam daily and wonders if this should be stopped?  He is asking if it is ok for him to take Tylenol 4 extra strength per day?    Orders placed for EGD in 3 months, message to  to call

## 2024-05-23 PROBLEM — R10.13 EPIGASTRIC PAIN: Status: ACTIVE | Noted: 2024-05-22

## 2024-05-23 PROBLEM — K20.90 ESOPHAGITIS: Status: ACTIVE | Noted: 2024-05-22

## 2024-05-23 PROBLEM — K22.10 ULCER OF ESOPHAGUS WITHOUT BLEEDING: Status: ACTIVE | Noted: 2024-05-22

## 2024-05-23 NOTE — TELEPHONE ENCOUNTER
DENISE FLETCHER SCHEDULED EGD FOR 9-6-24 ARRIVAL TIME 1PM, MAILED PREP INSTRUCTIONS TO VERIFIED ADDRESS ON FILE

## 2024-05-24 NOTE — TELEPHONE ENCOUNTER
Okay to take Pepcid at bedtime as this may be more effective at controlling nocturnal events, would try to minimize or avoid meloxicam if possible due to risk for ulcer development.  Per Dr Williamson         Called pt and left vm for pt to call back.

## 2024-05-24 NOTE — TELEPHONE ENCOUNTER
HUB STAFF ATTEMPTED TO FOLLOW WARM TRANSFER PROCESS BUT WAS UNSUCCESSFUL.     Caller: Alfred Vazquez    Relationship: Self    Best call back number: [phone]    What is the best time to reach you: ANYTIME     Who are you requesting to speak with (clinical staff, provider,  specific staff member): CLINICAL     Do you know the name of the person who called: ARACELY AMLODOVAR     What was the call regarding: MEDICATION SUGGESTIONS

## 2024-06-03 DIAGNOSIS — M15.9 PRIMARY OSTEOARTHRITIS INVOLVING MULTIPLE JOINTS: ICD-10-CM

## 2024-06-03 RX ORDER — MELOXICAM 15 MG/1
15 TABLET ORAL EVERY OTHER DAY
Qty: 45 TABLET | Refills: 0 | Status: SHIPPED | OUTPATIENT
Start: 2024-06-03 | End: 2024-06-05

## 2024-06-05 ENCOUNTER — OFFICE VISIT (OUTPATIENT)
Dept: FAMILY MEDICINE CLINIC | Facility: CLINIC | Age: 69
End: 2024-06-05
Payer: MEDICARE

## 2024-06-05 VITALS
OXYGEN SATURATION: 97 % | DIASTOLIC BLOOD PRESSURE: 76 MMHG | TEMPERATURE: 97.3 F | HEIGHT: 72 IN | WEIGHT: 199 LBS | SYSTOLIC BLOOD PRESSURE: 114 MMHG | BODY MASS INDEX: 26.95 KG/M2 | HEART RATE: 69 BPM

## 2024-06-05 DIAGNOSIS — E55.9 VITAMIN D DEFICIENCY: ICD-10-CM

## 2024-06-05 DIAGNOSIS — I48.91 ATRIAL FIBRILLATION WITH RAPID VENTRICULAR RESPONSE: ICD-10-CM

## 2024-06-05 DIAGNOSIS — N40.0 BENIGN NODULAR PROSTATIC HYPERPLASIA WITHOUT LOWER URINARY TRACT SYMPTOMS: ICD-10-CM

## 2024-06-05 DIAGNOSIS — E78.2 MIXED HYPERLIPIDEMIA: ICD-10-CM

## 2024-06-05 DIAGNOSIS — R73.02 GLUCOSE INTOLERANCE (IMPAIRED GLUCOSE TOLERANCE): ICD-10-CM

## 2024-06-05 DIAGNOSIS — K21.9 GASTROESOPHAGEAL REFLUX DISEASE WITHOUT ESOPHAGITIS: ICD-10-CM

## 2024-06-05 DIAGNOSIS — I10 ESSENTIAL HYPERTENSION: Primary | ICD-10-CM

## 2024-06-05 DIAGNOSIS — M15.9 PRIMARY OSTEOARTHRITIS INVOLVING MULTIPLE JOINTS: ICD-10-CM

## 2024-06-05 DIAGNOSIS — N52.8 OTHER MALE ERECTILE DYSFUNCTION: ICD-10-CM

## 2024-06-05 DIAGNOSIS — K22.10 ULCER OF ESOPHAGUS WITHOUT BLEEDING: ICD-10-CM

## 2024-06-06 RX ORDER — TADALAFIL 5 MG/1
5 TABLET ORAL DAILY PRN
Qty: 90 TABLET | Refills: 1 | Status: SHIPPED | OUTPATIENT
Start: 2024-06-06

## 2024-06-06 NOTE — PROGRESS NOTES
Subjective   Alfred Vazquez is a 68 y.o. male with   Chief Complaint   Patient presents with    Hypertension   .    Hypertension       68-year-old white male with known history of hypertension, hyperlipidemia as well as atrial fibrillation here for further medical management.  Other issues include glucose intolerance, vitamin D deficiency as well as GERD.  There is history of erectile dysfunction and BPH without lower tract symptoms.  He has osteoarthritis at multiple levels including in his fingers and hands.  He loves to play golf and has been troubled with the fact that he has not been discontinued from the use of meloxicam secondary to gastric irritation.  He has recently undergone EGD identifying same.  Fasting labs have been acquired prior to this visit.  Medications are multiple and are as listed.  All medications are used appropriately and are well-tolerated without side effects.  The following portions of the patient's history were reviewed and updated as appropriate: allergies, current medications, past family history, past medical history, past social history, past surgical history and problem list.    Review of Systems   Cardiovascular:         Hypertension, hyperlipidemia, atrial fibrillation   Gastrointestinal:         GERD, gastritis   Endocrine:        Glucose intolerance, vitamin D deficiency   Genitourinary:         BPH, ED       Objective     Vitals:    06/05/24 0849   BP: 114/76   Pulse: 69   Temp: 97.3 °F (36.3 °C)   SpO2: 97%       Recent Results (from the past 672 hour(s))   Tissue Pathology Exam    Collection Time: 05/20/24  9:03 AM    Specimen: 1: Small Intestine; Tissue    2: Stomach; Tissue    3: Gastric, Antrum; Tissue    4: GE Junction; Tissue   Result Value Ref Range    Case Report       Surgical Pathology Report                         Case: [id number]                                  Authorizing Provider:  Placido Williamson MD   Collected:           05/20/2024 09:03 AM           Ordering Location:     [facility]  Received:            05/20/2024 03:11 PM                                 EPIC CARE LINK                                                               Pathologist:           Maddie Fairchild MD                                                    Specimens:   1) - Small Intestine, Duodenum                                                                      2) - Stomach, pre pyloric ulcer                                                                     3) - Gastric, Antrum, antrum                                                                        4) - GE Junction, GE junction                                                              Final Diagnosis       1.  Duodenum, biopsy:   -Benign duodenal mucosa without diagnostic abnormality.    2.  Stomach, prepyloric biopsy:   -Gastropathy with reactive foveolar hyperplasia.   -Negative for Helicobacter on routine staining.   -No metaplasia nor dysplasia identified.    3.  Stomach, antrum, biopsy:   -Reactive gastropathy.   -Negative for Helicobacter on routine staining.   -No metaplasia nor dysplasia identified.    4.  Gastroesophageal junction, biopsy:   -Benign squamous and glandular mucosa with reflux esophagitis.   -Negative for goblet cell metaplasia on routine staining.   -Reactive glandular changes, negative for dysplasia.    [facility]      Gross Description       1. Small Intestine.  The specimen is received in formalin labeled with the patient's name and further designated 'duodenum ' are multiple small fragments of gray-tan tissue. The specimen is submitted for embedding as received.      2. Stomach.  The specimen is received in formalin labeled with the patient's name and further designated 'pre pyloric ulcer ' is a small fragment of gray-tan tissue. The specimen is submitted for embedding as received.      3. Gastric, Antrum.  The specimen is received in formalin labeled with the patient's name and  further designated 'antrum ' are multiple small fragments of gray-tan tissue. The specimen is submitted for embedding as received.      4. GE Junction.  The specimen is received in formalin labeled with the patient's name and further designated 'GE junction  ' are multiple small fragments of gray-tan tissue. The specimen is submitted for embedding as received.         Lipid panel    Collection Time: 05/29/24  8:46 AM    Specimen: Blood   Result Value Ref Range    Total Cholesterol 202 (H) 0 - 200 mg/dL    Triglycerides 95 0 - 150 mg/dL    HDL Cholesterol 62 (H) 40 - 60 mg/dL    VLDL Cholesterol Ronni 17 5 - 40 mg/dL    LDL Chol Calc (NIH) 123 (H) 0 - 100 mg/dL   Comprehensive metabolic panel    Collection Time: 05/29/24  8:46 AM    Specimen: Blood   Result Value Ref Range    Glucose 94 65 - 99 mg/dL    BUN 23 8 - 23 mg/dL    Creatinine 0.92 0.76 - 1.27 mg/dL    EGFR Result 89.5 >60.0 mL/min/1.73    BUN/Creatinine Ratio 25.0 7.0 - 25.0    Sodium 144 136 - 145 mmol/L    Potassium 4.5 3.5 - 5.2 mmol/L    Chloride 105 98 - 107 mmol/L    Total CO2 28.8 22.0 - 29.0 mmol/L    Calcium 9.1 8.6 - 10.5 mg/dL    Total Protein 5.9 (L) 6.0 - 8.5 g/dL    Albumin 4.0 3.5 - 5.2 g/dL    Globulin 1.9 gm/dL    A/G Ratio 2.1 g/dL    Total Bilirubin 0.6 0.0 - 1.2 mg/dL    Alkaline Phosphatase 60 39 - 117 U/L    AST (SGOT) 19 1 - 40 U/L    ALT (SGPT) 19 1 - 41 U/L   PSA DIAGNOSTIC ONLY    Collection Time: 05/29/24  8:46 AM    Specimen: Blood   Result Value Ref Range    PSA 0.437 0.000 - 4.000 ng/mL   TSH    Collection Time: 05/29/24  8:46 AM    Specimen: Blood   Result Value Ref Range    TSH 1.270 0.270 - 4.200 uIU/mL   Hemoglobin A1c    Collection Time: 05/29/24  8:46 AM    Specimen: Blood   Result Value Ref Range    Hemoglobin A1C 5.80 (H) 4.80 - 5.60 %   Vitamin D 25 hydroxy    Collection Time: 05/29/24  8:46 AM    Specimen: Blood   Result Value Ref Range    25 Hydroxy, Vitamin D 85.2 30.0 - 100.0 ng/ml   CBC w AUTO Differential     Collection Time: 05/29/24  8:46 AM    Specimen: Blood   Result Value Ref Range    WBC 4.30 3.40 - 10.80 10*3/mm3    RBC 4.43 4.14 - 5.80 10*6/mm3    Hemoglobin 14.3 13.0 - 17.7 g/dL    Hematocrit 43.7 37.5 - 51.0 %    MCV 98.6 (H) 79.0 - 97.0 fL    MCH 32.3 26.6 - 33.0 pg    MCHC 32.7 31.5 - 35.7 g/dL    RDW 12.0 (L) 12.3 - 15.4 %    Platelets 183 140 - 450 10*3/mm3    Neutrophil Rel % 50.9 42.7 - 76.0 %    Lymphocyte Rel % 27.7 19.6 - 45.3 %    Monocyte Rel % 14.0 (H) 5.0 - 12.0 %    Eosinophil Rel % 6.3 (H) 0.3 - 6.2 %    Basophil Rel % 0.9 0.0 - 1.5 %    Neutrophils Absolute 2.19 1.70 - 7.00 10*3/mm3    Lymphocytes Absolute 1.19 0.70 - 3.10 10*3/mm3    Monocytes Absolute 0.60 0.10 - 0.90 10*3/mm3    Eosinophils Absolute 0.27 0.00 - 0.40 10*3/mm3    Basophils Absolute 0.04 0.00 - 0.20 10*3/mm3    Immature Granulocyte Rel % 0.2 0.0 - 0.5 %    Immature Grans Absolute 0.01 0.00 - 0.05 10*3/mm3    nRBC 0.0 0.0 - 0.2 /100 WBC       Physical Exam  Vitals and nursing note reviewed.   Constitutional:       Appearance: Normal appearance. He is well-developed and well-groomed.   HENT:      Head: Normocephalic and atraumatic.   Neck:      Thyroid: No thyroid mass or thyromegaly.      Vascular: Normal carotid pulses. No carotid bruit.      Trachea: Trachea and phonation normal.   Cardiovascular:      Rate and Rhythm: Normal rate and regular rhythm.      Heart sounds: Normal heart sounds. No murmur heard.     No friction rub. No gallop.   Pulmonary:      Effort: Pulmonary effort is normal. No respiratory distress.      Breath sounds: Normal breath sounds. No decreased breath sounds, wheezing, rhonchi or rales.   Musculoskeletal:      Cervical back: Neck supple.   Lymphadenopathy:      Cervical: No cervical adenopathy.   Skin:     General: Skin is warm and dry.      Findings: No rash.   Neurological:      Mental Status: He is alert and oriented to person, place, and time.   Psychiatric:         Attention and Perception:  Attention and perception normal.         Mood and Affect: Mood and affect normal.         Speech: Speech normal.         Behavior: Behavior normal. Behavior is cooperative.         Thought Content: Thought content normal.         Cognition and Memory: Cognition and memory normal.         Judgment: Judgment normal.         Assessment & Plan   Diagnoses and all orders for this visit:    1. Essential hypertension (Primary)  -     Comprehensive metabolic panel; Future  -     Vitamin D 25 hydroxy; Future  -     TSH; Future  -     Hemoglobin A1c; Future  -     Lipid panel; Future  -     CBC w AUTO Differential; Future    2. Mixed hyperlipidemia  -     Comprehensive metabolic panel; Future  -     Vitamin D 25 hydroxy; Future  -     TSH; Future  -     Hemoglobin A1c; Future  -     Lipid panel; Future  -     CBC w AUTO Differential; Future    3. Atrial fibrillation with rapid ventricular response  -     Comprehensive metabolic panel; Future  -     Vitamin D 25 hydroxy; Future  -     TSH; Future  -     Hemoglobin A1c; Future  -     Lipid panel; Future  -     CBC w AUTO Differential; Future    4. Glucose intolerance (impaired glucose tolerance)  -     Comprehensive metabolic panel; Future  -     Vitamin D 25 hydroxy; Future  -     TSH; Future  -     Hemoglobin A1c; Future  -     Lipid panel; Future  -     CBC w AUTO Differential; Future    5. Vitamin D deficiency  -     Comprehensive metabolic panel; Future  -     Vitamin D 25 hydroxy; Future  -     TSH; Future  -     Hemoglobin A1c; Future  -     Lipid panel; Future  -     CBC w AUTO Differential; Future    6. Gastroesophageal reflux disease without esophagitis  -     Comprehensive metabolic panel; Future  -     Vitamin D 25 hydroxy; Future  -     TSH; Future  -     Hemoglobin A1c; Future  -     Lipid panel; Future  -     CBC w AUTO Differential; Future    7. Ulcer of esophagus without bleeding  -     Comprehensive metabolic panel; Future  -     Vitamin D 25 hydroxy;  Future  -     TSH; Future  -     Hemoglobin A1c; Future  -     Lipid panel; Future  -     CBC w AUTO Differential; Future    8. Benign nodular prostatic hyperplasia without lower urinary tract symptoms  -     Comprehensive metabolic panel; Future  -     Vitamin D 25 hydroxy; Future  -     TSH; Future  -     Hemoglobin A1c; Future  -     Lipid panel; Future  -     CBC w AUTO Differential; Future    9. Other male erectile dysfunction  -     Comprehensive metabolic panel; Future  -     Vitamin D 25 hydroxy; Future  -     TSH; Future  -     Hemoglobin A1c; Future  -     Lipid panel; Future  -     CBC w AUTO Differential; Future  -     tadalafil (Cialis) 5 MG tablet; Take 1 tablet by mouth Daily As Needed for Erectile Dysfunction.  Dispense: 90 tablet; Refill: 1    10. Primary osteoarthritis involving multiple joints  -     Comprehensive metabolic panel; Future  -     Vitamin D 25 hydroxy; Future  -     TSH; Future  -     Hemoglobin A1c; Future  -     Lipid panel; Future  -     CBC w AUTO Differential; Future        Return in about 6 months (around 12/5/2024) for Recheck.

## 2024-07-15 ENCOUNTER — TELEPHONE (OUTPATIENT)
Dept: GASTROENTEROLOGY | Facility: CLINIC | Age: 69
End: 2024-07-15
Payer: MEDICARE

## 2024-07-15 NOTE — TELEPHONE ENCOUNTER
Provider: GARDENIA CHAMORRO    Caller: AB UGARTE    Relationship to Patient: SELF    Phone Number: [phone]    Reason for Call: PATIENT CALLED IN AND STATED THAT HE WILL NEED TO RESCHEDULE HIS UPCOMING EGD SCHEDULED FOR 09/06/2024 DUE TO NOT HAVING TRANSPORTATION. PLEASE CALL BACK TO RESCHEDULE.

## 2024-07-15 NOTE — TELEPHONE ENCOUNTER
"Caller: Alfred Vazquez    Relationship: Self    Best call back number: [phone]    Requested Prescriptions:   Requested Prescriptions     Pending Prescriptions Disp Refills    pantoprazole (PROTONIX) 40 MG EC tablet 90 tablet 3     Sig: Take 1 tablet by mouth Daily.        Pharmacy where request should be sent:  [facility] - [address] [phone] [facility] [phone] FX [[id number]]     Last office visit with prescribing clinician: 2/22/2024   Last telemedicine visit with prescribing clinician: Visit date not found   Next office visit with prescribing clinician: Visit date not found     Additional details provided by patient: PATIENT STATED THAT HE WAS TOLD BY [facility] THAT THEY HAVE TRIED TO REACH OFFICE AND HAS HAD NO RESPONSE. PATIENT STATES THAT [facility] NEED A \"NEW PRESCRIPTION FOR PANTOPRAZOLE MEDICATION.    Does the patient have less than a 3 day supply:  [] Yes  [x] No    Would you like a call back once the refill request has been completed: [x] Yes [] No    If the office needs to give you a call back, can they leave a voicemail: [] Yes [] No    Keli Puckett Rep   07/15/24 08:49 EDT           "

## 2024-07-15 NOTE — TELEPHONE ENCOUNTER
DENISE FLETCHER CANCELLED 9-6-24 AT [facility], SW PT HE REQUESTED EGD AT [facility], INFORMED HIM THAT DUE TO HAVING AFIB, [facility] COULD CANCEL, HE WANTED TO PROCEED, SCHEDULED EGD FOR 9-30-24 AT [facility], MAILED PREP INSTRUCTIONS TO VERIFIED ADDRESS ON FILE

## 2024-07-17 RX ORDER — PANTOPRAZOLE SODIUM 40 MG/1
40 TABLET, DELAYED RELEASE ORAL DAILY
Qty: 90 TABLET | Refills: 3 | Status: SHIPPED | OUTPATIENT
Start: 2024-07-17

## 2024-09-02 DIAGNOSIS — M15.9 PRIMARY OSTEOARTHRITIS INVOLVING MULTIPLE JOINTS: ICD-10-CM

## 2024-09-03 RX ORDER — MELOXICAM 15 MG/1
15 TABLET ORAL EVERY OTHER DAY
Qty: 45 TABLET | Refills: 3 | OUTPATIENT
Start: 2024-09-03

## 2024-09-27 ENCOUNTER — TELEPHONE (OUTPATIENT)
Dept: GASTROENTEROLOGY | Facility: CLINIC | Age: 69
End: 2024-09-27
Payer: MEDICARE

## 2024-09-30 ENCOUNTER — OUTSIDE FACILITY SERVICE (OUTPATIENT)
Dept: GASTROENTEROLOGY | Facility: CLINIC | Age: 69
End: 2024-09-30
Payer: MEDICARE

## 2024-09-30 PROCEDURE — 43235 EGD DIAGNOSTIC BRUSH WASH: CPT | Performed by: INTERNAL MEDICINE

## 2024-11-20 DIAGNOSIS — N52.8 OTHER MALE ERECTILE DYSFUNCTION: ICD-10-CM

## 2024-11-20 DIAGNOSIS — E55.9 VITAMIN D DEFICIENCY: ICD-10-CM

## 2024-11-20 DIAGNOSIS — K21.9 GASTROESOPHAGEAL REFLUX DISEASE WITHOUT ESOPHAGITIS: ICD-10-CM

## 2024-11-20 DIAGNOSIS — K22.10 ULCER OF ESOPHAGUS WITHOUT BLEEDING: ICD-10-CM

## 2024-11-20 DIAGNOSIS — M15.0 PRIMARY OSTEOARTHRITIS INVOLVING MULTIPLE JOINTS: ICD-10-CM

## 2024-11-20 DIAGNOSIS — N40.0 BENIGN NODULAR PROSTATIC HYPERPLASIA WITHOUT LOWER URINARY TRACT SYMPTOMS: ICD-10-CM

## 2024-11-20 DIAGNOSIS — E78.2 MIXED HYPERLIPIDEMIA: ICD-10-CM

## 2024-11-20 DIAGNOSIS — I48.91 ATRIAL FIBRILLATION WITH RAPID VENTRICULAR RESPONSE: ICD-10-CM

## 2024-11-20 DIAGNOSIS — R73.02 GLUCOSE INTOLERANCE (IMPAIRED GLUCOSE TOLERANCE): ICD-10-CM

## 2024-11-20 DIAGNOSIS — I10 ESSENTIAL HYPERTENSION: ICD-10-CM

## 2024-11-26 LAB
25(OH)D3+25(OH)D2 SERPL-MCNC: 86.6 NG/ML (ref 30–100)
ALBUMIN SERPL-MCNC: 4.2 G/DL (ref 3.8–4.8)
ALP SERPL-CCNC: 69 IU/L (ref 44–121)
ALT SERPL-CCNC: 18 IU/L (ref 0–44)
AST SERPL-CCNC: 22 IU/L (ref 0–40)
BASOPHILS # BLD AUTO: 0 X10E3/UL (ref 0–0.2)
BASOPHILS NFR BLD AUTO: 1 %
BILIRUB SERPL-MCNC: 0.8 MG/DL (ref 0–1.2)
BUN SERPL-MCNC: 28 MG/DL (ref 8–27)
BUN/CREAT SERPL: 29 (ref 10–24)
CALCIUM SERPL-MCNC: 9.3 MG/DL (ref 8.6–10.2)
CHLORIDE SERPL-SCNC: 103 MMOL/L (ref 96–106)
CHOLEST SERPL-MCNC: 205 MG/DL (ref 100–199)
CO2 SERPL-SCNC: 24 MMOL/L (ref 20–29)
CREAT SERPL-MCNC: 0.98 MG/DL (ref 0.76–1.27)
EGFRCR SERPLBLD CKD-EPI 2021: 82 ML/MIN/1.73
EOSINOPHIL # BLD AUTO: 0 X10E3/UL (ref 0–0.4)
EOSINOPHIL NFR BLD AUTO: 1 %
ERYTHROCYTE [DISTWIDTH] IN BLOOD BY AUTOMATED COUNT: 12.3 % (ref 11.6–15.4)
GLOBULIN SER CALC-MCNC: 2.1 G/DL (ref 1.5–4.5)
GLUCOSE SERPL-MCNC: 106 MG/DL (ref 70–99)
HBA1C MFR BLD: 6.1 % (ref 4.8–5.6)
HCT VFR BLD AUTO: 46.4 % (ref 37.5–51)
HDLC SERPL-MCNC: 57 MG/DL
HGB BLD-MCNC: 15 G/DL (ref 13–17.7)
IMM GRANULOCYTES # BLD AUTO: 0 X10E3/UL (ref 0–0.1)
IMM GRANULOCYTES NFR BLD AUTO: 1 %
LDLC SERPL CALC-MCNC: 131 MG/DL (ref 0–99)
LYMPHOCYTES # BLD AUTO: 1.2 X10E3/UL (ref 0.7–3.1)
LYMPHOCYTES NFR BLD AUTO: 33 %
MCH RBC QN AUTO: 31.7 PG (ref 26.6–33)
MCHC RBC AUTO-ENTMCNC: 32.3 G/DL (ref 31.5–35.7)
MCV RBC AUTO: 98 FL (ref 79–97)
MONOCYTES # BLD AUTO: 0.5 X10E3/UL (ref 0.1–0.9)
MONOCYTES NFR BLD AUTO: 13 %
NEUTROPHILS # BLD AUTO: 1.9 X10E3/UL (ref 1.4–7)
NEUTROPHILS NFR BLD AUTO: 51 %
PLATELET # BLD AUTO: 209 X10E3/UL (ref 150–450)
POTASSIUM SERPL-SCNC: 4.4 MMOL/L (ref 3.5–5.2)
PROT SERPL-MCNC: 6.3 G/DL (ref 6–8.5)
RBC # BLD AUTO: 4.73 X10E6/UL (ref 4.14–5.8)
SODIUM SERPL-SCNC: 141 MMOL/L (ref 134–144)
TRIGL SERPL-MCNC: 97 MG/DL (ref 0–149)
TSH SERPL DL<=0.005 MIU/L-ACNC: 1.15 UIU/ML (ref 0.45–4.5)
VLDLC SERPL CALC-MCNC: 17 MG/DL (ref 5–40)
WBC # BLD AUTO: 3.6 X10E3/UL (ref 3.4–10.8)

## 2024-12-02 ENCOUNTER — OFFICE VISIT (OUTPATIENT)
Dept: FAMILY MEDICINE CLINIC | Facility: CLINIC | Age: 69
End: 2024-12-02
Payer: MEDICARE

## 2024-12-02 VITALS
BODY MASS INDEX: 27.98 KG/M2 | DIASTOLIC BLOOD PRESSURE: 84 MMHG | OXYGEN SATURATION: 97 % | WEIGHT: 206.6 LBS | HEART RATE: 69 BPM | TEMPERATURE: 96.9 F | SYSTOLIC BLOOD PRESSURE: 128 MMHG | HEIGHT: 72 IN

## 2024-12-02 DIAGNOSIS — N52.8 OTHER MALE ERECTILE DYSFUNCTION: ICD-10-CM

## 2024-12-02 DIAGNOSIS — Z00.00 MEDICARE ANNUAL WELLNESS VISIT, SUBSEQUENT: Primary | ICD-10-CM

## 2024-12-02 DIAGNOSIS — I10 ESSENTIAL HYPERTENSION: ICD-10-CM

## 2024-12-02 DIAGNOSIS — K21.9 GASTROESOPHAGEAL REFLUX DISEASE WITHOUT ESOPHAGITIS: ICD-10-CM

## 2024-12-02 DIAGNOSIS — E78.2 MIXED HYPERLIPIDEMIA: ICD-10-CM

## 2024-12-02 DIAGNOSIS — I48.91 ATRIAL FIBRILLATION WITH RAPID VENTRICULAR RESPONSE: ICD-10-CM

## 2024-12-02 DIAGNOSIS — N40.0 BENIGN NODULAR PROSTATIC HYPERPLASIA WITHOUT LOWER URINARY TRACT SYMPTOMS: ICD-10-CM

## 2024-12-02 DIAGNOSIS — M15.0 PRIMARY OSTEOARTHRITIS INVOLVING MULTIPLE JOINTS: ICD-10-CM

## 2024-12-02 DIAGNOSIS — M51.360 DEGENERATION OF INTERVERTEBRAL DISC OF LUMBAR REGION WITH DISCOGENIC BACK PAIN: ICD-10-CM

## 2024-12-02 DIAGNOSIS — R73.02 GLUCOSE INTOLERANCE (IMPAIRED GLUCOSE TOLERANCE): ICD-10-CM

## 2024-12-02 DIAGNOSIS — E55.9 VITAMIN D DEFICIENCY: ICD-10-CM

## 2024-12-02 DIAGNOSIS — K22.10 ULCER OF ESOPHAGUS WITHOUT BLEEDING: ICD-10-CM

## 2024-12-02 PROCEDURE — 3079F DIAST BP 80-89 MM HG: CPT | Performed by: FAMILY MEDICINE

## 2024-12-02 PROCEDURE — 3074F SYST BP LT 130 MM HG: CPT | Performed by: FAMILY MEDICINE

## 2024-12-02 PROCEDURE — 1159F MED LIST DOCD IN RCRD: CPT | Performed by: FAMILY MEDICINE

## 2024-12-02 PROCEDURE — 99213 OFFICE O/P EST LOW 20 MIN: CPT | Performed by: FAMILY MEDICINE

## 2024-12-02 PROCEDURE — 1126F AMNT PAIN NOTED NONE PRSNT: CPT | Performed by: FAMILY MEDICINE

## 2024-12-02 PROCEDURE — G0439 PPPS, SUBSEQ VISIT: HCPCS | Performed by: FAMILY MEDICINE

## 2024-12-02 PROCEDURE — 1160F RVW MEDS BY RX/DR IN RCRD: CPT | Performed by: FAMILY MEDICINE

## 2024-12-02 RX ORDER — METOPROLOL SUCCINATE 25 MG/1
TABLET, EXTENDED RELEASE ORAL
Qty: 45 TABLET | Refills: 1 | Status: SHIPPED | OUTPATIENT
Start: 2024-12-02

## 2024-12-02 NOTE — ASSESSMENT & PLAN NOTE
Lipid abnormalities are worsening    Plan:  Lipid lowering therapy not prescribed due to patient refusal    Discussed medication dosage, use, side effects, and goals of treatment in detail.    Counseled patient on lifestyle modifications to help control hyperlipidemia.     Patient Treatment Goals:   LDL goal is less than 70  LDL goal is under 100    Followup in 6 months.    Orders:    Comprehensive metabolic panel; Future    PSA DIAGNOSTIC ONLY; Future    Vitamin D 25 hydroxy; Future    TSH; Future    Lipid panel; Future    Hemoglobin A1c; Future    CBC w AUTO Differential; Future

## 2024-12-02 NOTE — ASSESSMENT & PLAN NOTE
Orders:    Comprehensive metabolic panel; Future    PSA DIAGNOSTIC ONLY; Future    Vitamin D 25 hydroxy; Future    TSH; Future    Lipid panel; Future    Hemoglobin A1c; Future    CBC w AUTO Differential; Future

## 2024-12-02 NOTE — ASSESSMENT & PLAN NOTE
Hypertension is stable and controlled  Continue current treatment regimen.  Blood pressure will be reassessed in 6 months.    Orders:    metoprolol succinate XL (Toprol XL) 25 MG 24 hr tablet; 1/2 tab p.o. daily    Comprehensive metabolic panel; Future    PSA DIAGNOSTIC ONLY; Future    Vitamin D 25 hydroxy; Future    TSH; Future    Lipid panel; Future    Hemoglobin A1c; Future    CBC w AUTO Differential; Future     Pharmacy Medication Reconciliation Note     List of medications patient is currently taking is complete.    Source of information:   1. Conversation with pt at bedside   2. Fill hx and EMR      Notes regarding home medications:   1.  Added Percocet ( sees Pain Management ), Acyclovir, amlodipine and Breztri  2. No longer on Valsartan. Adderall regimen is 30 mg in AM and 20 mg in PM    Debora Chavarria RPH   1/9/2024  9:16 AM

## 2024-12-02 NOTE — PROGRESS NOTES
Subjective   The ABCs of the Annual Wellness Visit  Medicare Wellness Visit      Alfred Vazquez is a 69 y.o. patient who presents for a Medicare Wellness Visit.    The following portions of the patient's history were reviewed and   updated as appropriate: allergies, current medications, past family history, past medical history, past social history, past surgical history, and problem list.    Compared to one year ago, the patient's physical   health is the same.  Compared to one year ago, the patient's mental   health is the same.    Recent Hospitalizations:  He was not admitted to the hospital during the last year.     Current Medical Providers:  Patient Care Team:  Julio Watson DO as PCP - General    Outpatient Medications Prior to Visit   Medication Sig Dispense Refill    aspirin 81 MG chewable tablet Chew 1 tablet Daily.      CALCIUM CARBONATE-VITAMIN D PO Take  by mouth.      Cholecalciferol (VITAMIN D3) 125 MCG (5000 UT) capsule capsule Take 1 capsule by mouth Daily.      erythromycin (ROMYCIN) 5 MG/GM ophthalmic ointment Administer  to the right eye 1 (One) Time.      famotidine (PEPCID) 10 MG tablet Take 2 tablets by mouth Daily.      flecainide (TAMBOCOR) 100 MG tablet Take 1 tablet by mouth 2 (Two) Times a Day.      multivitamin (MULTI VITAMIN PO) Take  by mouth Daily.      pantoprazole (PROTONIX) 40 MG EC tablet Take 1 tablet by mouth Daily. 90 tablet 3    PREBIOTIC PRODUCT PO Take  by mouth.      metoprolol succinate XL (TOPROL-XL) 25 MG 24 hr tablet TAKE 1 TABLET DAILY (Patient taking differently: Take 0.5 tablets by mouth Daily. Takes 1/2 tablet) 90 tablet 3    neomycin-polymyxin-dexamethamethasone (POLYDEX) 3.5-38968-0.1 ointment ophthalmic ointment Administer  to the right eye Every 8 (Eight) Hours.      sulfamethoxazole-trimethoprim (BACTRIM DS,SEPTRA DS) 800-160 MG per tablet Take 1 tablet by mouth Every 12 (Twelve) Hours.      tadalafil (Cialis) 5 MG tablet Take 1 tablet by mouth Daily As  "Needed for Erectile Dysfunction. 90 tablet 1     No facility-administered medications prior to visit.     No opioid medication identified on active medication list. I have reviewed chart for other potential  high risk medication/s and harmful drug interactions in the elderly.      Aspirin is on active medication list. Aspirin use is indicated based on review of current medical condition/s. Pros and cons of this therapy have been discussed today. Benefits of this medication outweigh potential harm.  Patient has been encouraged to continue taking this medication.  .      Patient Active Problem List   Diagnosis    Atrial fibrillation with rapid ventricular response    Degeneration of intervertebral disc of lumbar region    Gastroesophageal reflux disease without esophagitis    Mixed hyperlipidemia    Insomnia    Primary osteoarthritis involving multiple joints    Seasonal allergic rhinitis    Other male erectile dysfunction    Glucose intolerance (impaired glucose tolerance)    Onychomycosis of right great toe    Benign nodular prostatic hyperplasia without lower urinary tract symptoms    Abdominal aortic aneurysm    Vitamin D deficiency    Essential hypertension    Epigastric pain    Esophagitis    Ulcer of esophagus without bleeding     Advance Care Planning Advance Directive is not on file.  ACP discussion was held with the patient during this visit. Patient does not have an advance directive, information provided.            Objective   Vitals:    12/02/24 0839   BP: 128/84   BP Location: Left arm   Patient Position: Sitting   Cuff Size: Large Adult   Pulse: 69   Temp: 96.9 °F (36.1 °C)   TempSrc: Infrared   SpO2: 97%   Weight: 93.7 kg (206 lb 9.6 oz)   Height: 182.9 cm (72.01\")   PainSc: 0-No pain       Estimated body mass index is 28.01 kg/m² as calculated from the following:    Height as of this encounter: 182.9 cm (72.01\").    Weight as of this encounter: 93.7 kg (206 lb 9.6 oz).            Does the patient " have evidence of cognitive impairment? No  Lab Results   Component Value Date    CHLPL 205 (H) 2024    TRIG 97 2024    HDL 57 2024     (H) 2024    VLDL 17 2024    HGBA1C 6.1 (H) 2024                                                                                                Health  Risk Assessment    Smoking Status:  Social History     Tobacco Use   Smoking Status Former    Current packs/day: 0.00    Average packs/day: 0.3 packs/day for 2.0 years (0.5 ttl pk-yrs)    Types: Cigarettes    Start date: 1968    Quit date: 1970    Years since quittin.9    Passive exposure: Past   Smokeless Tobacco Never   Tobacco Comments    Smoked as a teenager     Alcohol Consumption:  Social History     Substance and Sexual Activity   Alcohol Use Not Currently       Fall Risk Screen  STEADI Fall Risk Assessment was completed, and patient is at LOW risk for falls.Assessment completed on:2024    Depression Screening   Little interest or pleasure in doing things? Not at all   Feeling down, depressed, or hopeless? Not at all   PHQ-2 Total Score 0      Health Habits and Functional and Cognitive Screenin/30/2024     8:22 PM   Functional & Cognitive Status   Do you have difficulty preparing food and eating? No    Do you have difficulty bathing yourself, getting dressed or grooming yourself? No    Do you have difficulty using the toilet? No    Do you have difficulty moving around from place to place? No    Do you have trouble with steps or getting out of a bed or a chair? No    Current Diet Well Balanced Diet    Dental Exam Up to date    Eye Exam Up to date    Exercise (times per week) 3 times per week    Current Exercises Include Light Weights;Walking    Do you need help using the phone?  No    Are you deaf or do you have serious difficulty hearing?  No    Do you need help to go to places out of walking distance? No    Do you need help shopping? No    Do you need help  preparing meals?  No    Do you need help with housework?  No    Do you need help with laundry? No    Do you need help taking your medications? No    Do you need help managing money? No    Do you ever drive or ride in a car without wearing a seat belt? No    Have you felt unusual stress, anger or loneliness in the last month? No    Who do you live with? Spouse    If you need help, do you have trouble finding someone available to you? No    Have you been bothered in the last four weeks by sexual problems? Yes    Do you have difficulty concentrating, remembering or making decisions? No        Patient-reported           Age-appropriate Screening Schedule:  Refer to the list below for future screening recommendations based on patient's age, sex and/or medical conditions. Orders for these recommended tests are listed in the plan section. The patient has been provided with a written plan.    Health Maintenance List  Health Maintenance   Topic Date Due    ZOSTER VACCINE (2 of 3) 12/02/2024 (Originally 1/7/2014)    HEPATITIS C SCREENING  12/31/2024 (Originally 2/22/2016)    DIABETIC EYE EXAM  12/31/2024 (Originally 7/27/2024)    TDAP/TD VACCINES (2 - Td or Tdap) 12/31/2024 (Originally 11/13/2022)    Pneumococcal Vaccine 65+ (3 of 3 - PPSV23 or PCV20) 12/02/2025 (Originally 11/2/2022)    BMI FOLLOWUP  01/29/2025    HEMOGLOBIN A1C  05/25/2025    LIPID PANEL  11/25/2025    ANNUAL WELLNESS VISIT  12/02/2025    COLORECTAL CANCER SCREENING  05/20/2029    COVID-19 Vaccine  Completed    INFLUENZA VACCINE  Completed    AAA SCREEN (ONE-TIME)  Completed    URINE MICROALBUMIN  Discontinued                                                                                                                                                CMS Preventative Services Quick Reference  Risk Factors Identified During Encounter  Chronic Pain: Natural history and expected course discussed. Questions answered.  Immunizations Discussed/Encouraged: Tdap,  Influenza, and Shingrix  Polypharmacy: Medication List reviewed  Dental Screening Recommended  Vision Screening Recommended    The above risks/problems have been discussed with the patient.  Pertinent information has been shared with the patient in the After Visit Summary.  An After Visit Summary and PPPS were made available to the patient.    Follow Up:   Next Medicare Wellness visit to be scheduled in 1 year.         Additional E&M Note during same encounter follows:  Patient has additional, significant, and separately identifiable condition(s)/problem(s) that require work above and beyond the Medicare Wellness Visit     Chief Complaint  Medicare Wellness-subsequent (Labs prior)    Subjective   HPI 69-year-old white male here with multiple medical issues here for follow up and further medical management.  He is also here for [facility]-Medicare.  Past medical history includes hypertension, hyperlipidemia as well as atrial fibrillation.  He has been trying to control his lipid status with dietary measures alone.  He admits that this time around he has not done well.  He also has history of GERD and underwent endoscopy in May of this year with esophageal ulcer identified.  He has been off his meloxicam since that time and has been using pantoprazole faithfully.  He states that he will be returning to see Dr. Williamson with the question as to how long he needs to use the pantoprazole and when he can get back on his meloxicam since it really benefited his arthritis.  He does have history of BPH without lower tract symptoms and there is history of erectile dysfunction.  Medications include 81 mg aspirin, famotidine as well as Tambocor.  He is also using Toprol-XL, pantoprazole as well as over-the-counter supplements and vitamins including vitamin D3.  Fasting labs have been acquired prior to this visit.  Alfred is also being seen today for an annual adult preventative physical exam.  and Alfred is also being seen today for  "additional medical problem/s.    Review of Systems   Cardiovascular:         Hypertension, hyperlipidemia, atrial fibrillation   Gastrointestinal:         GERD, esophageal ulcer   Endocrine:        Overweight, vitamin D deficiency, glucose intolerance   Genitourinary:         BPH, erectile dysfunction   Musculoskeletal:  Positive for arthralgias.   Psychiatric/Behavioral:  Positive for sleep disturbance.               Objective   Vital Signs:  /84 (BP Location: Left arm, Patient Position: Sitting, Cuff Size: Large Adult)   Pulse 69   Temp 96.9 °F (36.1 °C) (Infrared)   Ht 182.9 cm (72.01\")   Wt 93.7 kg (206 lb 9.6 oz)   SpO2 97%   BMI 28.01 kg/m²   Physical Exam  Vitals and nursing note reviewed.   Constitutional:       Appearance: Normal appearance. He is well-developed, well-groomed and overweight.   HENT:      Head: Normocephalic and atraumatic.   Neck:      Thyroid: No thyroid mass or thyromegaly.      Vascular: Normal carotid pulses. No carotid bruit.      Trachea: Trachea and phonation normal.   Cardiovascular:      Rate and Rhythm: Normal rate and regular rhythm.      Heart sounds: Normal heart sounds. No murmur heard.     No friction rub. No gallop.   Pulmonary:      Effort: Pulmonary effort is normal. No respiratory distress.      Breath sounds: Normal breath sounds. No decreased breath sounds, wheezing, rhonchi or rales.   Musculoskeletal:      Cervical back: Neck supple.   Lymphadenopathy:      Cervical: No cervical adenopathy.   Skin:     General: Skin is warm and dry.      Findings: No rash.   Neurological:      Mental Status: He is alert and oriented to person, place, and time.   Psychiatric:         Attention and Perception: Attention and perception normal.         Mood and Affect: Mood and affect normal.         Speech: Speech normal.         Behavior: Behavior normal. Behavior is cooperative.         Thought Content: Thought content normal.         Cognition and Memory: Cognition and " memory normal.         Judgment: Judgment normal.       Orders Only on 11/20/2024   Component Date Value Ref Range Status    Glucose 11/25/2024 106 (H)  70 - 99 mg/dL Final    BUN 11/25/2024 28 (H)  8 - 27 mg/dL Final    Creatinine 11/25/2024 0.98  0.76 - 1.27 mg/dL Final    EGFR Result 11/25/2024 82  >59 mL/min/1.73 Final    BUN/Creatinine Ratio 11/25/2024 29 (H)  10 - 24 Final    Sodium 11/25/2024 141  134 - 144 mmol/L Final    Potassium 11/25/2024 4.4  3.5 - 5.2 mmol/L Final    Chloride 11/25/2024 103  96 - 106 mmol/L Final    Total CO2 11/25/2024 24  20 - 29 mmol/L Final    Calcium 11/25/2024 9.3  8.6 - 10.2 mg/dL Final    Total Protein 11/25/2024 6.3  6.0 - 8.5 g/dL Final    Albumin 11/25/2024 4.2  3.8 - 4.8 g/dL Final    Globulin 11/25/2024 2.1  1.5 - 4.5 g/dL Final    Total Bilirubin 11/25/2024 0.8  0.0 - 1.2 mg/dL Final    Alkaline Phosphatase 11/25/2024 69  44 - 121 IU/L Final    AST (SGOT) 11/25/2024 22  0 - 40 IU/L Final    ALT (SGPT) 11/25/2024 18  0 - 44 IU/L Final    25 Hydroxy, Vitamin D 11/25/2024 86.6  30.0 - 100.0 ng/mL Final    Comment: Vitamin D deficiency has been defined by the [place] of  [employer]icine and an Endocrine Society practice guideline as a  level of serum 25-OH vitamin D less than 20 ng/mL (1,2).  The Endocrine Society went on to further define vitamin D  insufficiency as a level between 21 and 29 ng/mL (2).  1. IOM ([place] of [employer]). 2010. Dietary reference     intakes for calcium and D. [place]: The     National Academies Press.  2. Kwadwo MF, Sherrie NC, Geoffrey VENEGAS, et al.     Evaluation, treatment, and prevention of vitamin D     deficiency: an Endocrine Society clinical practice     guideline. JCEM. 2011 Jul; 96(7):1911-30.      TSH 11/25/2024 1.150  0.450 - 4.500 uIU/mL Final    Hemoglobin A1C 11/25/2024 6.1 (H)  4.8 - 5.6 % Final    Comment:          Prediabetes: 5.7 - 6.4           Diabetes: >6.4           Glycemic control for adults with diabetes: <7.0       Total Cholesterol 11/25/2024 205 (H)  100 - 199 mg/dL Final    Triglycerides 11/25/2024 97  0 - 149 mg/dL Final    HDL Cholesterol 11/25/2024 57  >39 mg/dL Final    VLDL Cholesterol Ronni 11/25/2024 17  5 - 40 mg/dL Final    LDL Chol Calc (NIH) 11/25/2024 131 (H)  0 - 99 mg/dL Final    WBC 11/25/2024 3.6  3.4 - 10.8 x10E3/uL Final    Comment: **Effective December 2, 2024 profile [id number] WBC will be made**    non-orderable as a stand-alone order code.      RBC 11/25/2024 4.73  4.14 - 5.80 x10E6/uL Final    Hemoglobin 11/25/2024 15.0  13.0 - 17.7 g/dL Final    Hematocrit 11/25/2024 46.4  37.5 - 51.0 % Final    MCV 11/25/2024 98 (H)  79 - 97 fL Final    MCH 11/25/2024 31.7  26.6 - 33.0 pg Final    MCHC 11/25/2024 32.3  31.5 - 35.7 g/dL Final    RDW 11/25/2024 12.3  11.6 - 15.4 % Final    Platelets 11/25/2024 209  150 - 450 x10E3/uL Final    Neutrophil Rel % 11/25/2024 51  Not Estab. % Final    Lymphocyte Rel % 11/25/2024 33  Not Estab. % Final    Monocyte Rel % 11/25/2024 13  Not Estab. % Final    Eosinophil Rel % 11/25/2024 1  Not Estab. % Final    Basophil Rel % 11/25/2024 1  Not Estab. % Final    Neutrophils Absolute 11/25/2024 1.9  1.4 - 7.0 x10E3/uL Final    Lymphocytes Absolute 11/25/2024 1.2  0.7 - 3.1 x10E3/uL Final    Monocytes Absolute 11/25/2024 0.5  0.1 - 0.9 x10E3/uL Final    Eosinophils Absolute 11/25/2024 0.0  0.0 - 0.4 x10E3/uL Final    Basophils Absolute 11/25/2024 0.0  0.0 - 0.2 x10E3/uL Final    Immature Granulocyte Rel % 11/25/2024 1  Not Estab. % Final    Immature Grans Absolute 11/25/2024 0.0  0.0 - 0.1 x10E3/uL Final         The following data was reviewed by: Julio Watson DO on 12/02/2024:  Data reviewed : GI studies colonoscopy  Common labs          5/29/2024    08:46 11/25/2024    09:18   Common Labs   Glucose 94  106    BUN 23  28    Creatinine 0.92  0.98    Sodium 144  141    Potassium 4.5  4.4    Chloride 105  103    Calcium 9.1  9.3    Total Protein 5.9  6.3    Albumin 4.0  4.2    Total  Bilirubin 0.6  0.8    Alkaline Phosphatase 60  69    AST (SGOT) 19  22    ALT (SGPT) 19  18    WBC 4.30  3.6    Hemoglobin 14.3  15.0    Hematocrit 43.7  46.4    Platelets 183  209    Total Cholesterol 202  205    Triglycerides 95  97    HDL Cholesterol 62  57    LDL Cholesterol  123  131    Hemoglobin A1C 5.80  6.1    PSA 0.437               Assessment and Plan Additional age appropriate preventative wellness advice topics were discussed during today's preventative wellness exam(some topics already addressed during AWV portion of the note above):    Physical Activity: Advised cardiovascular activity 150 minutes per week as tolerated. (example brisk walk for 30 minutes, 5 days a week).     Nutrition: Discussed nutrition plan with patient. Information shared in after visit summary. Goal is for a well balanced diet to enhance overall health.     Healthy Weight: Discussed current and goal BMI with patient. Steps to attain this goal discussed. Information shared in after visit summary.     Motor Vehicle Safety Discussion:  Wearing Seatbelt While in Motor Vehicle recommendation. Adhering to posted speed limit recommendation.     Injury Prevention Discussion:  Information shared in after visit summary.                 Medicare annual wellness visit, subsequent    Orders:    Comprehensive metabolic panel; Future    PSA DIAGNOSTIC ONLY; Future    Vitamin D 25 hydroxy; Future    TSH; Future    Lipid panel; Future    Hemoglobin A1c; Future    CBC w AUTO Differential; Future    Essential hypertension  Hypertension is stable and controlled  Continue current treatment regimen.  Blood pressure will be reassessed in 6 months.    Orders:    metoprolol succinate XL (Toprol XL) 25 MG 24 hr tablet; 1/2 tab p.o. daily    Comprehensive metabolic panel; Future    PSA DIAGNOSTIC ONLY; Future    Vitamin D 25 hydroxy; Future    TSH; Future    Lipid panel; Future    Hemoglobin A1c; Future    CBC w AUTO Differential; Future    Mixed  hyperlipidemia   Lipid abnormalities are worsening    Plan:  Lipid lowering therapy not prescribed due to patient refusal    Discussed medication dosage, use, side effects, and goals of treatment in detail.    Counseled patient on lifestyle modifications to help control hyperlipidemia.     Patient Treatment Goals:   LDL goal is less than 70  LDL goal is under 100    Followup in 6 months.    Orders:    Comprehensive metabolic panel; Future    PSA DIAGNOSTIC ONLY; Future    Vitamin D 25 hydroxy; Future    TSH; Future    Lipid panel; Future    Hemoglobin A1c; Future    CBC w AUTO Differential; Future    Atrial fibrillation with rapid ventricular response    Orders:    metoprolol succinate XL (Toprol XL) 25 MG 24 hr tablet; 1/2 tab p.o. daily    Comprehensive metabolic panel; Future    PSA DIAGNOSTIC ONLY; Future    Vitamin D 25 hydroxy; Future    TSH; Future    Lipid panel; Future    Hemoglobin A1c; Future    CBC w AUTO Differential; Future    Glucose intolerance (impaired glucose tolerance)    Orders:    Comprehensive metabolic panel; Future    PSA DIAGNOSTIC ONLY; Future    Vitamin D 25 hydroxy; Future    TSH; Future    Lipid panel; Future    Hemoglobin A1c; Future    CBC w AUTO Differential; Future    Vitamin D deficiency    Orders:    Comprehensive metabolic panel; Future    PSA DIAGNOSTIC ONLY; Future    Vitamin D 25 hydroxy; Future    TSH; Future    Lipid panel; Future    Hemoglobin A1c; Future    CBC w AUTO Differential; Future    Gastroesophageal reflux disease without esophagitis    Orders:    Comprehensive metabolic panel; Future    PSA DIAGNOSTIC ONLY; Future    Vitamin D 25 hydroxy; Future    TSH; Future    Lipid panel; Future    Hemoglobin A1c; Future    CBC w AUTO Differential; Future    Ulcer of esophagus without bleeding    Orders:    Comprehensive metabolic panel; Future    PSA DIAGNOSTIC ONLY; Future    Vitamin D 25 hydroxy; Future    TSH; Future    Lipid panel; Future    Hemoglobin A1c; Future    CBC  w AUTO Differential; Future    Benign nodular prostatic hyperplasia without lower urinary tract symptoms    Orders:    Comprehensive metabolic panel; Future    PSA DIAGNOSTIC ONLY; Future    Vitamin D 25 hydroxy; Future    TSH; Future    Lipid panel; Future    Hemoglobin A1c; Future    CBC w AUTO Differential; Future    Other male erectile dysfunction    Orders:    Comprehensive metabolic panel; Future    PSA DIAGNOSTIC ONLY; Future    Vitamin D 25 hydroxy; Future    TSH; Future    Lipid panel; Future    Hemoglobin A1c; Future    CBC w AUTO Differential; Future    Primary osteoarthritis involving multiple joints    Orders:    Comprehensive metabolic panel; Future    PSA DIAGNOSTIC ONLY; Future    Vitamin D 25 hydroxy; Future    TSH; Future    Lipid panel; Future    Hemoglobin A1c; Future    CBC w AUTO Differential; Future    Degeneration of intervertebral disc of lumbar region with discogenic back pain    Orders:    Comprehensive metabolic panel; Future    PSA DIAGNOSTIC ONLY; Future    Vitamin D 25 hydroxy; Future    TSH; Future    Lipid panel; Future    Hemoglobin A1c; Future    CBC w AUTO Differential; Future          I spent 30 minutes caring for Alfred on this date of service. This time includes time spent by me in the following activities:preparing for the visit, reviewing tests, obtaining and/or reviewing a separately obtained history, performing a medically appropriate examination and/or evaluation , counseling and educating the patient/family/caregiver, ordering medications, tests, or procedures, referring and communicating with other health care professionals , documenting information in the medical record, independently interpreting results and communicating that information with the patient/family/caregiver, and care coordination  Follow Up   Return in about 6 months (around 6/2/2025) for Recheck.  Patient was given instructions and counseling regarding his condition or for health maintenance advice.  Please see specific information pulled into the AVS if appropriate.

## 2024-12-02 NOTE — ASSESSMENT & PLAN NOTE
Orders:    metoprolol succinate XL (Toprol XL) 25 MG 24 hr tablet; 1/2 tab p.o. daily    Comprehensive metabolic panel; Future    PSA DIAGNOSTIC ONLY; Future    Vitamin D 25 hydroxy; Future    TSH; Future    Lipid panel; Future    Hemoglobin A1c; Future    CBC w AUTO Differential; Future

## 2024-12-17 ENCOUNTER — TELEPHONE (OUTPATIENT)
Dept: GASTROENTEROLOGY | Facility: CLINIC | Age: 69
End: 2024-12-17
Payer: MEDICARE

## 2024-12-17 NOTE — TELEPHONE ENCOUNTER
I have called the pt and passed on Dr Williamson's recommendations and the pt verbalized understanding

## 2024-12-17 NOTE — TELEPHONE ENCOUNTER
"Pt had an EGD in September and was started on pantoprazole, he is asking how long does he need to take this?  He said his \"stomach is fine\" and he is not experiencing any GERD symptoms at this time.    He is also asking if he can go back to taking his meloxicam, if he takes it in the am with breakfast?  "

## 2024-12-17 NOTE — TELEPHONE ENCOUNTER
Caller: Alfred Vazquez    Relationship: Self    Best call back number: [phone]     Requested Prescriptions:   Requested Prescriptions     Pending Prescriptions Disp Refills    flecainide (TAMBOCOR) 100 MG tablet       Sig: Take 1 tablet by mouth.        Pharmacy where request should be sent: [facility] [id number] [place] [id number] [facility] [phone] [facility] [phone]      Last office visit with prescribing clinician: 12/2/2024   Last telemedicine visit with prescribing clinician: Visit date not found   Next office visit with prescribing clinician: 6/5/2025     Additional details provided by patient:     Does the patient have less than a 3 day supply:  [] Yes  [x] No    Would you like a call back once the refill request has been completed: [] Yes [x] No    If the office needs to give you a call back, can they leave a voicemail: [] Yes [x] No    Keli Kingsley Rep   12/17/24 15:06 EST

## 2024-12-17 NOTE — TELEPHONE ENCOUNTER
Caller: Alfred Vazquez    Relationship to patient: Self    Best call back number: [phone]    Patient is needing: PATIENT HAS QUESTIONS IN REGARDS TO MEDICATIONS.  PLEASE REACH OUT.  THANK YOU

## 2024-12-18 RX ORDER — FLECAINIDE ACETATE 100 MG/1
100 TABLET ORAL 2 TIMES DAILY
Qty: 180 TABLET | Refills: 1 | Status: SHIPPED | OUTPATIENT
Start: 2024-12-18 | End: 2024-12-19 | Stop reason: SDUPTHER

## 2024-12-19 ENCOUNTER — OFFICE VISIT (OUTPATIENT)
Dept: CARDIOLOGY | Facility: CLINIC | Age: 69
End: 2024-12-19
Payer: MEDICARE

## 2024-12-19 VITALS
BODY MASS INDEX: 28.17 KG/M2 | WEIGHT: 208 LBS | OXYGEN SATURATION: 98 % | HEART RATE: 62 BPM | DIASTOLIC BLOOD PRESSURE: 80 MMHG | SYSTOLIC BLOOD PRESSURE: 134 MMHG | HEIGHT: 72 IN | RESPIRATION RATE: 18 BRPM

## 2024-12-19 DIAGNOSIS — I48.91 ATRIAL FIBRILLATION WITH RAPID VENTRICULAR RESPONSE: Primary | ICD-10-CM

## 2024-12-19 PROBLEM — I10 ESSENTIAL HYPERTENSION: Status: RESOLVED | Noted: 2022-05-26 | Resolved: 2024-12-19

## 2024-12-19 PROCEDURE — 93000 ELECTROCARDIOGRAM COMPLETE: CPT | Performed by: INTERNAL MEDICINE

## 2024-12-19 PROCEDURE — 1159F MED LIST DOCD IN RCRD: CPT | Performed by: INTERNAL MEDICINE

## 2024-12-19 PROCEDURE — 99204 OFFICE O/P NEW MOD 45 MIN: CPT | Performed by: INTERNAL MEDICINE

## 2024-12-19 PROCEDURE — 1160F RVW MEDS BY RX/DR IN RCRD: CPT | Performed by: INTERNAL MEDICINE

## 2024-12-19 RX ORDER — FLECAINIDE ACETATE 100 MG/1
100 TABLET ORAL 2 TIMES DAILY
Qty: 180 TABLET | Refills: 3 | Status: SHIPPED | OUTPATIENT
Start: 2024-12-19

## 2024-12-19 RX ORDER — MELOXICAM 15 MG/1
15 TABLET ORAL DAILY
COMMUNITY

## 2024-12-19 NOTE — PROGRESS NOTES
"      CARDIOLOGY    Samara Amor MD    ENCOUNTER DATE:  12/19/2024    Alfred Vazquez / 69 y.o. / male        CHIEF COMPLAINT / REASON FOR OFFICE VISIT     Heart Problem (New patient wanting to establish care )      HISTORY OF PRESENT ILLNESS       HPI    Alfred Vazquez is a 69 y.o. male     This gentleman wads previously followed by Dr. Kenyon, but Dr. Kenyon has retired so he is coming to see me instead.  He says that in about 2012 he palpitations that started out of nowhere, and would not go away. He went to the emergency room and was diagnosed with atrial fibrillation.  He said he was not cardioverted but sounds like he spontaneously cardioverted at some point, a few hours after it started.  He had a 2nd episode in approximately 2019.  It was the same thing where sudden palpitations came on, and he went to the emergency room and it went away with some medication, but he was not cardioverted.  In reviewing records from [place], he had an echo in December 2017, which showed ejection fraction 62%, normal diastolic function, no valvular heart disease or left atrial enlargement.  In September 2022 he wore a Holter monitor, which showed no atrial fibrillation.      He has had no symptoms to suggest a recurrence of atrial fibrillation.  He denies chest pain or shortness of breath.   He plays golf 3 days a week around the year without any change in symptoms.          VITAL SIGNS     Visit Vitals  /80   Pulse 62   Resp 18   Ht 182.9 cm (72\")   Wt 94.3 kg (208 lb)   SpO2 98%   BMI 28.21 kg/m²         Wt Readings from Last 3 Encounters:   12/19/24 94.3 kg (208 lb)   12/02/24 93.7 kg (206 lb 9.6 oz)   10/10/24 90.3 kg (199 lb)     Body mass index is 28.21 kg/m².      PHYSICAL EXAMINATION     Constitutional:       General: Not in acute distress.  Neck:      Vascular: No carotid bruit or JVD.   Pulmonary:      Effort: Pulmonary effort is normal.      Breath sounds: Normal breath sounds.   Cardiovascular:      Normal " rate. Regular rhythm.      Murmurs: There is no murmur.   Psychiatric:         Mood and Affect: Mood and affect normal.           REVIEWED DATA       ECG 12 Lead    Date/Time: 12/19/2024 1:21 PM  Performed by: Samara Amor MD    Authorized by: Samara Amor MD  Comparison: compared with previous ECG   Similar to previous ECG  Comparison to previous ECG: I cannot read the date on the previous EKG as it was too blurry.  Rhythm: sinus rhythm  BPM: 62  Conduction: incomplete right bundle branch block and left anterior fascicular block  ST Segments: ST segments normal  T Waves: T waves normal    Clinical impression: normal ECG            Lipid Panel          5/29/2024    08:46 11/25/2024    09:18   Lipid Panel   Total Cholesterol 202  205    Triglycerides 95  97    HDL Cholesterol 62  57    VLDL Cholesterol 17  17    LDL Cholesterol  123  131        Lab Results   Component Value Date    GLUCOSE 106 (H) 11/25/2024    BUN 28 (H) 11/25/2024    CREATININE 0.98 11/25/2024     11/25/2024    K 4.4 11/25/2024     11/25/2024    CALCIUM 9.3 11/25/2024    ALBUMIN 4.2 11/25/2024    ALT 18 11/25/2024    AST 22 11/25/2024    ALKPHOS 69 11/25/2024    BILITOT 0.8 11/25/2024    BCR 29 (H) 11/25/2024       ASSESSMENT & PLAN      Diagnosis Plan   1. Atrial fibrillation with rapid ventricular response              Paroxysmal atrial fibrillation.  He has had 2 events in the last 12 years.  Continue flecainide and metoprolol.  He is not on anticoagulation.  I reviewed Dr. Kenyon' last note which stated he was not on anticoagulation due to the infrequent nature of his events and lack of risk factors except for age.  Mr. Vazquez does understand that as he approaches age 73 or if he should develop diabetes, hypertension, TIA or vascular disease, we may need to start him on anticoagulation.  Borderline diabetes.  Not currently medicated.  Family history of abdominal aortic aneurysm.  I reviewed an abdominal ultrasound from 2019  which states his aorta is normal in size.  He had another abdominal ultrasound in 2021 they do not comment on his aortic size 1 where the other.    Follow-up with Shellie in a year unless his symptoms change sooner.    Orders Placed This Encounter   Procedures    ECG 12 Lead     This order was created via procedure documentation     Order Specific Question:   Release to patient     Answer:   Routine Release [[id number]]           MEDICATIONS         Discharge Medications            Accurate as of December 19, 2024  1:26 PM. If you have any questions, ask your nurse or doctor.                Continue These Medications        Instructions Start Date   aspirin 81 MG chewable tablet   1 tablet, Daily      CALCIUM CARBONATE-VITAMIN D PO   Take  by mouth.      famotidine 10 MG tablet  Commonly known as: PEPCID   20 mg, Daily      flecainide 100 MG tablet  Commonly known as: TAMBOCOR   100 mg, Oral, 2 Times Daily      meloxicam 15 MG tablet  Commonly known as: MOBIC   15 mg, Daily      metoprolol succinate XL 25 MG 24 hr tablet  Commonly known as: Toprol XL   1/2 tab p.o. daily      multivitamin tablet tablet   Daily      pantoprazole 40 MG EC tablet  Commonly known as: PROTONIX   40 mg, Oral, Daily      PREBIOTIC PRODUCT PO   Take  by mouth.      vitamin D3 125 MCG (5000 UT) capsule capsule   5,000 Units, Daily                 Samara Amor MD  12/19/24  13:26 EST    Part of this note may be an electronic transcription/translation of spoken language to printed text using the Dragon dictation system.

## 2025-02-19 ENCOUNTER — TELEPHONE (OUTPATIENT)
Dept: FAMILY MEDICINE CLINIC | Facility: CLINIC | Age: 70
End: 2025-02-19

## 2025-02-19 DIAGNOSIS — M15.0 PRIMARY OSTEOARTHRITIS INVOLVING MULTIPLE JOINTS: Primary | ICD-10-CM

## 2025-02-19 RX ORDER — MELOXICAM 15 MG/1
15 TABLET ORAL DAILY
Qty: 30 TABLET | Refills: 1 | Status: SHIPPED | OUTPATIENT
Start: 2025-02-19

## 2025-02-19 NOTE — TELEPHONE ENCOUNTER
Caller: Alfred Vazquez    Relationship: Self    Best call back number: [phone]    What medication are you requesting: MELOXICAM    What are your current symptoms:     How long have you been experiencing symptoms:     Have you had these symptoms before:    [] Yes  [] No    Have you been treated for these symptoms before:   [] Yes  [] No    If a prescription is needed, what is your preferred pharmacy and phone number: [facility] [address] [place] [address] PK[place] - [phone]  - [phone] FX     Additional notes: PATIENT IS CALLING IN TO REQUEST A REFILL ON MELOXICAM 15MG BUT THIS WAS NOT PRESCRIBED BY DR JOSÉ

## 2025-03-05 ENCOUNTER — OFFICE VISIT (OUTPATIENT)
Dept: FAMILY MEDICINE CLINIC | Facility: CLINIC | Age: 70
End: 2025-03-05
Payer: MEDICARE

## 2025-03-05 VITALS
BODY MASS INDEX: 28.97 KG/M2 | HEART RATE: 55 BPM | HEIGHT: 72 IN | WEIGHT: 213.9 LBS | TEMPERATURE: 97.5 F | OXYGEN SATURATION: 97 % | DIASTOLIC BLOOD PRESSURE: 84 MMHG | SYSTOLIC BLOOD PRESSURE: 128 MMHG

## 2025-03-05 DIAGNOSIS — M79.604 RIGHT LEG PAIN: Primary | ICD-10-CM

## 2025-03-05 DIAGNOSIS — I83.019: ICD-10-CM

## 2025-03-05 DIAGNOSIS — L97.919: ICD-10-CM

## 2025-03-05 DIAGNOSIS — M79.89 RIGHT LEG SWELLING: ICD-10-CM

## 2025-03-05 DIAGNOSIS — R22.41 MASS OF RIGHT THIGH: ICD-10-CM

## 2025-03-05 DIAGNOSIS — J06.9 VIRAL URI WITH COUGH: ICD-10-CM

## 2025-03-06 NOTE — PROGRESS NOTES
Subjective   Alfred Vazquez is a 69 y.o. adult with   Chief Complaint   Patient presents with    Hospital Follow Up Visit     Treated at [facility] for fainting on 3/2/2025, fainted while at the [facility] in [place], sent by ambulance to [facility]    Nasal Congestion    Right knee pain and swelling, hardness on side of knee   .    History of Present Illness   69-year-old white male who apparently developed upper respiratory congestion and cough was at a [facility] waiting to be seen when he had a syncopal episode.  He is transported via ambulance to emergency room at [facility].  This occurred on March 2 of this year.  Workup ensued including lab work and EKG-all of which was normal.  Patient seem to become lucid quite quickly and has not had another episode.  He continues to have issues with congestion as well as postnasal drip and nonproductive cough.  There has been no fever, loss of taste or smell.  Patient denies chest pain, shortness of air or audible wheezing.  Symptoms do seem to be improving slightly from onset.  He also complains of right knee pain with knowledge of varicosities in this area.  There also appears to be a questionable soft tissue mass in this area.  This has not interfered with his gait although it is tender to palpate.  His motor function is remain intact as well as sensation.  The following portions of the patient's history were reviewed and updated as appropriate: allergies, current medications, past family history, past medical history, past social history, past surgical history and problem list.    Review of Systems   Constitutional:  Negative for fatigue and fever.   HENT:  Positive for congestion and postnasal drip. Negative for sore throat.    Respiratory:  Positive for cough. Negative for shortness of breath and wheezing.    Cardiovascular:  Negative for chest pain.        Varicose veins   Musculoskeletal:  Positive for arthralgias.        Medial knee pain as well  as questionable tender soft tissue mass       Objective     Vitals:    03/05/25 1430   BP: 128/84   Pulse: 55   Temp: 97.5 °F (36.4 °C)   SpO2: 97%       No results found for this or any previous visit (from the past 4 weeks).    Physical Exam  Vitals and nursing note reviewed.   Constitutional:       Appearance: Normal appearance. He is well-developed and well-groomed.   HENT:      Head: Normocephalic and atraumatic.      Right Ear: Hearing, tympanic membrane, ear canal and external ear normal.      Left Ear: Hearing, tympanic membrane, ear canal and external ear normal.      Nose: Nose normal.      Mouth/Throat:      Lips: Pink.      Mouth: Mucous membranes are moist.      Pharynx: Oropharynx is clear. Uvula midline.   Neck:      Thyroid: No thyroid mass or thyromegaly.      Vascular: Normal carotid pulses. No carotid bruit.      Trachea: Trachea and phonation normal.   Cardiovascular:      Rate and Rhythm: Normal rate and regular rhythm.      Heart sounds: Normal heart sounds. No murmur heard.     No friction rub. No gallop.   Pulmonary:      Effort: Pulmonary effort is normal. No respiratory distress.      Breath sounds: Normal breath sounds. No decreased breath sounds, wheezing, rhonchi or rales.   Musculoskeletal:      Cervical back: Neck supple.      Comments: Right leg with distal thigh questionable soft tissue mass versus varicose vein with DVT.  This area is tender to palpation.  There are varicose veins proximal to this area as well as distally.  No evidence of calf tenderness or Homans' sign.  Motor 5/5 neurovascular intact distally.   Lymphadenopathy:      Cervical: No cervical adenopathy.   Skin:     General: Skin is warm and dry.      Findings: No rash.   Neurological:      Mental Status: He is alert and oriented to person, place, and time.   Psychiatric:         Attention and Perception: Attention and perception normal.         Mood and Affect: Mood and affect normal.         Speech: Speech normal.          Behavior: Behavior normal. Behavior is cooperative.         Thought Content: Thought content normal.         Cognition and Memory: Cognition and memory normal.         Judgment: Judgment normal.         Assessment & Plan   Diagnoses and all orders for this visit:    1. Right leg pain (Primary)  -     US Venous Doppler Lower Extremity Right (duplex); Future    2. Right leg swelling  -     US Venous Doppler Lower Extremity Right (duplex); Future    3. Ulcer of varicose vein of right leg  -     US Venous Doppler Lower Extremity Right (duplex); Future    4. Mass of right thigh    5. Viral URI with cough        Return if symptoms worsen or fail to improve.  BMI is >= 25 and <30. (Overweight) The following options were offered after discussion;: exercise counseling/recommendations and nutrition counseling/recommendations

## 2025-03-13 ENCOUNTER — HOSPITAL ENCOUNTER (OUTPATIENT)
Dept: ULTRASOUND IMAGING | Facility: HOSPITAL | Age: 70
Discharge: HOME | End: 2025-03-13
Admitting: FAMILY MEDICINE
Payer: MEDICARE

## 2025-03-13 DIAGNOSIS — I83.019: ICD-10-CM

## 2025-03-13 DIAGNOSIS — M79.89 RIGHT LEG SWELLING: ICD-10-CM

## 2025-03-13 DIAGNOSIS — L97.919: ICD-10-CM

## 2025-03-13 DIAGNOSIS — M79.604 RIGHT LEG PAIN: ICD-10-CM

## 2025-03-13 PROCEDURE — 93971 EXTREMITY STUDY: CPT

## 2025-03-17 ENCOUNTER — TELEPHONE (OUTPATIENT)
Dept: FAMILY MEDICINE CLINIC | Facility: CLINIC | Age: 70
End: 2025-03-17
Payer: MEDICARE

## 2025-03-17 NOTE — TELEPHONE ENCOUNTER
Patient called back after listening to the results of his venous doppler. He is concerned because he is still having pain and swelling. He wants to know what the next steps are. Please advise.

## 2025-03-18 DIAGNOSIS — G89.29 CHRONIC PAIN OF RIGHT KNEE: Primary | ICD-10-CM

## 2025-03-18 DIAGNOSIS — M25.461 PAIN AND SWELLING OF RIGHT KNEE: ICD-10-CM

## 2025-03-18 DIAGNOSIS — M25.561 CHRONIC PAIN OF RIGHT KNEE: Primary | ICD-10-CM

## 2025-03-18 DIAGNOSIS — M25.561 PAIN AND SWELLING OF RIGHT KNEE: ICD-10-CM

## 2025-03-18 NOTE — TELEPHONE ENCOUNTER
I would like to send him to an orthopedic doctor for their opinion.  Let me know if he is willing and I will start the process

## 2025-03-21 ENCOUNTER — PATIENT ROUNDING (BHMG ONLY) (OUTPATIENT)
Dept: ORTHOPEDIC SURGERY | Facility: CLINIC | Age: 70
End: 2025-03-21

## 2025-03-21 ENCOUNTER — OFFICE VISIT (OUTPATIENT)
Dept: ORTHOPEDIC SURGERY | Facility: CLINIC | Age: 70
End: 2025-03-21
Payer: MEDICARE

## 2025-03-21 VITALS
HEIGHT: 72 IN | WEIGHT: 210 LBS | DIASTOLIC BLOOD PRESSURE: 71 MMHG | HEART RATE: 63 BPM | SYSTOLIC BLOOD PRESSURE: 120 MMHG | BODY MASS INDEX: 28.44 KG/M2

## 2025-03-21 DIAGNOSIS — M25.561 ACUTE PAIN OF RIGHT KNEE: Primary | ICD-10-CM

## 2025-03-21 NOTE — PROGRESS NOTES
Subjective:     Patient ID: Alfred Vazquez is a 69 y.o. adult.    Chief Complaint:    History of Present Illness  Alfred Vazquez presents to clinic today for evaluation of right medial sided knee pain and swelling.  He states about a year ago he started to notice some increasing veins along the medial side of his knee and some worsening pain with palpation.  He denies any history of cancer other than skin cancer he denies any injury.  He has no pain with knee movement or activities.  He had a duplex ultrasound ordered by his primary care doctor which was negative for DVT and he was referred to orthopedics for further evaluation and management.     Social History     Occupational History    Not on file   Tobacco Use    Smoking status: Former     Current packs/day: 0.00     Average packs/day: 0.3 packs/day for 2.0 years (0.5 ttl pk-yrs)     Types: Cigarettes     Start date: 1968     Quit date: 1970     Years since quittin.2     Passive exposure: Past    Smokeless tobacco: Never    Tobacco comments:     Smoked as a teenager   Vaping Use    Vaping status: Never Used   Substance and Sexual Activity    Alcohol use: Not Currently    Drug use: No    Sexual activity: Yes     Partners: Female     Birth control/protection: Vasectomy      Past Medical History:   Diagnosis Date    Allergic rhinitis     Arthritis     Atrial fibrillation     Benign prostatic hypertrophy     DDD (degenerative disc disease), cervical     C-SPINE    ED (erectile dysfunction) of non-organic origin     Glucose intolerance (malabsorption)     Lateral epicondylitis of right elbow     Obesity     Patellar tendinitis of left knee     Precordial chest pain     Urethritis      Past Surgical History:   Procedure Laterality Date    TONSILLECTOMY AND ADENOIDECTOMY      TOOTH EXTRACTION         Family History   Problem Relation Age of Onset    Lung cancer Mother     Aneurysm Father         AORTIC    Lung cancer Father     Obesity Sister     Obesity  "Brother     Stroke Paternal Grandmother     Stroke Other     Diabetes Other     Suicide Attempts Other                  Objective:  Vitals:    25 1444   BP: 120/71   Pulse: 63   Weight: 95.3 kg (210 lb)   Height: 182.9 cm (72\")         25  1444   Weight: 95.3 kg (210 lb)     Body mass index is 28.48 kg/m².           Right Knee Exam     Tenderness   Right knee tenderness location: medial proximal tibia.    Range of Motion   Extension:  0 normal   Flexion:  120 normal     Tests   Varus: negative Valgus: negative  Lachman:  Anterior - negative    Posterior - negative  Drawer:  Anterior - negative    Posterior - negative    Other   Erythema: absent  Scars: absent  Sensation: normal  Pulse: present  Swelling: mild  Effusion: no effusion present    Comments:  There is a firm palpable mass along the medial proximal tibia in the soft tissues.               Imagin views of the right knee were ordered and reviewed by myself in the office today  Indication: right knee pain  Findings: X-rays demonstrate no acute osseous abnormality.  There is no signs of fracture dislocation or subluxation.  There is mild joint space narrowing, but no subchondral sclerosis, cystic changes, or periarticular osteophytes.  Some calcification of the popliteal artery.  Comparative studies: None      Assessment:        1. Acute pain of right knee           Plan:          Discussed treatment options at length with patient at today's visit.  I discussed with the patient that I believe this is more likely a varicose vein that has potentially clotted off.  I discussed with the patient that given his negative duplex however this is potentially unlikely.  I also discussed with him that I think the best course of action going forward would be to order an MRI to fully define the soft tissue mass that he has medially on his tibia.  I discussed with him that depending on what it shows we will determine where we go from there.  I will call the " patient with results of the MRI and to go over next steps which will likely either be referral to vascular surgery versus referral to musculoskeletal oncology.  He voiced understanding and agreement with the plan.  In the meantime he has no restrictions.  Follow up: will call patient with MRI results and next steps      Alfred Vazquez was in agreement with plan and had all questions answered.     Medications:  No orders of the defined types were placed in this encounter.      Followup:  No follow-ups on file.    Diagnoses and all orders for this visit:    1. Acute pain of right knee (Primary)  -     XR Knee 4+ View Right  -     MRI Knee Right Without Contrast; Future          Dictated utilizing Dragon dictation

## 2025-03-21 NOTE — PROGRESS NOTES
A My-Chart message has been sent to the patient for PATIENT ROUNDING with [facility].

## 2025-04-15 ENCOUNTER — HOSPITAL ENCOUNTER (OUTPATIENT)
Dept: MRI IMAGING | Facility: HOSPITAL | Age: 70
Discharge: HOME | End: 2025-04-15
Admitting: INTERNAL MEDICINE
Payer: MEDICARE

## 2025-04-15 DIAGNOSIS — M25.561 ACUTE PAIN OF RIGHT KNEE: ICD-10-CM

## 2025-04-15 PROCEDURE — 73721 MRI JNT OF LWR EXTRE W/O DYE: CPT

## 2025-04-22 ENCOUNTER — OFFICE VISIT (OUTPATIENT)
Dept: ORTHOPEDIC SURGERY | Facility: CLINIC | Age: 70
End: 2025-04-22
Payer: MEDICARE

## 2025-04-22 VITALS — BODY MASS INDEX: 28.44 KG/M2 | HEIGHT: 72 IN | WEIGHT: 210 LBS

## 2025-04-22 DIAGNOSIS — I83.91 VARICOSE VEINS OF RIGHT LOWER LEG: Primary | ICD-10-CM

## 2025-04-22 NOTE — PROGRESS NOTES
Subjective:     Patient ID: Alfred Vazquez is a 69 y.o. adult.    Chief Complaint:    History of Present Illness  Alfred Vazquez returns to clinic today for evaluation of a palpable mass in the medial aspect of his distal femur.  Patient was seen at last visit there were some concerns so an MRI was ordered.  The patient returns today to go over the results and next steps.  He denies any significant pain and is able to do other things that he enjoys.     Social History     Occupational History    Not on file   Tobacco Use    Smoking status: Former     Current packs/day: 0.00     Average packs/day: 0.3 packs/day for 2.0 years (0.5 ttl pk-yrs)     Types: Cigarettes     Start date: 1968     Quit date: 1970     Years since quittin.3     Passive exposure: Past    Smokeless tobacco: Never    Tobacco comments:     Smoked as a teenager   Vaping Use    Vaping status: Never Used   Substance and Sexual Activity    Alcohol use: Not Currently    Drug use: No    Sexual activity: Yes     Partners: Female     Birth control/protection: Vasectomy      Past Medical History:   Diagnosis Date    Allergic rhinitis     Arthritis     Atrial fibrillation     Benign prostatic hypertrophy     DDD (degenerative disc disease), cervical     C-SPINE    ED (erectile dysfunction) of non-organic origin     Glucose intolerance (malabsorption)     Lateral epicondylitis of right elbow     Obesity     Patellar tendinitis of left knee     Precordial chest pain     Urethritis      Past Surgical History:   Procedure Laterality Date    TONSILLECTOMY AND ADENOIDECTOMY      TOOTH EXTRACTION         Family History   Problem Relation Age of Onset    Lung cancer Mother     Aneurysm Father         AORTIC    Lung cancer Father     Obesity Sister     Obesity Brother     Stroke Paternal Grandmother     Stroke Other     Diabetes Other     Suicide Attempts Other                  Objective:  Vitals:    25 1414   Weight: 95.3 kg (210 lb)   Height: 182.9  "cm (72\")         04/22/25  1414   Weight: 95.3 kg (210 lb)     Body mass index is 28.48 kg/m².           Ortho Exam       Right Knee Exam      Tenderness   Right knee tenderness location: medial proximal tibia.     Range of Motion   Extension:  0 normal   Flexion:  120 normal      Tests   Varus: negative Valgus: negative  Lachman:  Anterior - negative    Posterior - negative  Drawer:  Anterior - negative    Posterior - negative     Other   Erythema: absent  Scars: absent  Sensation: normal  Pulse: present  Swelling: mild  Effusion: no effusion present     Comments:  There is a firm palpable mass along the medial proximal tibia in the soft tissues.    Imaging:   MRI Knee Right Without Contrast  Result Date: 4/16/2025  Impression: 1.Intact menisci and ligamentous structures. 2.There are small ganglion cyst formations about the cruciate ligaments which are otherwise intact. 3.Mild patellofemoral and medial compartment chondrosis. 4.There are prominent varicosities in the subcutaneous tissues, including along the medial aspect of the knee. This could correspond with patient's swelling and discoloration along the medial knee. Otherwise no suspicious mass. Electronically Signed: Quentin Tony MD  4/16/2025 8:17 AM EDT  Workstation ID: [id number]             Assessment:        1. Varicose veins of right lower leg           Plan:          Discussed treatment options at length with patient at today's visit.  I discussed with the patient that his MRI demonstrates varicosities to the anterior medial aspect of his knee.  I discussed with the patient that if these are asymptomatic I would not recommend any further treatment.  I discussed with him that ultimately if he were to seek treatment for this he would need to see a vascular surgeon or vein surgeon as this is not an orthopedic issue.  The patient states that they are doing just fine and that they do not really bother him that much.  He would like to continue with observation. "  I discussed with the patient that he does not need to schedule further follow-up with me today.  Follow up: As needed      Alfred Vazquez was in agreement with plan and had all questions answered.     Medications:  No orders of the defined types were placed in this encounter.      Followup:  No follow-ups on file.    Diagnoses and all orders for this visit:    1. Varicose veins of right lower leg (Primary)          Dictated utilizing Dragon dictation

## 2025-05-22 DIAGNOSIS — I10 ESSENTIAL HYPERTENSION: ICD-10-CM

## 2025-05-22 DIAGNOSIS — I48.91 ATRIAL FIBRILLATION WITH RAPID VENTRICULAR RESPONSE: ICD-10-CM

## 2025-05-22 RX ORDER — METOPROLOL SUCCINATE 25 MG/1
12.5 TABLET, EXTENDED RELEASE ORAL DAILY
Qty: 45 TABLET | Refills: 1 | Status: SHIPPED | OUTPATIENT
Start: 2025-05-22

## 2025-06-05 ENCOUNTER — OFFICE VISIT (OUTPATIENT)
Dept: FAMILY MEDICINE CLINIC | Facility: CLINIC | Age: 70
End: 2025-06-05
Payer: MEDICARE

## 2025-06-05 VITALS
SYSTOLIC BLOOD PRESSURE: 116 MMHG | OXYGEN SATURATION: 99 % | TEMPERATURE: 97.3 F | HEART RATE: 68 BPM | HEIGHT: 72 IN | BODY MASS INDEX: 28.3 KG/M2 | DIASTOLIC BLOOD PRESSURE: 70 MMHG | WEIGHT: 208.9 LBS

## 2025-06-05 DIAGNOSIS — R73.02 GLUCOSE INTOLERANCE (IMPAIRED GLUCOSE TOLERANCE): ICD-10-CM

## 2025-06-05 DIAGNOSIS — E78.2 MIXED HYPERLIPIDEMIA: Primary | ICD-10-CM

## 2025-06-05 DIAGNOSIS — I48.91 ATRIAL FIBRILLATION WITH RAPID VENTRICULAR RESPONSE: ICD-10-CM

## 2025-06-05 DIAGNOSIS — K21.9 GASTROESOPHAGEAL REFLUX DISEASE WITHOUT ESOPHAGITIS: ICD-10-CM

## 2025-06-05 DIAGNOSIS — M15.0 PRIMARY OSTEOARTHRITIS INVOLVING MULTIPLE JOINTS: ICD-10-CM

## 2025-06-05 DIAGNOSIS — E55.9 VITAMIN D DEFICIENCY: ICD-10-CM

## 2025-06-05 PROCEDURE — 1160F RVW MEDS BY RX/DR IN RCRD: CPT | Performed by: FAMILY MEDICINE

## 2025-06-05 PROCEDURE — 1159F MED LIST DOCD IN RCRD: CPT | Performed by: FAMILY MEDICINE

## 2025-06-05 PROCEDURE — 99213 OFFICE O/P EST LOW 20 MIN: CPT | Performed by: FAMILY MEDICINE

## 2025-06-05 PROCEDURE — 1126F AMNT PAIN NOTED NONE PRSNT: CPT | Performed by: FAMILY MEDICINE

## 2025-06-06 NOTE — PROGRESS NOTES
Subjective   Alfred Vazquez is a 69 y.o. adult with   Chief Complaint   Patient presents with    Hypertension     Labs prior    Hyperlipidemia    Vitamin D Deficiency    Osteoarthritis    Fatigue   .    Hypertension  Hyperlipidemia    Osteoarthritis  Associated symptoms include fatigue. His past medical history is significant for osteoarthritis.   Fatigue  Symptoms include fatigue.       69-year-old white male with multiple medical issues here for further medical management.  Patient with known history of hypertension, hyperlipidemia and vitamin D deficiency.  He also has history of osteoarthritis at multiple levels and his primary complaint is ongoing fatigue.  He is retired and plays golf several times a week.  Current medications include pantoprazole, famotidine as well as meloxicam.  Patient is also using Toprol-XL as well as flecainide.  He has history of atrial fibrillation and continues not to be anticoagulated.  All medications are used appropriately and well-tolerated without side effects.  Casting labs have been acquired prior to this visit.  The following portions of the patient's history were reviewed and updated as appropriate: allergies, current medications, past family history, past medical history, past social history, past surgical history and problem list.    Review of Systems   Constitutional:  Positive for fatigue.   Cardiovascular:         Hyperlipidemia, atrial fibrillation   Gastrointestinal:         GERD   Endocrine:        Vitamin D deficiency, glucose intolerance   Genitourinary:         Erectile dysfunction, BPH   Allergic/Immunologic: Positive for environmental allergies.       Objective     Vitals:    06/05/25 0844   BP: 116/70   Pulse: 68   Temp: 97.3 °F (36.3 °C)   SpO2: 99%       Recent Results (from the past 4 weeks)   Comprehensive metabolic panel    Collection Time: 05/30/25  8:30 AM    Specimen: Blood   Result Value Ref Range    Glucose 113 (H) 65 - 99 mg/dL    BUN 28.0 (H) 8.0 -  23.0 mg/dL    Creatinine 0.99 0.76 - 1.27 mg/dL    EGFR Result 81.4 >60.0 mL/min/1.73    BUN/Creatinine Ratio 28.3 (H) 7.0 - 25.0    Sodium 139 136 - 145 mmol/L    Potassium 4.5 3.5 - 5.2 mmol/L    Chloride 104 98 - 107 mmol/L    Total CO2 24.7 22.0 - 29.0 mmol/L    Calcium 9.2 8.6 - 10.5 mg/dL    Total Protein 6.2 6.0 - 8.5 g/dL    Albumin 4.1 3.5 - 5.2 g/dL    Globulin 2.1 gm/dL    A/G Ratio 2.0 g/dL    Total Bilirubin 0.7 0.0 - 1.2 mg/dL    Alkaline Phosphatase 64 39 - 117 U/L    AST (SGOT) 23 1 - 40 U/L    ALT (SGPT) 16 1 - 41 U/L   PSA DIAGNOSTIC ONLY    Collection Time: 05/30/25  8:30 AM    Specimen: Blood   Result Value Ref Range    PSA 0.347 0.000 - 4.000 ng/mL   Vitamin D 25 hydroxy    Collection Time: 05/30/25  8:30 AM    Specimen: Blood   Result Value Ref Range    25 Hydroxy, Vitamin D 108.0 (H) 30.0 - 100.0 ng/ml   TSH    Collection Time: 05/30/25  8:30 AM    Specimen: Blood   Result Value Ref Range    TSH 1.290 0.270 - 4.200 uIU/mL   Lipid panel    Collection Time: 05/30/25  8:30 AM    Specimen: Blood   Result Value Ref Range    Total Cholesterol 203 (H) 0 - 200 mg/dL    Triglycerides 111 0 - 150 mg/dL    HDL Cholesterol 57 40 - 60 mg/dL    VLDL Cholesterol Ornni 20 5 - 40 mg/dL    LDL Chol Calc (NIH) 126 (H) 0 - 100 mg/dL   Hemoglobin A1c    Collection Time: 05/30/25  8:30 AM    Specimen: Blood   Result Value Ref Range    Hemoglobin A1C 5.90 (H) 4.80 - 5.60 %   CBC w AUTO Differential    Collection Time: 05/30/25  8:30 AM    Specimen: Blood   Result Value Ref Range    WBC 3.87 3.40 - 10.80 10*3/mm3    RBC 4.72 4.14 - 5.80 10*6/mm3    Hemoglobin 15.7 13.0 - 17.7 g/dL    Hematocrit 45.5 37.5 - 51.0 %    MCV 96.4 79.0 - 97.0 fL    MCH 33.3 (H) 26.6 - 33.0 pg    MCHC 34.5 31.5 - 35.7 g/dL    RDW 11.9 (L) 12.3 - 15.4 %    Platelets 212 140 - 450 10*3/mm3    Neutrophil Rel % 52.1 42.7 - 76.0 %    Lymphocyte Rel % 32.8 19.6 - 45.3 %    Monocyte Rel % 13.2 (H) 5.0 - 12.0 %    Eosinophil Rel % 1.3 0.3 - 6.2 %     Basophil Rel % 0.3 0.0 - 1.5 %    Neutrophils Absolute 2.02 1.70 - 7.00 10*3/mm3    Lymphocytes Absolute 1.27 0.70 - 3.10 10*3/mm3    Monocytes Absolute 0.51 0.10 - 0.90 10*3/mm3    Eosinophils Absolute 0.05 0.00 - 0.40 10*3/mm3    Basophils Absolute 0.01 0.00 - 0.20 10*3/mm3    Immature Granulocyte Rel % 0.3 0.0 - 0.5 %    Immature Grans Absolute 0.01 0.00 - 0.05 10*3/mm3    nRBC 0.0 0.0 - 0.2 /100 WBC       Physical Exam  Vitals and nursing note reviewed.   Constitutional:       Appearance: Normal appearance. He is well-developed, well-groomed and overweight.   HENT:      Head: Normocephalic and atraumatic.   Neck:      Thyroid: No thyroid mass or thyromegaly.      Vascular: Normal carotid pulses. No carotid bruit.      Trachea: Trachea and phonation normal.   Cardiovascular:      Rate and Rhythm: Normal rate and regular rhythm.      Heart sounds: Normal heart sounds. No murmur heard.     No friction rub. No gallop.   Pulmonary:      Effort: Pulmonary effort is normal. No respiratory distress.      Breath sounds: Normal breath sounds. No decreased breath sounds, wheezing, rhonchi or rales.   Musculoskeletal:      Cervical back: Neck supple.   Lymphadenopathy:      Cervical: No cervical adenopathy.   Skin:     General: Skin is warm and dry.      Findings: No rash.   Neurological:      Mental Status: He is alert and oriented to person, place, and time.   Psychiatric:         Attention and Perception: Attention and perception normal.         Mood and Affect: Mood and affect normal.         Speech: Speech normal.         Behavior: Behavior normal. Behavior is cooperative.         Thought Content: Thought content normal.         Cognition and Memory: Cognition and memory normal.         Judgment: Judgment normal.         Assessment & Plan   Diagnoses and all orders for this visit:    1. Mixed hyperlipidemia (Primary)  -     Lipid panel; Future  -     Comprehensive metabolic panel; Future  -     TSH; Future  -      Vitamin D 25 hydroxy; Future  -     Hemoglobin A1c; Future  -     CBC w AUTO Differential; Future    2. Atrial fibrillation with rapid ventricular response  -     Lipid panel; Future  -     Comprehensive metabolic panel; Future  -     TSH; Future  -     Vitamin D 25 hydroxy; Future  -     Hemoglobin A1c; Future  -     CBC w AUTO Differential; Future    3. Glucose intolerance (impaired glucose tolerance)  -     Lipid panel; Future  -     Comprehensive metabolic panel; Future  -     TSH; Future  -     Vitamin D 25 hydroxy; Future  -     Hemoglobin A1c; Future  -     CBC w AUTO Differential; Future    4. Vitamin D deficiency  -     Lipid panel; Future  -     Comprehensive metabolic panel; Future  -     TSH; Future  -     Vitamin D 25 hydroxy; Future  -     Hemoglobin A1c; Future  -     CBC w AUTO Differential; Future    5. Gastroesophageal reflux disease without esophagitis  -     Lipid panel; Future  -     Comprehensive metabolic panel; Future  -     TSH; Future  -     Vitamin D 25 hydroxy; Future  -     Hemoglobin A1c; Future  -     CBC w AUTO Differential; Future    6. Primary osteoarthritis involving multiple joints  -     Lipid panel; Future  -     Comprehensive metabolic panel; Future  -     TSH; Future  -     Vitamin D 25 hydroxy; Future  -     Hemoglobin A1c; Future  -     CBC w AUTO Differential; Future      Patient must do better at lowering cholesterol in his diet with an LDL goal of 100 or less.  Other medications will continue without alteration.  Weight loss would be beneficial in regards to sugar status.  Anticipate repeat fasting labs in 5 to 6 months with follow-up with me thereafter.  Return in about 6 months (around 12/5/2025) for Recheck.